# Patient Record
Sex: FEMALE | Race: WHITE | NOT HISPANIC OR LATINO | Employment: OTHER | URBAN - METROPOLITAN AREA
[De-identification: names, ages, dates, MRNs, and addresses within clinical notes are randomized per-mention and may not be internally consistent; named-entity substitution may affect disease eponyms.]

---

## 2019-07-08 ENCOUNTER — TELEPHONE (OUTPATIENT)
Dept: FAMILY MEDICINE CLINIC | Facility: CLINIC | Age: 39
End: 2019-07-08

## 2019-07-08 ENCOUNTER — OFFICE VISIT (OUTPATIENT)
Dept: RETAIL CLINIC | Facility: CLINIC | Age: 39
End: 2019-07-08

## 2019-07-08 VITALS
RESPIRATION RATE: 16 BRPM | OXYGEN SATURATION: 98 % | DIASTOLIC BLOOD PRESSURE: 68 MMHG | SYSTOLIC BLOOD PRESSURE: 102 MMHG | HEART RATE: 81 BPM | TEMPERATURE: 97.8 F

## 2019-07-08 DIAGNOSIS — A08.4 VIRAL GASTROENTERITIS: Primary | ICD-10-CM

## 2019-07-08 PROCEDURE — 99203 OFFICE O/P NEW LOW 30 MIN: CPT | Performed by: NURSE PRACTITIONER

## 2019-07-08 RX ORDER — ASPIRIN 325 MG
325 TABLET ORAL DAILY
COMMUNITY
End: 2019-08-12

## 2019-07-08 RX ORDER — PROMETHAZINE HYDROCHLORIDE 12.5 MG/1
12.5 TABLET ORAL EVERY 6 HOURS PRN
Qty: 8 TABLET | Refills: 0 | Status: SHIPPED | OUTPATIENT
Start: 2019-07-08 | End: 2019-07-10

## 2019-07-08 RX ORDER — POTASSIUM CHLORIDE 1.5 G/1.77G
20 POWDER, FOR SOLUTION ORAL 2 TIMES DAILY
COMMUNITY
End: 2019-08-12

## 2019-07-08 RX ORDER — AMLODIPINE BESYLATE 2.5 MG/1
2.5 TABLET ORAL DAILY
COMMUNITY
End: 2019-08-12

## 2019-07-08 NOTE — TELEPHONE ENCOUNTER
No open ángel. tmrw morning to go to the [facility] clinic or urgent care to be treated patient will call back if needed

## 2019-07-08 NOTE — PROGRESS NOTES
Subjective   Ania Reyes is a 38 y.o. female.     Vomiting    This is a new problem. The current episode started yesterday. The problem occurs intermittently. The problem has been unchanged. The emesis has an appearance of bile. There has been no fever. Associated symptoms include myalgias. Pertinent negatives include no abdominal pain, chills, diarrhea or fever. She has tried nothing for the symptoms. The treatment provided no relief.        The following portions of the patient's history were reviewed and updated as appropriate: allergies, current medications, past family history, past medical history, past social history, past surgical history and problem list.    Review of Systems   Constitutional: Positive for appetite change and fatigue. Negative for chills and fever.   HENT: Negative.    Respiratory: Negative.    Cardiovascular: Negative.    Gastrointestinal: Positive for nausea and vomiting. Negative for abdominal pain and diarrhea.   Musculoskeletal: Positive for myalgias.       Objective   Physical Exam   Constitutional: She is oriented to person, place, and time. Vital signs are normal. She appears well-developed.   HENT:   Head: Normocephalic.   Cardiovascular: Normal rate and regular rhythm.   Pulmonary/Chest: Effort normal and breath sounds normal.   Abdominal: Soft. Normal appearance. Bowel sounds are decreased. There is no tenderness.   Neurological: She is alert and oriented to person, place, and time.   Skin: Skin is warm and dry.   Psychiatric: She has a normal mood and affect.     Vitals:    07/08/19 1237   BP: 102/68   Pulse: 81   Resp: 16   Temp: 97.8 °F (36.6 °C)   TempSrc: Oral   SpO2: 98%         Assessment/Plan   Ania was seen today for vomiting.    Diagnoses and all orders for this visit:    Viral gastroenteritis  -     promethazine (PHENERGAN) 12.5 MG tablet; Take 1 tablet by mouth Every 6 (Six) Hours As Needed for Nausea or Vomiting for up to 2 days.      Follow up with PCP if  symptoms worsen or fail to improve.  Discussed dehydrations signs and symptoms.  Encouraged fluid intake.    Viral Gastroenteritis, Adult  Viral gastroenteritis is also known as the stomach flu. This condition is caused by certain germs (viruses). These germs can be passed from person to person very easily (are very contagious). This condition can cause sudden watery poop (diarrhea), fever, and throwing up (vomiting).  Having watery poop and throwing up can make you feel weak and cause you to get dehydrated. Dehydration can make you tired and thirsty, make you have a dry mouth, and make it so you pee (urinate) less often. Older adults and people with other diseases or a weak defense system (immune system) are at higher risk for dehydration. It is important to replace the fluids that you lose from having watery poop and throwing up.  Follow these instructions at home:  Follow instructions from your doctor about how to care for yourself at home.  Eating and drinking  Follow these instructions as told by your doctor:  · Take an oral rehydration solution (ORS). This is a drink that is sold at pharmacies and stores.  · Drink clear fluids in small amounts as you are able, such as:  ? Water.  ? Ice chips.  ? Diluted fruit juice.  ? Low-calorie sports drinks.  · Eat bland, easy-to-digest foods in small amounts as you are able, such as:  ? Bananas.  ? Applesauce.  ? Rice.  ? Low-fat (lean) meats.  ? Toast.  ? Crackers.  · Avoid fluids that have a lot of sugar or caffeine in them.  · Avoid alcohol.  · Avoid spicy or fatty foods.    General instructions  · Drink enough fluid to keep your pee (urine) clear or pale yellow.  · Wash your hands often. If you cannot use soap and water, use hand .  · Make sure that all people in your home wash their hands well and often.  · Rest at home while you get better.  · Take over-the-counter and prescription medicines only as told by your doctor.  · Watch your condition for any  changes.  · Take a warm bath to help with any burning or pain from having watery poop.  · Keep all follow-up visits as told by your doctor. This is important.  Contact a doctor if:  · You cannot keep fluids down.  · Your symptoms get worse.  · You have new symptoms.  · You feel light-headed or dizzy.  · You have muscle cramps.  Get help right away if:  · You have chest pain.  · You feel very weak or you pass out (faint).  · You see blood in your throw-up.  · Your throw-up looks like coffee grounds.  · You have bloody or black poop (stools) or poop that look like tar.  · You have a very bad headache, a stiff neck, or both.  · You have a rash.  · You have very bad pain, cramping, or bloating in your belly (abdomen).  · You have trouble breathing.  · You are breathing very quickly.  · Your heart is beating very quickly.  · Your skin feels cold and clammy.  · You feel confused.  · You have pain when you pee.  · You have signs of dehydration, such as:  ? Dark pee, hardly any pee, or no pee.  ? Cracked lips.  ? Dry mouth.  ? Sunken eyes.  ? Sleepiness.  ? Weakness.  This information is not intended to replace advice given to you by your health care provider. Make sure you discuss any questions you have with your health care provider.  Document Released: 06/05/2009 Document Revised: 02/16/2018 Document Reviewed: 08/23/2016  [employer] Interactive Patient Education © 2019 [employer]

## 2019-07-08 NOTE — PATIENT INSTRUCTIONS
Viral Gastroenteritis, Adult  Viral gastroenteritis is also known as the stomach flu. This condition is caused by certain germs (viruses). These germs can be passed from person to person very easily (are very contagious). This condition can cause sudden watery poop (diarrhea), fever, and throwing up (vomiting).  Having watery poop and throwing up can make you feel weak and cause you to get dehydrated. Dehydration can make you tired and thirsty, make you have a dry mouth, and make it so you pee (urinate) less often. Older adults and people with other diseases or a weak defense system (immune system) are at higher risk for dehydration. It is important to replace the fluids that you lose from having watery poop and throwing up.  Follow these instructions at home:  Follow instructions from your doctor about how to care for yourself at home.  Eating and drinking  Follow these instructions as told by your doctor:  · Take an oral rehydration solution (ORS). This is a drink that is sold at pharmacies and stores.  · Drink clear fluids in small amounts as you are able, such as:  ? Water.  ? Ice chips.  ? Diluted fruit juice.  ? Low-calorie sports drinks.  · Eat bland, easy-to-digest foods in small amounts as you are able, such as:  ? Bananas.  ? Applesauce.  ? Rice.  ? Low-fat (lean) meats.  ? Toast.  ? Crackers.  · Avoid fluids that have a lot of sugar or caffeine in them.  · Avoid alcohol.  · Avoid spicy or fatty foods.    General instructions  · Drink enough fluid to keep your pee (urine) clear or pale yellow.  · Wash your hands often. If you cannot use soap and water, use hand .  · Make sure that all people in your home wash their hands well and often.  · Rest at home while you get better.  · Take over-the-counter and prescription medicines only as told by your doctor.  · Watch your condition for any changes.  · Take a warm bath to help with any burning or pain from having watery poop.  · Keep all follow-up  visits as told by your doctor. This is important.  Contact a doctor if:  · You cannot keep fluids down.  · Your symptoms get worse.  · You have new symptoms.  · You feel light-headed or dizzy.  · You have muscle cramps.  Get help right away if:  · You have chest pain.  · You feel very weak or you pass out (faint).  · You see blood in your throw-up.  · Your throw-up looks like coffee grounds.  · You have bloody or black poop (stools) or poop that look like tar.  · You have a very bad headache, a stiff neck, or both.  · You have a rash.  · You have very bad pain, cramping, or bloating in your belly (abdomen).  · You have trouble breathing.  · You are breathing very quickly.  · Your heart is beating very quickly.  · Your skin feels cold and clammy.  · You feel confused.  · You have pain when you pee.  · You have signs of dehydration, such as:  ? Dark pee, hardly any pee, or no pee.  ? Cracked lips.  ? Dry mouth.  ? Sunken eyes.  ? Sleepiness.  ? Weakness.  This information is not intended to replace advice given to you by your health care provider. Make sure you discuss any questions you have with your health care provider.  Document Released: 06/05/2009 Document Revised: 02/16/2018 Document Reviewed: 08/23/2016  [employer] Interactive Patient Education © 2019 [employer]

## 2019-08-12 ENCOUNTER — OFFICE VISIT (OUTPATIENT)
Dept: FAMILY MEDICINE CLINIC | Facility: CLINIC | Age: 39
End: 2019-08-12

## 2019-08-12 VITALS
HEART RATE: 70 BPM | BODY MASS INDEX: 22.07 KG/M2 | WEIGHT: 149 LBS | DIASTOLIC BLOOD PRESSURE: 62 MMHG | OXYGEN SATURATION: 99 % | HEIGHT: 69 IN | TEMPERATURE: 98.3 F | SYSTOLIC BLOOD PRESSURE: 90 MMHG

## 2019-08-12 DIAGNOSIS — M79.641 PAIN IN RIGHT HAND: ICD-10-CM

## 2019-08-12 DIAGNOSIS — E78.5 DYSLIPIDEMIA: Primary | ICD-10-CM

## 2019-08-12 DIAGNOSIS — M62.89 MUSCLE HYPERTONICITY: ICD-10-CM

## 2019-08-12 DIAGNOSIS — G81.10 SPASTIC HEMIPLEGIA AFFECTING DOMINANT SIDE (HCC): ICD-10-CM

## 2019-08-12 DIAGNOSIS — R42 DIZZINESS: ICD-10-CM

## 2019-08-12 PROBLEM — Z29.89 NEED FOR SBE (SUBACUTE BACTERIAL ENDOCARDITIS) PROPHYLAXIS: Status: ACTIVE | Noted: 2019-08-12

## 2019-08-12 PROBLEM — G81.90 HEMIPLEGIA (HCC): Status: ACTIVE | Noted: 2018-09-28

## 2019-08-12 PROBLEM — Z29.8 NEED FOR SBE (SUBACUTE BACTERIAL ENDOCARDITIS) PROPHYLAXIS: Status: ACTIVE | Noted: 2019-08-12

## 2019-08-12 PROBLEM — N20.0 NEPHROLITHIASIS: Status: ACTIVE | Noted: 2019-08-12

## 2019-08-12 PROBLEM — N20.0 NEPHROLITHIASIS: Status: RESOLVED | Noted: 2019-08-12 | Resolved: 2019-08-12

## 2019-08-12 PROBLEM — M85.80 OSTEOPENIA: Status: ACTIVE | Noted: 2019-08-12

## 2019-08-12 PROCEDURE — 99214 OFFICE O/P EST MOD 30 MIN: CPT | Performed by: NURSE PRACTITIONER

## 2019-08-12 RX ORDER — METOPROLOL SUCCINATE 25 MG/1
25 TABLET, EXTENDED RELEASE ORAL DAILY
Refills: 3 | COMMUNITY
Start: 2019-07-01

## 2019-08-12 RX ORDER — POTASSIUM CHLORIDE 750 MG/1
CAPSULE, EXTENDED RELEASE ORAL
COMMUNITY
End: 2019-08-12

## 2019-08-12 RX ORDER — DICLOFENAC SODIUM 75 MG/1
75 TABLET, DELAYED RELEASE ORAL 2 TIMES DAILY
COMMUNITY
End: 2019-08-12

## 2019-08-12 RX ORDER — BACLOFEN 10 MG/1
10 TABLET ORAL DAILY
COMMUNITY
End: 2019-08-12 | Stop reason: SDUPTHER

## 2019-08-12 RX ORDER — GABAPENTIN 600 MG/1
600 TABLET ORAL 4 TIMES DAILY
Refills: 5 | COMMUNITY
Start: 2019-08-02

## 2019-08-12 RX ORDER — ATORVASTATIN CALCIUM 20 MG/1
20 TABLET, FILM COATED ORAL DAILY
COMMUNITY

## 2019-08-12 RX ORDER — BACLOFEN 10 MG/1
10 TABLET ORAL DAILY
Qty: 90 TABLET | Refills: 1 | Status: SHIPPED | OUTPATIENT
Start: 2019-08-12 | End: 2020-02-18 | Stop reason: SDUPTHER

## 2019-08-12 RX ORDER — POTASSIUM CHLORIDE 750 MG/1
10 TABLET, FILM COATED, EXTENDED RELEASE ORAL DAILY
COMMUNITY

## 2019-08-12 NOTE — PROGRESS NOTES
Subjective   Ania Reyes is a 39 y.o. female.     Chief Complaint   Patient presents with   • Dizziness     when walking    • Follow-up     right hand pain   • Hyperlipidemia       History of Present Illness   Patient presents with c/o dizziness when bending over and then raising up; some orthostasis last week when pulling weeds and had been bent over; no longer taking Bumex daily; Dr. Mcucrdy, cardiology decreased dose in March and helped dizziness; stopped Bumex about 3 months ago; drinks plenty of liquids; no runny/stuffy nose; no ear pain; no sore throat; had stomach virus about 10 days ago.    F/U MVR: takes Metoprolol daily; does not monitor BP; no swelling; no headaches.    F/U spastic hemiplegia: takes Diclofenac twice daily and Baclofen daily as needed; sees pain management; also takes Gabapentin TID; has discomfort in right hand near thumb; no current PT; has not been doing home exercises.    F/U Hyperlipidemia: takes Atorvastatin daily; no myalgias; does not really watch diet; not much exercise; not fasting today.    The following portions of the patient's history were reviewed and updated as appropriate: allergies, current medications, past family history, past medical history, past social history, past surgical history and problem list.    Past Medical History:   Diagnosis Date   • Allergic rhinitis    • Aphasia    • BMI 22.0-22.9, adult    • Depression with anxiety    • Dyslipidemia    • Fatigue    • Functional murmur    • Gum hypertrophy    • High risk medication use    • History of acute sinusitis    • History of dizziness    • History of echocardiogram    • History of hyperparathyroidism    • History of kidney stones     Bilateral   • History of low back pain    • History of mitral valve repair    • History of nephrolithiasis    • History of stroke    • History of transesophageal echocardiography (PRASHANT)    • Lactase deficiency    • Mitral regurgitation    • Mitral valve prolapse    • Muscle  hypertonicity    • Osteopenia    • Pain in right hand    • Pain, wrist joint    • Parathyroid adenoma    • Positive depression screening    • Primary hyperparathyroidism (CMS/HCC)    • Right arm pain    • Sinus tachycardia    • Spastic hemiplegia affecting dominant side (CMS/HCC)    • Vitamin D deficiency        Past Surgical History:   Procedure Laterality Date   • KNEE SURGERY Left    • MITRAL VALVE REPAIR/REPLACEMENT     • OTHER SURGICAL HISTORY      Selective Arterial Catheterization    • PARATHYROID GLAND SURGERY  2014    Parathyroid resection       Family History   Problem Relation Age of Onset   • Coronary artery disease Mother    • Glaucoma Mother    • Nephrolithiasis Mother    • No Known Problems Father    • Colon cancer Maternal Grandmother         diagnosed in her 60's       Social History     Socioeconomic History   • Marital status:      Spouse name: Not on file   • Number of children: Not on file   • Years of education: Not on file   • Highest education level: Not on file   Tobacco Use   • Smoking status: Never Smoker   • Smokeless tobacco: Never Used   Substance and Sexual Activity   • Alcohol use: Yes     Comment: social drinker   • Drug use: No   • Sexual activity: Yes     Partners: Male     Birth control/protection: None       Review of Systems   Constitutional: Negative for appetite change, chills, fatigue, fever, unexpected weight gain and unexpected weight loss.   HENT: Negative for ear pain, rhinorrhea, sore throat and trouble swallowing.    Eyes: Negative for blurred vision and pain.   Respiratory: Negative for cough, chest tightness and shortness of breath.    Cardiovascular: Negative for chest pain, palpitations and leg swelling.   Gastrointestinal: Negative for abdominal pain, constipation, diarrhea, GERD and indigestion.   Genitourinary: Negative for dysuria and frequency.   Musculoskeletal: Negative for arthralgias and back pain.   Skin: Negative for rash.   Neurological: Positive  "for dizziness. Negative for syncope and headache.   Psychiatric/Behavioral: Negative for sleep disturbance and depressed mood.       Objective   Vitals:    08/12/19 1314 08/12/19 1405 08/12/19 1406   BP: 99/60 90/70 90/62   BP Location: Right arm Left arm Left arm   Patient Position: Sitting Sitting Standing   Cuff Size: Adult     Pulse: 70     Temp: 98.3 °F (36.8 °C)     SpO2: 99%     Weight: 67.6 kg (149 lb)     Height: 175.3 cm (69\")       Body mass index is 22 kg/m².    Physical Exam   Constitutional: She is oriented to person, place, and time. She appears well-developed and well-nourished. No distress.   HENT:   Head: Normocephalic.   Right Ear: Tympanic membrane is not erythematous. No middle ear effusion.   Left Ear: Tympanic membrane is not erythematous. Left ear middle ear effusion: mild fluid behind left TM.   Nose: Septal deviation (deviated to right) present. Abnormal turbinates: pale. Right sinus exhibits no maxillary sinus tenderness and no frontal sinus tenderness. Left sinus exhibits no maxillary sinus tenderness and no frontal sinus tenderness.   Mouth/Throat: Oropharynx is clear and moist and mucous membranes are normal. No oropharyngeal exudate or posterior oropharyngeal erythema.   Eyes: Conjunctivae are normal.   Neck: Normal range of motion. Neck supple. Carotid bruit is not present.   Cardiovascular: Normal rate, regular rhythm, normal heart sounds and intact distal pulses.   No murmur heard.  Pulmonary/Chest: Effort normal and breath sounds normal. No respiratory distress.   Abdominal: Soft. Bowel sounds are normal. There is no hepatosplenomegaly. There is no tenderness.   Musculoskeletal: She exhibits no edema.        Right wrist: Decreased range of motion: slight decreased ROM of right hand compared to left; strength slightly weaker on right compared to left.   Neurological: She is alert and oriented to person, place, and time.   Skin: Skin is warm and dry.   Psychiatric: She has a normal " mood and affect. Judgment normal.   Vitals reviewed.        Assessment/Plan .  Problem List Items Addressed This Visit     Spastic hemiplegia affecting dominant side (CMS/HCC)    Current Assessment & Plan     Resume home therapy exercises for right hand.         Relevant Medications    baclofen (LIORESAL) 10 MG tablet    diclofenac (VOLTAREN) 50 MG EC tablet    Pain in right hand    Current Assessment & Plan     Will try lower dose of Diclofenac twice daily.         Relevant Medications    baclofen (LIORESAL) 10 MG tablet    diclofenac (VOLTAREN) 50 MG EC tablet    Muscle hypertonicity    Relevant Medications    baclofen (LIORESAL) 10 MG tablet    diclofenac (VOLTAREN) 50 MG EC tablet    Dyslipidemia - Primary    Current Assessment & Plan     Continue to work on healthy diet and exercise.         Relevant Orders    Comprehensive Metabolic Panel    Lipid Panel With LDL / HDL Ratio      Other Visit Diagnoses     Dizziness        Change positions slowly.    Relevant Orders    CBC & Differential          Return in about 6 months (around 2/12/2020), or if symptoms worsen or fail to improve, for Medicare Wellness.

## 2019-08-12 NOTE — PATIENT INSTRUCTIONS
Continue increased intake of clear liquids.  Change positions slowly.  Try over the counter antihistamine, such as Claritin or Allegra.  Monitor your blood pressure periodically and record results.  Follow up pending lab results.  Check with cardiology regarding BP readings.

## 2019-08-13 LAB
ALBUMIN SERPL-MCNC: 4.2 G/DL (ref 3.5–5.5)
ALBUMIN/GLOB SERPL: 1.8 {RATIO} (ref 1.2–2.2)
ALP SERPL-CCNC: 80 IU/L (ref 39–117)
ALT SERPL-CCNC: 14 IU/L (ref 0–32)
AST SERPL-CCNC: 13 IU/L (ref 0–40)
BASOPHILS # BLD AUTO: 0.1 X10E3/UL (ref 0–0.2)
BASOPHILS NFR BLD AUTO: 1 %
BILIRUB SERPL-MCNC: 0.3 MG/DL (ref 0–1.2)
BUN SERPL-MCNC: 16 MG/DL (ref 6–20)
BUN/CREAT SERPL: 25 (ref 9–23)
CALCIUM SERPL-MCNC: 9.4 MG/DL (ref 8.7–10.2)
CHLORIDE SERPL-SCNC: 105 MMOL/L (ref 96–106)
CHOLEST SERPL-MCNC: 140 MG/DL (ref 100–199)
CO2 SERPL-SCNC: 25 MMOL/L (ref 20–29)
CREAT SERPL-MCNC: 0.65 MG/DL (ref 0.57–1)
EOSINOPHIL # BLD AUTO: 0.1 X10E3/UL (ref 0–0.4)
EOSINOPHIL NFR BLD AUTO: 1 %
ERYTHROCYTE [DISTWIDTH] IN BLOOD BY AUTOMATED COUNT: 13.3 % (ref 12.3–15.4)
GLOBULIN SER CALC-MCNC: 2.4 G/DL (ref 1.5–4.5)
GLUCOSE SERPL-MCNC: 77 MG/DL (ref 65–99)
HCT VFR BLD AUTO: 36.1 % (ref 34–46.6)
HDLC SERPL-MCNC: 43 MG/DL
HGB BLD-MCNC: 11.8 G/DL (ref 11.1–15.9)
IMM GRANULOCYTES # BLD AUTO: 0 X10E3/UL (ref 0–0.1)
IMM GRANULOCYTES NFR BLD AUTO: 0 %
LDLC SERPL CALC-MCNC: 64 MG/DL (ref 0–99)
LDLC/HDLC SERPL: 1.5 RATIO (ref 0–3.2)
LYMPHOCYTES # BLD AUTO: 1.9 X10E3/UL (ref 0.7–3.1)
LYMPHOCYTES NFR BLD AUTO: 29 %
MCH RBC QN AUTO: 29.3 PG (ref 26.6–33)
MCHC RBC AUTO-ENTMCNC: 32.7 G/DL (ref 31.5–35.7)
MCV RBC AUTO: 90 FL (ref 79–97)
MONOCYTES # BLD AUTO: 0.4 X10E3/UL (ref 0.1–0.9)
MONOCYTES NFR BLD AUTO: 7 %
NEUTROPHILS # BLD AUTO: 3.9 X10E3/UL (ref 1.4–7)
NEUTROPHILS NFR BLD AUTO: 62 %
PLATELET # BLD AUTO: 244 X10E3/UL (ref 150–450)
POTASSIUM SERPL-SCNC: 4.1 MMOL/L (ref 3.5–5.2)
PROT SERPL-MCNC: 6.6 G/DL (ref 6–8.5)
RBC # BLD AUTO: 4.03 X10E6/UL (ref 3.77–5.28)
SODIUM SERPL-SCNC: 144 MMOL/L (ref 134–144)
TRIGL SERPL-MCNC: 167 MG/DL (ref 0–149)
VLDLC SERPL CALC-MCNC: 33 MG/DL (ref 5–40)
WBC # BLD AUTO: 6.4 X10E3/UL (ref 3.4–10.8)

## 2019-10-07 PROBLEM — IMO0002 LACTASE DEFICIENCY: Status: RESOLVED | Noted: 2019-10-07 | Resolved: 2019-10-07

## 2019-10-07 PROBLEM — R47.01 APHASIA: Status: ACTIVE | Noted: 2019-10-07

## 2019-10-07 PROBLEM — R47.01 APHASIA: Status: RESOLVED | Noted: 2019-10-07 | Resolved: 2019-10-07

## 2019-10-07 PROBLEM — E55.9 VITAMIN D DEFICIENCY: Status: ACTIVE | Noted: 2019-10-07

## 2019-10-07 PROBLEM — IMO0002 LACTASE DEFICIENCY: Status: ACTIVE | Noted: 2019-10-07

## 2020-01-28 ENCOUNTER — OFFICE VISIT (OUTPATIENT)
Dept: FAMILY MEDICINE CLINIC | Facility: CLINIC | Age: 40
End: 2020-01-28

## 2020-01-28 VITALS
TEMPERATURE: 98.2 F | OXYGEN SATURATION: 98 % | HEIGHT: 69 IN | WEIGHT: 148.8 LBS | BODY MASS INDEX: 22.04 KG/M2 | HEART RATE: 66 BPM | SYSTOLIC BLOOD PRESSURE: 110 MMHG | DIASTOLIC BLOOD PRESSURE: 76 MMHG

## 2020-01-28 DIAGNOSIS — M79.641 PAIN IN RIGHT HAND: ICD-10-CM

## 2020-01-28 DIAGNOSIS — G81.10 SPASTIC HEMIPLEGIA AFFECTING DOMINANT SIDE (HCC): ICD-10-CM

## 2020-01-28 DIAGNOSIS — Z00.00 ENCOUNTER FOR MEDICARE ANNUAL WELLNESS EXAM: Primary | ICD-10-CM

## 2020-01-28 DIAGNOSIS — M85.80 OSTEOPENIA, UNSPECIFIED LOCATION: ICD-10-CM

## 2020-01-28 DIAGNOSIS — E78.5 DYSLIPIDEMIA: ICD-10-CM

## 2020-01-28 DIAGNOSIS — I69.351 HEMIPLEGIA OF RIGHT DOMINANT SIDE AS LATE EFFECT OF CEREBRAL INFARCTION, UNSPECIFIED HEMIPLEGIA TYPE (HCC): ICD-10-CM

## 2020-01-28 DIAGNOSIS — M62.89 MUSCLE HYPERTONICITY: ICD-10-CM

## 2020-01-28 DIAGNOSIS — J30.9 ALLERGIC RHINITIS, UNSPECIFIED SEASONALITY, UNSPECIFIED TRIGGER: ICD-10-CM

## 2020-01-28 DIAGNOSIS — E55.9 VITAMIN D DEFICIENCY: ICD-10-CM

## 2020-01-28 PROCEDURE — G0439 PPPS, SUBSEQ VISIT: HCPCS | Performed by: NURSE PRACTITIONER

## 2020-01-28 PROCEDURE — 99213 OFFICE O/P EST LOW 20 MIN: CPT | Performed by: NURSE PRACTITIONER

## 2020-01-28 NOTE — PATIENT INSTRUCTIONS
Monitor your blood pressure periodically and record results.  Continue to work on healthy diet and exercise.  Check with cardiology regarding Metoprolol dose.  Try to decrease Diclofenac to once daily.  Try over the counter antihistamine, such as Claritin or Allegra, daily.  Follow up pending lab results.  Follow up in 6 months, or sooner if symptoms persist or worsen.      Medicare Wellness  Personal Prevention Plan of Service     Date of Office Visit:  2020  Encounter Provider:  YONG Garcia  Place of Service:  [facility]  Patient Name: Ania Reyes  :  1980    As part of the Medicare Wellness portion of your visit today, we are providing you with this personalized preventive plan of services (PPPS). This plan is based upon recommendations of the [place] Preventive Services Task Force (USPSTF) and the Advisory Committee on Immunization Practices (ACIP).    This lists the preventive care services that should be considered, and provides dates of when you are due. Items listed as completed are up-to-date and do not require any further intervention.    Health Maintenance   Topic Date Due   • TDAP/TD VACCINES (1 - Tdap) 1993   • PAP SMEAR  2019   • MEDICARE ANNUAL WELLNESS  2020   • DXA SCAN  2021   • INFLUENZA VACCINE  Completed       Orders Placed This Encounter   Procedures   • Comprehensive Metabolic Panel   • Lipid Panel With LDL / HDL Ratio   • Vitamin D 25 Hydroxy       Return in about 6 months (around 2020) for Recheck.

## 2020-01-28 NOTE — PROGRESS NOTES
The ABCs of the Annual Wellness Visit  Subsequent Medicare Wellness Visit    Chief Complaint   Patient presents with   • Annual Exam     [facility]       Subjective   History of Present Illness:  Ania Reyes is a 39 y.o. female who presents for a Subsequent Medicare Wellness Visit.    HEALTH RISK ASSESSMENT    Recent Hospitalizations:  No hospitalization(s) within the last year.    Current Medical Providers:  Patient Care Team:  Cinthia Mark APRN as PCP - General (Family Medicine)  Betty Mccurdy MD as Consulting Physician (Cardiology)  Amanda Escobar (Nurse Practitioner)    Smoking Status:  Social History     Tobacco Use   Smoking Status Never Smoker   Smokeless Tobacco Never Used       Alcohol Consumption:  Social History     Substance and Sexual Activity   Alcohol Use Yes    Comment: social drinker       Depression Screen:   PHQ-2/PHQ-9 Depression Screening 1/28/2020   Little interest or pleasure in doing things 0   Feeling down, depressed, or hopeless 0   Total Score 0       Fall Risk Screen:  LAURYN Fall Risk Assessment was completed, and patient is at LOW risk for falls.Assessment completed on:1/28/2020    Health Habits and Functional and Cognitive Screening:  Functional & Cognitive Status 1/28/2020   Do you have difficulty preparing food and eating? No   Do you have difficulty bathing yourself, getting dressed or grooming yourself? No   Do you have difficulty using the toilet? No   Do you have difficulty moving around from place to place? No   Do you have trouble with steps or getting out of a bed or a chair? No   Current Diet Well Balanced Diet   Dental Exam Up to date   Eye Exam Up to date   Exercise (times per week) 0 times per week   Current Exercise Activities Include None   Do you need help using the phone?  No   Are you deaf or do you have serious difficulty hearing?  No   Do you need help with transportation? No   Do you need help shopping? No   Do you need help preparing meals?  No   Do you need  help with housework?  No   Do you need help with laundry? No   Do you need help taking your medications? No   Do you need help managing money? No   Do you ever drive or ride in a car without wearing a seat belt? No   Have you felt unusual stress, anger or loneliness in the last month? No   Who do you live with? Spouse   If you need help, do you have trouble finding someone available to you? No   Have you been bothered in the last four weeks by sexual problems? No   Do you have difficulty concentrating, remembering or making decisions? No         Does the patient have evidence of cognitive impairment? No    Asprin use counseling:Taking ASA appropriately as indicated    Age-appropriate Screening Schedule:  Refer to the list below for future screening recommendations based on patient's age, sex and/or medical conditions. Orders for these recommended tests are listed in the plan section. The patient has been provided with a written plan.    Health Maintenance   Topic Date Due   • TDAP/TD VACCINES (1 - Tdap) 08/01/1993   • PAP SMEAR  07/28/2020 (Originally 7/8/2019)   • DXA SCAN  02/21/2021   • INFLUENZA VACCINE  Completed          The following portions of the patient's history were reviewed and updated as appropriate: allergies, current medications, past family history, past medical history, past social history, past surgical history and problem list.    Outpatient Medications Prior to Visit   Medication Sig Dispense Refill   • aspirin 81 MG tablet Take 81 mg by mouth Daily.     • atorvastatin (LIPITOR) 20 MG tablet Take 20 mg by mouth Daily.     • baclofen (LIORESAL) 10 MG tablet Take 1 tablet by mouth Daily. 90 tablet 1   • diclofenac (VOLTAREN) 50 MG EC tablet Take 1 tablet by mouth 2 (Two) Times a Day As Needed (right hand pain). 180 tablet 1   • gabapentin (NEURONTIN) 600 MG tablet Take 600 mg by mouth 4 (Four) Times a Day.  5   • metoprolol succinate XL (TOPROL-XL) 25 MG 24 hr tablet Take 25 mg by mouth 2 (Two)  Times a Day.  3   • potassium chloride (K-DUR) 10 MEQ CR tablet Take 10 mEq by mouth 2 (Two) Times a Day.     • VITAMIN D, CHOLECALCIFEROL, PO Take 1 tablet by mouth Daily.     • Multiple Vitamin (MULTI-DAY PO) One A Day Vitamin     • Multiple Vitamins-Minerals (MULTIVITAMIN ADULTS PO) Take 1 tablet by mouth Daily.       No facility-administered medications prior to visit.        Patient Active Problem List   Diagnosis   • Spastic hemiplegia affecting dominant side (CMS/HCC)   • Pain in right hand   • Muscle hypertonicity   • Mitral regurgitation   • History of stroke   • History of mitral valve repair   • High risk medication use   • Functional murmur   • Dyslipidemia   • Allergic rhinitis   • Hemiplegia (CMS/HCC)   • Need for SBE (subacute bacterial endocarditis) prophylaxis   • Osteopenia   • Vitamin D deficiency       Advanced Care Planning:  Patient does not have an advance directive - information provided to the patient today     Ania Reyes is a 39 y.o. female who presents for an Medicare Wellness Visit. In addition, we addressed the following health issues:    F/U S/P MVR: takes Metoprolol twice daily; no longer taking Bumex daily; has still been taking KCL twice daily; does not monitor BP; has had some mild dizziness at times, not related to position changes; no swelling; no headaches; no ear pain; no runny stuffy nose; has follow up with Dr. Mccurdy cardiology in March 2020.    F/U Hyperlipidemia: takes Atorvastatin daily; no myalgias; does not really watch diet; not much exercise; nonfasting today, had piece of toast.    F/U right hand pain: pain related to hemiplegic stroke in past periop MVR; takes Diclofenac twice daily and Baclofen daily and work well; also takes Gabapentin QID per pain management; no current physical therapy; has not seen neurology for long time.    Review of Systems   Constitutional: Negative for appetite change, chills, fatigue, fever and unexpected weight change.   HENT:  "Positive for rhinorrhea. Negative for ear pain, sinus pressure and sore throat.    Eyes: Negative for pain and visual disturbance.   Respiratory: Negative for cough, chest tightness and shortness of breath.    Cardiovascular: Negative for chest pain and palpitations.   Gastrointestinal: Negative for abdominal pain, blood in stool, constipation and diarrhea.   Endocrine: Negative for cold intolerance, heat intolerance, polydipsia and polyuria.   Genitourinary: Negative for dysuria and frequency.   Musculoskeletal: Negative for back pain.   Skin: Negative for rash.   Neurological: Negative for syncope and weakness.   Hematological: Does not bruise/bleed easily.   Psychiatric/Behavioral: Negative for dysphoric mood and sleep disturbance. The patient is not nervous/anxious.        Compared to one year ago, the patient feels her physical health is the same.  Compared to one year ago, the patient feels her mental health is the same.    Reviewed chart for potential of high risk medication in the elderly: yes  Reviewed chart for potential of harmful drug interactions in the elderly:yes    Objective         Vitals:    01/28/20 0821 01/28/20 0911 01/28/20 0912   BP: 104/70 92/70 110/76   BP Location: Left arm Left arm Left arm   Patient Position: Sitting Sitting Standing   Cuff Size: Adult     Pulse: 66     Temp: 98.2 °F (36.8 °C)     SpO2: 98%     Weight: 67.5 kg (148 lb 12.8 oz)     Height: 175.3 cm (69\")         Body mass index is 21.97 kg/m².  Discussed the patient's BMI with her. The BMI is in the acceptable range.    Physical Exam   Constitutional: She is oriented to person, place, and time. Vital signs are normal. She appears well-developed and well-nourished. No distress.   HENT:   Head: Normocephalic and atraumatic.   Right Ear: External ear normal. Tympanic membrane is not erythematous. No middle ear effusion.   Left Ear: External ear normal. Tympanic membrane is not erythematous. Left ear middle ear effusion: mild " fluid behind TM.   Nose: Nose normal. Right sinus exhibits no maxillary sinus tenderness and no frontal sinus tenderness. Left sinus exhibits no maxillary sinus tenderness and no frontal sinus tenderness.   Mouth/Throat: Oropharynx is clear and moist and mucous membranes are normal. No oropharyngeal exudate or posterior oropharyngeal erythema.   Eyes: Pupils are equal, round, and reactive to light. Conjunctivae and EOM are normal. Right eye exhibits no discharge. Left eye exhibits no discharge. No scleral icterus.   Neck: Normal range of motion. Neck supple. Carotid bruit is not present. No tracheal deviation present. No thyroid mass and no thyromegaly present.   Cardiovascular: Normal rate, regular rhythm, normal heart sounds and intact distal pulses.   No murmur heard.  Pulmonary/Chest: Effort normal and breath sounds normal. No respiratory distress. She has no wheezes. She has no rales. She exhibits no tenderness.   Abdominal: Soft. Bowel sounds are normal. She exhibits no distension and no mass. There is no hepatosplenomegaly. There is no tenderness. There is no rebound and no guarding.   Musculoskeletal: Normal range of motion. She exhibits no edema or tenderness.        Cervical back: She exhibits normal range of motion and no bony tenderness.        Thoracic back: She exhibits normal range of motion and no bony tenderness.        Lumbar back: She exhibits normal range of motion and no bony tenderness.   Lymphadenopathy:     She has no cervical adenopathy.   Neurological: She is alert and oriented to person, place, and time. She has normal reflexes. No sensory deficit. Coordination and gait normal.   Slight weakness on right compared to left; decreased ROM of right hand due to spasticity;    Skin: Skin is warm and dry. No rash noted.   Psychiatric: She has a normal mood and affect. Her behavior is normal. Judgment and thought content normal. Cognition and memory are normal.   Nursing note and vitals  reviewed.    Lab Results   Component Value Date    WBC 6.4 08/12/2019    RBC 4.03 08/12/2019    HGB 11.8 08/12/2019    HCT 36.1 08/12/2019    MCV 90 08/12/2019    MCH 29.3 08/12/2019    MCHC 32.7 08/12/2019    RDW 13.3 08/12/2019     08/12/2019    NEUTRORELPCT 62 08/12/2019    LYMPHORELPCT 29 08/12/2019    MONORELPCT 7 08/12/2019    EOSRELPCT 1 08/12/2019    BASORELPCT 1 08/12/2019    NEUTROABS 3.9 08/12/2019    LYMPHSABS 1.9 08/12/2019    MONOSABS 0.4 08/12/2019    EOSABS 0.1 08/12/2019    BASOSABS 0.1 08/12/2019        Lab Results   Component Value Date    CHLPL 140 08/12/2019    TRIG 167 (H) 08/12/2019    HDL 43 08/12/2019    VLDL 33 08/12/2019    LDL 64 08/12/2019           Assessment/Plan   Medicare Risks and Personalized Health Plan  CMS Preventative Services Quick Reference  Immunizations Discussed/Encouraged (specific immunizations; adacel Tdap )  Polypharmacy     Discussed low risk for falls and recommended avoiding throw rugs, installing grab bars in bathroom, making sure have handrails on steps, etc.    The above risks/problems have been discussed with the patient.  Pertinent information has been shared with the patient in the After Visit Summary.  Follow up plans and orders are seen below in the Assessment/Plan Section.    Diagnoses and all orders for this visit:    1. Encounter for Medicare annual wellness exam (Primary)  -     Comprehensive Metabolic Panel  -     Lipid Panel With LDL / HDL Ratio  -     Vitamin D 25 Hydroxy    2. Spastic hemiplegia affecting dominant side (CMS/[facility])  Assessment & Plan:  Continue exercises per physical therapy.      3. Dyslipidemia  Assessment & Plan:  Continue to work on healthy diet and exercise.    Orders:  -     Comprehensive Metabolic Panel  -     Lipid Panel With LDL / HDL Ratio    4. Muscle hypertonicity  Assessment & Plan:  Continue Baclofen daily.      5. Pain in right hand  Assessment & Plan:  Try to decrease Diclofenac to once daily.      6. Hemiplegia  of right dominant side as late effect of cerebral infarction, unspecified hemiplegia type (CMS/HCC)    7. Vitamin D deficiency  -     Vitamin D 25 Hydroxy    8. Osteopenia, unspecified location  -     Vitamin D 25 Hydroxy    9. Allergic rhinitis, unspecified seasonality, unspecified trigger  Assessment & Plan:  Try over the counter antihistamine, such as Claritin or Allegra, daily.      Follow Up:  Return in about 6 months (around 7/28/2020) for Recheck. or sooner if problems or concerns.  Declines PAP today due to current menses; will consider appt with GYN or schedule PAP.    An After Visit Summary and PPPS were given to the patient.

## 2020-01-29 LAB
25(OH)D3+25(OH)D2 SERPL-MCNC: 47.4 NG/ML (ref 30–100)
ALBUMIN SERPL-MCNC: 4.7 G/DL (ref 3.5–5.2)
ALBUMIN/GLOB SERPL: 2.2 G/DL
ALP SERPL-CCNC: 74 U/L (ref 39–117)
ALT SERPL-CCNC: 18 U/L (ref 1–33)
AST SERPL-CCNC: 19 U/L (ref 1–32)
BILIRUB SERPL-MCNC: 0.3 MG/DL (ref 0.2–1.2)
BUN SERPL-MCNC: 16 MG/DL (ref 6–20)
BUN/CREAT SERPL: 18.8 (ref 7–25)
CALCIUM SERPL-MCNC: 9.5 MG/DL (ref 8.6–10.5)
CHLORIDE SERPL-SCNC: 106 MMOL/L (ref 98–107)
CHOLEST SERPL-MCNC: 156 MG/DL (ref 0–200)
CO2 SERPL-SCNC: 22.3 MMOL/L (ref 22–29)
CREAT SERPL-MCNC: 0.85 MG/DL (ref 0.57–1)
GLOBULIN SER CALC-MCNC: 2.1 GM/DL
GLUCOSE SERPL-MCNC: 81 MG/DL (ref 65–99)
HDLC SERPL-MCNC: 42 MG/DL (ref 40–60)
LDLC SERPL CALC-MCNC: 81 MG/DL (ref 0–100)
LDLC/HDLC SERPL: 1.93 {RATIO}
POTASSIUM SERPL-SCNC: 4.2 MMOL/L (ref 3.5–5.2)
PROT SERPL-MCNC: 6.8 G/DL (ref 6–8.5)
SODIUM SERPL-SCNC: 142 MMOL/L (ref 136–145)
TRIGL SERPL-MCNC: 165 MG/DL (ref 0–150)
VLDLC SERPL CALC-MCNC: 33 MG/DL

## 2020-02-14 ENCOUNTER — TELEPHONE (OUTPATIENT)
Dept: FAMILY MEDICINE CLINIC | Facility: CLINIC | Age: 40
End: 2020-02-14

## 2020-02-14 DIAGNOSIS — G81.10 SPASTIC HEMIPLEGIA AFFECTING DOMINANT SIDE (HCC): ICD-10-CM

## 2020-02-14 DIAGNOSIS — M62.89 MUSCLE HYPERTONICITY: ICD-10-CM

## 2020-02-14 DIAGNOSIS — M79.641 PAIN IN RIGHT HAND: ICD-10-CM

## 2020-02-14 NOTE — TELEPHONE ENCOUNTER
Pt is requesting a refill on the following medication,   baclofen (LIORESAL) 10 MG tablet        Sig: Take 1 tablet by mouth Daily.

## 2020-02-18 DIAGNOSIS — G81.10 SPASTIC HEMIPLEGIA AFFECTING DOMINANT SIDE (HCC): ICD-10-CM

## 2020-02-18 DIAGNOSIS — M79.641 PAIN IN RIGHT HAND: ICD-10-CM

## 2020-02-18 DIAGNOSIS — M62.89 MUSCLE HYPERTONICITY: ICD-10-CM

## 2020-02-18 RX ORDER — BACLOFEN 10 MG/1
10 TABLET ORAL DAILY
Qty: 90 TABLET | Refills: 1 | Status: SHIPPED | OUTPATIENT
Start: 2020-02-18 | End: 2020-08-07 | Stop reason: SDUPTHER

## 2020-02-19 RX ORDER — BACLOFEN 10 MG/1
TABLET ORAL
Qty: 90 TABLET | Refills: 0 | OUTPATIENT
Start: 2020-02-19

## 2020-03-06 ENCOUNTER — TELEPHONE (OUTPATIENT)
Dept: FAMILY MEDICINE CLINIC | Facility: CLINIC | Age: 40
End: 2020-03-06

## 2020-03-06 NOTE — TELEPHONE ENCOUNTER
Patient needs renewal paper work completed for parking permit for disabled persons; pt has history of stroke and weakness of right side; pt has pain in right foot and leg with walking; pt tires easily with prolonged walking due to discomfort; will complete paper work for parking permit.

## 2020-05-27 ENCOUNTER — TELEPHONE (OUTPATIENT)
Dept: FAMILY MEDICINE CLINIC | Facility: CLINIC | Age: 40
End: 2020-05-27

## 2020-05-27 NOTE — TELEPHONE ENCOUNTER
Records reviewed; box marked on previous paper work noting pt able to endorse checks and direct the use of proceeds; patient notified and will contact social security to see what may be the problem.

## 2020-05-27 NOTE — TELEPHONE ENCOUNTER
PT CALLED IN STATING THAT SHE WAS INFORMED  BY KEIRA CORREIA THAT SHE NEEDED A REPRESENTATIVE FOR HER SOCIAL SECURITY DISABILITY PAYMENT.  PT NEEDS A WRITTEN STATEMENT FROM   THAT SHE DOES NOT NEED A REPRESENTATIVE.   PT SAID IT WAS OK TO LIST MUKUND AND GIVE HER PHONE NUMBER TO TALK TO THE STAFF THAT CALLS BACK.  I INFORMED PT TO HAVE MUKUND ADDED TO HER BH VERBAL THE NEXT TIME SHE IS IN OFFICE.    PT CALL BACK  [phone]  MUKUND  [phone]

## 2020-05-27 NOTE — TELEPHONE ENCOUNTER
Patient states she had a statement in the past that did not allow her to manage her social security checks; that a representative was needed; her spouse recently passed; pt has been managing her household and paying all of her bills herself; needs statement from provider who originally documented she needed a representative to manage the social security disability payment stating that this is no longer necessary; please contact [employer] regarding any disability/social security paper work in her chart and have sent; will complete statement pending review.

## 2020-07-28 ENCOUNTER — OFFICE VISIT (OUTPATIENT)
Dept: FAMILY MEDICINE CLINIC | Facility: CLINIC | Age: 40
End: 2020-07-28

## 2020-07-28 VITALS
HEIGHT: 69 IN | TEMPERATURE: 98.2 F | DIASTOLIC BLOOD PRESSURE: 62 MMHG | WEIGHT: 154.2 LBS | SYSTOLIC BLOOD PRESSURE: 120 MMHG | HEART RATE: 86 BPM | OXYGEN SATURATION: 99 % | BODY MASS INDEX: 22.84 KG/M2

## 2020-07-28 DIAGNOSIS — E78.49 OTHER HYPERLIPIDEMIA: ICD-10-CM

## 2020-07-28 DIAGNOSIS — Z01.419 WELL WOMAN EXAM WITH ROUTINE GYNECOLOGICAL EXAM: Primary | ICD-10-CM

## 2020-07-28 DIAGNOSIS — G81.10 SPASTIC HEMIPLEGIA AFFECTING DOMINANT SIDE (HCC): ICD-10-CM

## 2020-07-28 DIAGNOSIS — Z98.890 HISTORY OF PARATHYROID SURGERY: ICD-10-CM

## 2020-07-28 DIAGNOSIS — M62.89 MUSCLE HYPERTONICITY: ICD-10-CM

## 2020-07-28 DIAGNOSIS — M79.641 PAIN IN RIGHT HAND: ICD-10-CM

## 2020-07-28 DIAGNOSIS — E78.5 DYSLIPIDEMIA: ICD-10-CM

## 2020-07-28 PROBLEM — G81.90 HEMIPLEGIA (HCC): Status: RESOLVED | Noted: 2018-09-28 | Resolved: 2020-07-28

## 2020-07-28 PROCEDURE — 99214 OFFICE O/P EST MOD 30 MIN: CPT | Performed by: NURSE PRACTITIONER

## 2020-07-28 RX ORDER — FAMOTIDINE 20 MG/1
20 TABLET, FILM COATED ORAL DAILY
COMMUNITY

## 2020-07-28 RX ORDER — BUMETANIDE 0.5 MG/1
0.5 TABLET ORAL DAILY
COMMUNITY
End: 2020-07-29

## 2020-07-28 RX ORDER — ATORVASTATIN CALCIUM 20 MG/1
20 TABLET, FILM COATED ORAL DAILY
COMMUNITY
End: 2020-07-28

## 2020-07-28 NOTE — PROGRESS NOTES
Subjective   Ania Reyes is a 39 y.o. female.     Chief Complaint   Patient presents with   • Hypertension     follow up    • Hyperlipidemia   • Gynecologic Exam       History of Present Illness   Patient presents for well woman exam and PAP; no concerns today; see well woman exam form; regular menses; some dysmenorrhea, takes Midol and helps.    F/U S/P MVR : takes Metoprolol daily; also takes Bumex and KCL daily; sees Dr. Mccurdy, cardiology; had follow up last month, no changes; does not typically monitor BP; not much exercise; has treadmill at home; no headaches; no orthostasis; no swelling.    F/U Hyperlipidemia: takes Atorvastatin daily; no myalgias; does not really watch diet; not fasting today.    F/U right hand pain/spasticity: takes Baclofen and Diclofenac daily at bedtime and work well; also takes Gabapentin QID per pain management; no home exercises for hand recently; has had some decreased ROM of right thumb; would like to see Beverley OT at [facility] again; has had weakness in right hand and right leg since stroke; no pain with walking.    Sister was present during the history-taking and subsequent discussion with this patient.  Patient agrees to the presence of the individual during this visit.    The following portions of the patient's history were reviewed and updated as appropriate: allergies, current medications, past family history, past medical history, past social history, past surgical history and problem list.    Current Outpatient Medications on File Prior to Visit   Medication Sig   • aspirin 81 MG tablet Take 81 mg by mouth Daily.   • atorvastatin (LIPITOR) 20 MG tablet Take 20 mg by mouth Daily.   • baclofen (LIORESAL) 10 MG tablet Take 1 tablet by mouth Daily.   • bumetanide (BUMEX) 0.5 MG tablet Take 0.5 mg by mouth Daily.   • famotidine (PEPCID) 20 MG tablet Take 20 mg by mouth Daily.   • gabapentin (NEURONTIN) 600 MG tablet Take 600 mg by mouth 4 (Four) Times a Day.   • metoprolol  succinate XL (TOPROL-XL) 25 MG 24 hr tablet Take 25 mg by mouth Daily.   • Multiple Vitamins-Minerals (MULTIVITAMIN ADULTS PO) Take 1 tablet by mouth Daily.   • potassium chloride (K-DUR) 10 MEQ CR tablet Take 10 mEq by mouth 2 (Two) Times a Day.   • VITAMIN D, CHOLECALCIFEROL, PO Take 1 tablet by mouth Daily.     No current facility-administered medications on file prior to visit.        Past Medical History:   Diagnosis Date   • Allergic rhinitis    • Aphasia    • BMI 22.0-22.9, adult    • Depression with anxiety    • Dyslipidemia    • Encounter for immunization    • Fatigue    • Functional murmur    • Gum hypertrophy    • High risk medication use    • History of acute sinusitis    • History of dizziness    • History of echocardiogram    • History of hyperparathyroidism    • History of kidney stones     Bilateral   • History of low back pain    • History of mitral valve repair     [employer] annuloplasty ring, complex MV repair with significant intra-op and post op complications   • History of nephrolithiasis    • History of stroke     LMCA; right hemiplegia   • History of transesophageal echocardiography (PRASHANT)    • Lactase deficiency    • Mitral regurgitation    • Mitral valve prolapse    • Muscle hypertonicity    • Osteopenia    • Pain in right hand    • Pain, wrist joint    • Parathyroid adenoma    • Positive depression screening    • Primary hyperparathyroidism (CMS/HCC)    • Right arm pain    • Sinus tachycardia    • Spastic hemiplegia affecting dominant side (CMS/HCC)    • Vitamin D deficiency        Past Surgical History:   Procedure Laterality Date   • KNEE SURGERY Left    • MITRAL VALVE REPAIR/REPLACEMENT  03/2015    annuloplasty ring, complex MV repair with significant intra-op and post-op complications   • OTHER SURGICAL HISTORY      Selective Arterial Catheterization    • PARATHYROID GLAND SURGERY  2014    Parathyroid resection       Family History   Problem Relation Age of Onset   • Coronary  artery disease Mother    • Glaucoma Mother    • Nephrolithiasis Mother    • No Known Problems Father    • Colon cancer Maternal Grandmother         diagnosed in her 60's   • Breast cancer Maternal Grandmother    • Ovarian cancer Maternal Grandmother    • No Known Problems Other    • Coronary artery disease Maternal Grandfather    • Heart attack Maternal Grandfather          at age 54 years   • Colon cancer Paternal Grandfather    • Breast cancer Maternal Aunt         diagnosed in 40s   • Ovarian cancer Maternal Aunt    • Osteoporosis Maternal Aunt        Social History     Socioeconomic History   • Marital status:      Spouse name: Not on file   • Number of children: Not on file   • Years of education: Not on file   • Highest education level: Not on file   Tobacco Use   • Smoking status: Never Smoker   • Smokeless tobacco: Never Used   Substance and Sexual Activity   • Alcohol use: Yes     Comment: social drinker   • Drug use: No   • Sexual activity: Yes     Partners: Male     Birth control/protection: None       Review of Systems   Constitutional: Negative for appetite change, chills, fatigue, fever, unexpected weight gain and unexpected weight loss.   HENT: Negative for ear pain, sinus pressure, sore throat and trouble swallowing.    Eyes: Negative for blurred vision and pain.   Respiratory: Negative for cough, chest tightness and shortness of breath.    Cardiovascular: Negative for chest pain and palpitations.   Gastrointestinal: Negative for abdominal pain, blood in stool, constipation, diarrhea, GERD and indigestion.   Endocrine: Negative for polydipsia.   Genitourinary: Negative for dysuria and frequency.   Musculoskeletal: Negative for back pain. Arthralgias: only right hand.   Skin: Negative for rash.   Neurological: Negative for syncope and light-headedness.   Hematological: Does not bruise/bleed easily.   Psychiatric/Behavioral: Negative for sleep disturbance and depressed mood. The patient  "is not nervous/anxious.        Objective   Vitals:    07/28/20 0820   BP: 120/62   BP Location: Left arm   Patient Position: Sitting   Cuff Size: Adult   Pulse: 86   Temp: 98.2 °F (36.8 °C)   SpO2: 99%   Weight: 69.9 kg (154 lb 3.2 oz)   Height: 175.3 cm (69\")     Body mass index is 22.77 kg/m².    Physical Exam   Constitutional: She is oriented to person, place, and time. Vital signs are normal. She appears well-developed and well-nourished. No distress.   HENT:   Head: Normocephalic and atraumatic.   Right Ear: Tympanic membrane and external ear normal. No decreased hearing is noted.   Left Ear: Tympanic membrane and external ear normal. No decreased hearing is noted.   Nose: Nose normal. Right sinus exhibits no maxillary sinus tenderness and no frontal sinus tenderness. Left sinus exhibits no maxillary sinus tenderness and no frontal sinus tenderness.   Mouth/Throat: Oropharynx is clear and moist and mucous membranes are normal. No oropharyngeal exudate or posterior oropharyngeal erythema.   Eyes: Pupils are equal, round, and reactive to light. Conjunctivae and EOM are normal.   Neck: Normal range of motion. Neck supple. Carotid bruit is not present. No tracheal deviation present. No thyroid mass and no thyromegaly present.   Cardiovascular: Normal rate, regular rhythm, normal heart sounds and intact distal pulses.   No murmur heard.  Pulmonary/Chest: Effort normal and breath sounds normal. No respiratory distress. Right breast exhibits no mass, no skin change and no tenderness. Left breast exhibits no mass, no skin change and no tenderness. Breasts are symmetrical.   Abdominal: Soft. Bowel sounds are normal. There is no hepatosplenomegaly. There is no tenderness.   Genitourinary: Vagina normal, uterus normal and cervix normal. Pelvic exam was performed with patient supine. There is no rash on the right labia. There is no rash on the left labia. Right adnexum displays no mass and no tenderness. Left adnexum " displays no mass and no tenderness.   Musculoskeletal: Normal range of motion. She exhibits no edema.   Lymphadenopathy:     She has no cervical adenopathy.   Neurological: She is alert and oriented to person, place, and time. She has normal reflexes. No cranial nerve deficit. Coordination normal. Abnormal gait: guarded gait.   Decreased ROM of right thumb and 1st finger; right hand grasp slightly weaker than left; slight weakness in right leg compared to left   Skin: Skin is warm and dry. No rash noted.   Psychiatric: She has a normal mood and affect. Her behavior is normal. Judgment and thought content normal. Cognition and memory are normal.   Nursing note and vitals reviewed.      Lab Results   Component Value Date    WBC 6.4 08/12/2019    RBC 4.03 08/12/2019    HGB 11.8 08/12/2019    HCT 36.1 08/12/2019    MCV 90 08/12/2019    MCH 29.3 08/12/2019    MCHC 32.7 08/12/2019    RDW 13.3 08/12/2019     08/12/2019    NEUTRORELPCT 62 08/12/2019    LYMPHORELPCT 29 08/12/2019    MONORELPCT 7 08/12/2019    EOSRELPCT 1 08/12/2019    BASORELPCT 1 08/12/2019    NEUTROABS 3.9 08/12/2019    LYMPHSABS 1.9 08/12/2019    MONOSABS 0.4 08/12/2019    EOSABS 0.1 08/12/2019    BASOSABS 0.1 08/12/2019     Lab Results   Component Value Date    BUN 12 07/28/2020    CREATININE 0.66 07/28/2020    EGFRIFNONA 101 07/28/2020    EGFRIFAFRI 122 07/28/2020    BCR 18.2 07/28/2020    K 4.0 07/28/2020    CO2 25.1 07/28/2020    CALCIUM 9.0 07/28/2020    PROTENTOTREF 6.5 07/28/2020    ALBUMIN 4.60 07/28/2020    LABIL2 2.4 07/28/2020    AST 16 07/28/2020    ALT 19 07/28/2020      Lab Results   Component Value Date    CHLPL 175 07/28/2020    TRIG 218 (H) 07/28/2020    HDL 44 07/28/2020    VLDL 43.6 07/28/2020    LDL 87 07/28/2020       Assessment/Plan .  Problem List Items Addressed This Visit     Spastic hemiplegia affecting dominant side (CMS/HCC)    Current Assessment & Plan     Continue Baclofen daily at bedtime.         Relevant Medications     diclofenac (VOLTAREN) 50 MG EC tablet    Other Relevant Orders    Ambulatory Referral to Occupational Therapy    Pain in right hand    Current Assessment & Plan     Continue Diclofenac daily as needed.         Relevant Medications    diclofenac (VOLTAREN) 50 MG EC tablet    Muscle hypertonicity    Relevant Medications    diclofenac (VOLTAREN) 50 MG EC tablet    Other Relevant Orders    Ambulatory Referral to Occupational Therapy    Dyslipidemia    Current Assessment & Plan     Continue to work on healthy diet and exercise.         Relevant Orders    Comprehensive Metabolic Panel (Completed)    Lipid Panel With LDL / HDL Ratio (Completed)    History of parathyroid surgery    Relevant Orders    TSH Rfx On Abnormal To Free T4 (Completed)    Other hyperlipidemia     Relevant Orders    TSH Rfx On Abnormal To Free T4 (Completed)      Other Visit Diagnoses     Well woman exam with routine gynecological exam    -  Primary    Relevant Orders    Pap IG (Image Guided)          Return in about 6 months (around 1/28/2021) for Medicare Wellness, Recheck.or sooner if problems or concerns.  Will request last office note from cardiology.  Paper work completed for renewal of parking permit for disabled persons.

## 2020-07-28 NOTE — PATIENT INSTRUCTIONS
Monitor your blood pressure periodically and record results.  Continue to work on healthy diet and exercise.  Follow up pending lab results.  Follow up in 6 months for Medicare Wellness, or sooner if problems or concerns.  Check with pharmacy regarding Tdap vaccine.

## 2020-07-29 LAB
ALBUMIN SERPL-MCNC: 4.6 G/DL (ref 3.5–5.2)
ALBUMIN/GLOB SERPL: 2.4 G/DL
ALP SERPL-CCNC: 81 U/L (ref 39–117)
ALT SERPL-CCNC: 19 U/L (ref 1–33)
AST SERPL-CCNC: 16 U/L (ref 1–32)
BILIRUB SERPL-MCNC: 0.3 MG/DL (ref 0–1.2)
BUN SERPL-MCNC: 12 MG/DL (ref 6–20)
BUN/CREAT SERPL: 18.2 (ref 7–25)
CALCIUM SERPL-MCNC: 9 MG/DL (ref 8.6–10.5)
CHLORIDE SERPL-SCNC: 105 MMOL/L (ref 98–107)
CHOLEST SERPL-MCNC: 175 MG/DL (ref 0–200)
CO2 SERPL-SCNC: 25.1 MMOL/L (ref 22–29)
CREAT SERPL-MCNC: 0.66 MG/DL (ref 0.57–1)
GLOBULIN SER CALC-MCNC: 1.9 GM/DL
GLUCOSE SERPL-MCNC: 75 MG/DL (ref 65–99)
HDLC SERPL-MCNC: 44 MG/DL (ref 40–60)
LDLC SERPL CALC-MCNC: 87 MG/DL (ref 0–100)
LDLC/HDLC SERPL: 1.99 {RATIO}
POTASSIUM SERPL-SCNC: 4 MMOL/L (ref 3.5–5.2)
PROT SERPL-MCNC: 6.5 G/DL (ref 6–8.5)
SODIUM SERPL-SCNC: 139 MMOL/L (ref 136–145)
TRIGL SERPL-MCNC: 218 MG/DL (ref 0–150)
TSH SERPL DL<=0.005 MIU/L-ACNC: 3.22 UIU/ML (ref 0.27–4.2)
VLDLC SERPL CALC-MCNC: 43.6 MG/DL

## 2020-07-30 LAB
CYTOLOGIST CVX/VAG CYTO: NORMAL
CYTOLOGY CVX/VAG DOC CYTO: NORMAL
CYTOLOGY CVX/VAG DOC THIN PREP: NORMAL
DX ICD CODE: NORMAL
HIV 1 & 2 AB SER-IMP: NORMAL
OTHER STN SPEC: NORMAL
STAT OF ADQ CVX/VAG CYTO-IMP: NORMAL

## 2020-08-07 DIAGNOSIS — M62.89 MUSCLE HYPERTONICITY: ICD-10-CM

## 2020-08-07 DIAGNOSIS — M79.641 PAIN IN RIGHT HAND: ICD-10-CM

## 2020-08-07 DIAGNOSIS — G81.10 SPASTIC HEMIPLEGIA AFFECTING DOMINANT SIDE (HCC): ICD-10-CM

## 2020-08-07 RX ORDER — BACLOFEN 10 MG/1
10 TABLET ORAL DAILY
Qty: 90 TABLET | Refills: 0 | Status: SHIPPED | OUTPATIENT
Start: 2020-08-07 | End: 2020-11-17

## 2020-09-25 PROBLEM — I69.398 ABNORMALITY OF GAIT FOLLOWING CEREBROVASCULAR ACCIDENT (CVA): Status: ACTIVE | Noted: 2020-09-25

## 2020-09-25 PROBLEM — R26.9 ABNORMALITY OF GAIT FOLLOWING CEREBROVASCULAR ACCIDENT (CVA): Status: ACTIVE | Noted: 2020-09-25

## 2020-10-30 ENCOUNTER — TELEPHONE (OUTPATIENT)
Dept: FAMILY MEDICINE CLINIC | Facility: CLINIC | Age: 40
End: 2020-10-30

## 2020-11-16 DIAGNOSIS — M79.641 PAIN IN RIGHT HAND: ICD-10-CM

## 2020-11-16 DIAGNOSIS — G81.10 SPASTIC HEMIPLEGIA AFFECTING DOMINANT SIDE (HCC): ICD-10-CM

## 2020-11-16 DIAGNOSIS — M62.89 MUSCLE HYPERTONICITY: ICD-10-CM

## 2020-11-17 RX ORDER — BACLOFEN 10 MG/1
TABLET ORAL
Qty: 90 TABLET | Refills: 0 | Status: SHIPPED | OUTPATIENT
Start: 2020-11-17 | End: 2021-02-17

## 2020-12-07 ENCOUNTER — OFFICE VISIT (OUTPATIENT)
Dept: FAMILY MEDICINE CLINIC | Facility: CLINIC | Age: 40
End: 2020-12-07

## 2020-12-07 VITALS
OXYGEN SATURATION: 99 % | SYSTOLIC BLOOD PRESSURE: 104 MMHG | HEART RATE: 91 BPM | BODY MASS INDEX: 21.88 KG/M2 | DIASTOLIC BLOOD PRESSURE: 80 MMHG | HEIGHT: 69 IN | TEMPERATURE: 98.4 F | WEIGHT: 147.7 LBS

## 2020-12-07 DIAGNOSIS — R10.9 LEFT FLANK PAIN: ICD-10-CM

## 2020-12-07 DIAGNOSIS — R30.0 BURNING WITH URINATION: Primary | ICD-10-CM

## 2020-12-07 LAB
BILIRUB BLD-MCNC: NEGATIVE MG/DL
CLARITY, POC: CLEAR
COLOR UR: NORMAL
GLUCOSE UR STRIP-MCNC: NEGATIVE MG/DL
KETONES UR QL: NEGATIVE
LEUKOCYTE EST, POC: NEGATIVE
NITRITE UR-MCNC: NEGATIVE MG/ML
PH UR: 7 [PH] (ref 5–8)
PROT UR STRIP-MCNC: NEGATIVE MG/DL
RBC # UR STRIP: NEGATIVE /UL
SP GR UR: 1.02 (ref 1–1.03)
UROBILINOGEN UR QL: NORMAL

## 2020-12-07 PROCEDURE — 81003 URINALYSIS AUTO W/O SCOPE: CPT | Performed by: NURSE PRACTITIONER

## 2020-12-07 PROCEDURE — 99213 OFFICE O/P EST LOW 20 MIN: CPT | Performed by: NURSE PRACTITIONER

## 2020-12-07 RX ORDER — SULFAMETHOXAZOLE AND TRIMETHOPRIM 800; 160 MG/1; MG/1
1 TABLET ORAL 2 TIMES DAILY
Qty: 14 TABLET | Refills: 0 | Status: SHIPPED | OUTPATIENT
Start: 2020-12-07 | End: 2020-12-14

## 2020-12-07 RX ORDER — DULOXETIN HYDROCHLORIDE 60 MG/1
60 CAPSULE, DELAYED RELEASE ORAL DAILY
COMMUNITY
End: 2022-03-31 | Stop reason: DRUGHIGH

## 2020-12-07 NOTE — PROGRESS NOTES
Jose Reyes is a 40 y.o. female.     Chief Complaint   Patient presents with   • Urinary Tract Infection     burning with urination   • Back Pain     left side back, 2 weeks        History of Present Illness   Patient presents with c/o burning with urination and left sided back pain x2 weeks; no fever; no nausea or diarrhea; no hematuria; no urinary frequency; no vaginal discharge; no vaginal itching; no lower abdominal pain; symptoms seem to be getting worse; takes Baclofen and Diclofenac for hand pain and has not seemed to help back; no problems urinating; no recent antibiotics; LMP about 2 weeks ago.    The following portions of the patient's history were reviewed and updated as appropriate: allergies, current medications, past family history, past medical history, past social history, past surgical history and problem list.    Current Outpatient Medications on File Prior to Visit   Medication Sig   • aspirin 81 MG tablet Take 81 mg by mouth Daily.   • atorvastatin (LIPITOR) 20 MG tablet Take 20 mg by mouth Daily.   • baclofen (LIORESAL) 10 MG tablet Take 1 tablet by mouth once daily   • diclofenac (VOLTAREN) 50 MG EC tablet TAKE 1 TABLET BY MOUTH TWICE DAILY AS NEEDED (RIGHT  HAND  PAIN)   • DULoxetine (CYMBALTA) 60 MG capsule Take 60 mg by mouth Daily.   • famotidine (PEPCID) 20 MG tablet Take 20 mg by mouth Daily.   • gabapentin (NEURONTIN) 600 MG tablet Take 600 mg by mouth 4 (Four) Times a Day.   • metoprolol succinate XL (TOPROL-XL) 25 MG 24 hr tablet Take 25 mg by mouth Daily.   • Multiple Vitamins-Minerals (MULTIVITAMIN ADULTS PO) Take 1 tablet by mouth Daily.   • potassium chloride (K-DUR) 10 MEQ CR tablet Take 10 mEq by mouth Daily.   • VITAMIN D, CHOLECALCIFEROL, PO Take 1 tablet by mouth Daily.     No current facility-administered medications on file prior to visit.        Past Medical History:   Diagnosis Date   • Allergic rhinitis    • Aphasia    • BMI 22.0-22.9, adult    •  Depression with anxiety    • Dyslipidemia    • Encounter for immunization    • Fatigue    • Functional murmur    • Gum hypertrophy    • High risk medication use    • History of acute sinusitis    • History of dizziness    • History of echocardiogram    • History of hyperparathyroidism    • History of kidney stones     Bilateral   • History of low back pain    • History of mitral valve repair     [employer] annuloplasty ring, complex MV repair with significant intra-op and post op complications   • History of nephrolithiasis    • History of stroke     LMCA; right hemiplegia   • History of transesophageal echocardiography (PRASHANT)    • Lactase deficiency    • Mitral regurgitation    • Mitral valve prolapse    • Muscle hypertonicity    • Osteopenia    • Pain in right hand    • Pain, wrist joint    • Parathyroid adenoma    • Positive depression screening    • Primary hyperparathyroidism (CMS/HCC)    • Right arm pain    • Sinus tachycardia    • Spastic hemiplegia affecting dominant side (CMS/HCC)    • Vitamin D deficiency        Past Surgical History:   Procedure Laterality Date   • KNEE SURGERY Left    • MITRAL VALVE REPAIR/REPLACEMENT  2015    annuloplasty ring, complex MV repair with significant intra-op and post-op complications   • OTHER SURGICAL HISTORY      Selective Arterial Catheterization    • PARATHYROID GLAND SURGERY      Parathyroid resection       Family History   Problem Relation Age of Onset   • Coronary artery disease Mother    • Glaucoma Mother    • Nephrolithiasis Mother    • No Known Problems Father    • Colon cancer Maternal Grandmother         diagnosed in her 60's   • Breast cancer Maternal Grandmother    • Ovarian cancer Maternal Grandmother    • No Known Problems Other    • Coronary artery disease Maternal Grandfather    • Heart attack Maternal Grandfather          at age 54 years   • Colon cancer Paternal Grandfather    • Breast cancer Maternal Aunt         diagnosed in 40s  "  • Ovarian cancer Maternal Aunt    • Osteoporosis Maternal Aunt        Social History     Socioeconomic History   • Marital status:      Spouse name: Not on file   • Number of children: Not on file   • Years of education: Not on file   • Highest education level: Not on file   Tobacco Use   • Smoking status: Never Smoker   • Smokeless tobacco: Never Used   Substance and Sexual Activity   • Alcohol use: Yes     Comment: social drinker   • Drug use: No   • Sexual activity: Yes     Partners: Male     Birth control/protection: None       Review of Systems   Constitutional: Negative for appetite change, fatigue, fever, unexpected weight gain and unexpected weight loss (has been trying to lose weight for heart health).   HENT: Negative for ear pain, sore throat and trouble swallowing.    Respiratory: Negative for cough, chest tightness and shortness of breath.    Cardiovascular: Negative for chest pain, palpitations and leg swelling.   Gastrointestinal: Negative for constipation, diarrhea, GERD and indigestion.   Genitourinary: Positive for dysuria. Negative for urgency.   Musculoskeletal: Positive for back pain (no radiation of pain down leg).   Skin: Negative for rash.   Neurological: Negative for dizziness and headache.       Objective   Vitals:    12/07/20 1410   BP: 104/80   BP Location: Left arm   Patient Position: Sitting   Cuff Size: Adult   Pulse: 91   Temp: 98.4 °F (36.9 °C)   SpO2: 99%   Weight: 67 kg (147 lb 11.2 oz)   Height: 175.3 cm (69\")     Body mass index is 21.81 kg/m².    Physical Exam  Vitals signs and nursing note reviewed.   Constitutional:       General: She is not in acute distress.     Appearance: She is well-developed and well-groomed. She is not diaphoretic.   HENT:      Head: Normocephalic.      Right Ear: External ear normal.      Left Ear: External ear normal.   Eyes:      Conjunctiva/sclera: Conjunctivae normal.   Neck:      Musculoskeletal: Normal range of motion and neck supple.    "   Vascular: No carotid bruit.   Cardiovascular:      Rate and Rhythm: Normal rate and regular rhythm.      Pulses: Normal pulses.      Heart sounds: Normal heart sounds. No murmur.   Pulmonary:      Effort: Pulmonary effort is normal. No respiratory distress.      Breath sounds: Normal breath sounds.   Abdominal:      General: Bowel sounds are normal.      Palpations: Abdomen is soft. There is no hepatomegaly or splenomegaly.      Tenderness: There is abdominal tenderness in the left upper quadrant and left lower quadrant. There is left CVA tenderness. There is no guarding or rebound.   Musculoskeletal:      Cervical back: She exhibits no bony tenderness.      Thoracic back: She exhibits no bony tenderness.      Lumbar back: She exhibits no bony tenderness.      Right lower leg: No edema.      Left lower leg: No edema.      Comments: Negative SLR bilaterally; no pain with internal or external rotation of bilateral hips   Skin:     General: Skin is warm and dry.      Findings: No rash.   Neurological:      Mental Status: She is alert and oriented to person, place, and time.      Motor: Weakness: mild weakness of right upper and right lower extremity.      Gait: Abnormal gait: mild limping on right.      Deep Tendon Reflexes:      Reflex Scores:       Patellar reflexes are 2+ on the right side and 2+ on the left side.  Psychiatric:         Mood and Affect: Mood normal.         Behavior: Behavior normal.         Thought Content: Thought content normal.         Judgment: Judgment normal.         Lab Results   Component Value Date    WBC 6.4 08/12/2019    RBC 4.03 08/12/2019    HGB 11.8 08/12/2019    HCT 36.1 08/12/2019    MCV 90 08/12/2019    MCH 29.3 08/12/2019    MCHC 32.7 08/12/2019    RDW 13.3 08/12/2019     08/12/2019    NEUTRORELPCT 62 08/12/2019    LYMPHORELPCT 29 08/12/2019    MONORELPCT 7 08/12/2019    EOSRELPCT 1 08/12/2019    BASORELPCT 1 08/12/2019    NEUTROABS 3.9 08/12/2019    LYMPHSABS 1.9  08/12/2019    MONOSABS 0.4 08/12/2019    EOSABS 0.1 08/12/2019    BASOSABS 0.1 08/12/2019     Lab Results   Component Value Date    BUN 12 07/28/2020    CREATININE 0.66 07/28/2020    EGFRIFNONA 101 07/28/2020    EGFRIFAFRI 122 07/28/2020    BCR 18.2 07/28/2020    K 4.0 07/28/2020    CO2 25.1 07/28/2020    CALCIUM 9.0 07/28/2020    PROTENTOTREF 6.5 07/28/2020    ALBUMIN 4.60 07/28/2020    LABIL2 2.4 07/28/2020    AST 16 07/28/2020    ALT 19 07/28/2020      Lab Results   Component Value Date    CHLPL 175 07/28/2020    TRIG 218 (H) 07/28/2020    HDL 44 07/28/2020    VLDL 43.6 07/28/2020    LDL 87 07/28/2020     Lab Results   Component Value Date    TSH 3.220 07/28/2020       Assessment/Plan .  Problem List Items Addressed This Visit     Burning with urination - Primary    Relevant Medications    sulfamethoxazole-trimethoprim (Bactrim DS) 800-160 MG per tablet    Other Relevant Orders    POCT urinalysis dipstick, automated (Completed)    Urine Culture - Urine, Urine, Clean Catch    Left flank pain    Current Assessment & Plan     Increase intake of clear liquids and rest.  Try ice/heat to left lower back, whichever feels better.         Relevant Medications    sulfamethoxazole-trimethoprim (Bactrim DS) 800-160 MG per tablet          Return if symptoms worsen or fail to improve.  Will get CT abdomen/pelvis if symptoms persist or worsen.  ER warnings given.

## 2020-12-07 NOTE — PATIENT INSTRUCTIONS
Increase intake of clear liquids and rest.  Try ice/heat to left lower back, whichever feels better.  Follow up pending lab results.  Follow up if symptoms persist or worsen.

## 2020-12-08 NOTE — ASSESSMENT & PLAN NOTE
Increase intake of clear liquids and rest.  Try ice/heat to left lower back, whichever feels better.

## 2020-12-09 DIAGNOSIS — Z87.442 HISTORY OF KIDNEY STONES: ICD-10-CM

## 2020-12-09 DIAGNOSIS — R10.9 LEFT FLANK PAIN: Primary | ICD-10-CM

## 2020-12-09 LAB
BACTERIA UR CULT: NO GROWTH
BACTERIA UR CULT: NORMAL

## 2020-12-12 LAB
ALBUMIN SERPL-MCNC: 4.2 G/DL (ref 3.5–5.2)
ALBUMIN/GLOB SERPL: 1.9 G/DL
ALP SERPL-CCNC: 83 U/L (ref 39–117)
ALT SERPL-CCNC: 33 U/L (ref 1–33)
AST SERPL-CCNC: 39 U/L (ref 1–32)
B-HCG SERPL QL: NEGATIVE
BASOPHILS # BLD AUTO: 0.06 10*3/MM3 (ref 0–0.2)
BASOPHILS NFR BLD AUTO: 1.1 % (ref 0–1.5)
BILIRUB SERPL-MCNC: 0.4 MG/DL (ref 0–1.2)
BUN SERPL-MCNC: 15 MG/DL (ref 6–20)
BUN/CREAT SERPL: 17.4 (ref 7–25)
CALCIUM SERPL-MCNC: 9.2 MG/DL (ref 8.6–10.5)
CHLORIDE SERPL-SCNC: 100 MMOL/L (ref 98–107)
CO2 SERPL-SCNC: 27.6 MMOL/L (ref 22–29)
CREAT SERPL-MCNC: 0.86 MG/DL (ref 0.57–1)
EOSINOPHIL # BLD AUTO: 0.09 10*3/MM3 (ref 0–0.4)
EOSINOPHIL NFR BLD AUTO: 1.7 % (ref 0.3–6.2)
ERYTHROCYTE [DISTWIDTH] IN BLOOD BY AUTOMATED COUNT: 12.3 % (ref 12.3–15.4)
GLOBULIN SER CALC-MCNC: 2.2 GM/DL
GLUCOSE SERPL-MCNC: 75 MG/DL (ref 65–99)
HCT VFR BLD AUTO: 40.2 % (ref 34–46.6)
HGB BLD-MCNC: 12.9 G/DL (ref 12–15.9)
IMM GRANULOCYTES # BLD AUTO: 0.01 10*3/MM3 (ref 0–0.05)
IMM GRANULOCYTES NFR BLD AUTO: 0.2 % (ref 0–0.5)
LYMPHOCYTES # BLD AUTO: 1.64 10*3/MM3 (ref 0.7–3.1)
LYMPHOCYTES NFR BLD AUTO: 31.2 % (ref 19.6–45.3)
MCH RBC QN AUTO: 29.2 PG (ref 26.6–33)
MCHC RBC AUTO-ENTMCNC: 32.1 G/DL (ref 31.5–35.7)
MCV RBC AUTO: 91 FL (ref 79–97)
MONOCYTES # BLD AUTO: 0.5 10*3/MM3 (ref 0.1–0.9)
MONOCYTES NFR BLD AUTO: 9.5 % (ref 5–12)
NEUTROPHILS # BLD AUTO: 2.96 10*3/MM3 (ref 1.7–7)
NEUTROPHILS NFR BLD AUTO: 56.3 % (ref 42.7–76)
NRBC BLD AUTO-RTO: 0 /100 WBC (ref 0–0.2)
PLATELET # BLD AUTO: 207 10*3/MM3 (ref 140–450)
POTASSIUM SERPL-SCNC: 3.9 MMOL/L (ref 3.5–5.2)
PROT SERPL-MCNC: 6.4 G/DL (ref 6–8.5)
RBC # BLD AUTO: 4.42 10*6/MM3 (ref 3.77–5.28)
SODIUM SERPL-SCNC: 135 MMOL/L (ref 136–145)
WBC # BLD AUTO: 5.26 10*3/MM3 (ref 3.4–10.8)

## 2020-12-14 ENCOUNTER — RESULTS ENCOUNTER (OUTPATIENT)
Dept: FAMILY MEDICINE CLINIC | Facility: CLINIC | Age: 40
End: 2020-12-14

## 2020-12-14 DIAGNOSIS — R10.9 LEFT FLANK PAIN: ICD-10-CM

## 2020-12-21 ENCOUNTER — HOSPITAL ENCOUNTER (OUTPATIENT)
Dept: CT IMAGING | Facility: HOSPITAL | Age: 40
Discharge: HOME | End: 2020-12-21
Admitting: NURSE PRACTITIONER

## 2020-12-21 DIAGNOSIS — N13.30 HYDROURETERONEPHROSIS: ICD-10-CM

## 2020-12-21 DIAGNOSIS — R10.9 LEFT FLANK PAIN: ICD-10-CM

## 2020-12-21 DIAGNOSIS — N20.0 LEFT NEPHROLITHIASIS: Primary | ICD-10-CM

## 2020-12-21 DIAGNOSIS — Z87.442 HISTORY OF KIDNEY STONES: ICD-10-CM

## 2020-12-21 PROCEDURE — 74178 CT ABD&PLV WO CNTR FLWD CNTR: CPT

## 2020-12-21 PROCEDURE — 25010000002 IOPAMIDOL 61 % SOLUTION: Performed by: NURSE PRACTITIONER

## 2020-12-21 PROCEDURE — 0 DIATRIZOATE MEGLUMINE & SODIUM PER 1 ML: Performed by: NURSE PRACTITIONER

## 2020-12-21 RX ADMIN — IOPAMIDOL 85 ML: 612 INJECTION, SOLUTION INTRAVENOUS at 09:03

## 2020-12-21 RX ADMIN — DIATRIZOATE MEGLUMINE AND DIATRIZOATE SODIUM 30 ML: 660; 100 LIQUID ORAL; RECTAL at 07:46

## 2021-02-02 DIAGNOSIS — M62.89 MUSCLE HYPERTONICITY: ICD-10-CM

## 2021-02-02 DIAGNOSIS — G81.10 SPASTIC HEMIPLEGIA AFFECTING DOMINANT SIDE (HCC): ICD-10-CM

## 2021-02-02 DIAGNOSIS — M79.641 PAIN IN RIGHT HAND: ICD-10-CM

## 2021-02-16 DIAGNOSIS — G81.10 SPASTIC HEMIPLEGIA AFFECTING DOMINANT SIDE (HCC): ICD-10-CM

## 2021-02-16 DIAGNOSIS — M79.641 PAIN IN RIGHT HAND: ICD-10-CM

## 2021-02-16 DIAGNOSIS — M62.89 MUSCLE HYPERTONICITY: ICD-10-CM

## 2021-02-17 RX ORDER — BACLOFEN 10 MG/1
TABLET ORAL
Qty: 90 TABLET | Refills: 0 | Status: SHIPPED | OUTPATIENT
Start: 2021-02-17 | End: 2021-05-24

## 2021-03-02 ENCOUNTER — OFFICE VISIT (OUTPATIENT)
Dept: FAMILY MEDICINE CLINIC | Facility: CLINIC | Age: 41
End: 2021-03-02

## 2021-03-02 VITALS
HEART RATE: 84 BPM | DIASTOLIC BLOOD PRESSURE: 74 MMHG | TEMPERATURE: 97.7 F | BODY MASS INDEX: 20.86 KG/M2 | WEIGHT: 140.8 LBS | HEIGHT: 69 IN | SYSTOLIC BLOOD PRESSURE: 90 MMHG | OXYGEN SATURATION: 99 %

## 2021-03-02 DIAGNOSIS — Z98.890 HISTORY OF MITRAL VALVE REPAIR: ICD-10-CM

## 2021-03-02 DIAGNOSIS — Z11.59 NEED FOR HEPATITIS C SCREENING TEST: ICD-10-CM

## 2021-03-02 DIAGNOSIS — R63.4 UNINTENTIONAL WEIGHT LOSS: ICD-10-CM

## 2021-03-02 DIAGNOSIS — M79.641 PAIN IN RIGHT HAND: ICD-10-CM

## 2021-03-02 DIAGNOSIS — E78.5 DYSLIPIDEMIA: ICD-10-CM

## 2021-03-02 DIAGNOSIS — G81.10 SPASTIC HEMIPLEGIA AFFECTING DOMINANT SIDE (HCC): ICD-10-CM

## 2021-03-02 DIAGNOSIS — N20.0 LEFT NEPHROLITHIASIS: ICD-10-CM

## 2021-03-02 DIAGNOSIS — Z00.00 ENCOUNTER FOR MEDICARE ANNUAL WELLNESS EXAM: Primary | ICD-10-CM

## 2021-03-02 DIAGNOSIS — M62.89 MUSCLE HYPERTONICITY: ICD-10-CM

## 2021-03-02 DIAGNOSIS — R10.9 LEFT FLANK PAIN: ICD-10-CM

## 2021-03-02 PROBLEM — R30.0 BURNING WITH URINATION: Status: RESOLVED | Noted: 2020-12-07 | Resolved: 2021-03-02

## 2021-03-02 PROCEDURE — 99213 OFFICE O/P EST LOW 20 MIN: CPT | Performed by: NURSE PRACTITIONER

## 2021-03-02 PROCEDURE — 1159F MED LIST DOCD IN RCRD: CPT | Performed by: NURSE PRACTITIONER

## 2021-03-02 PROCEDURE — G0439 PPPS, SUBSEQ VISIT: HCPCS | Performed by: NURSE PRACTITIONER

## 2021-03-02 PROCEDURE — 1170F FXNL STATUS ASSESSED: CPT | Performed by: NURSE PRACTITIONER

## 2021-03-02 RX ORDER — DICLOFENAC POTASSIUM 50 MG/1
50 TABLET, FILM COATED ORAL DAILY
COMMUNITY
End: 2021-03-02

## 2021-03-02 RX ORDER — SULFAMETHOXAZOLE AND TRIMETHOPRIM 800; 160 MG/1; MG/1
800 TABLET ORAL DAILY
COMMUNITY
End: 2021-03-02

## 2021-03-02 NOTE — ASSESSMENT & PLAN NOTE
Continue Metoprolol daily.  Monitor your blood pressure periodically and record results.  Follow up as scheduled with Dr. Mccurdy, cardiology.

## 2021-03-02 NOTE — PROGRESS NOTES
The ABCs of the Annual Wellness Visit  Subsequent Medicare Wellness Visit    Chief Complaint   Patient presents with   • Annual Exam     mcwe, blood work    • Hypertension     recheck        Subjective   History of Present Illness:  Ania Reyes is a 40 y.o. female who presents for a Subsequent Medicare Wellness Visit.  In addition, we addressed the following health issues:    F/U S/P MVR: takes Metoprolol daily; does not typically monitor BP; no headaches; no orthostasis; no swelling; sees Dr. Mccurdy, cardiology; has follow up on 6/15/21.    F/U Hyperlipidemia: takes Atorvastatin daily; no myalgias; pretty healthy diet; not much exercise; fasting today.    F/U right hand pain/spasticity: takes Baclofen daily and Diclofenac twice daily and helps; did occupational therapy and helped; does exercises on occasion; takes Gabapentin QID and Cymbalta daily and helps; sees pain management.    F/U left flank pain: had lithotripsy and symptoms improved; did not pass any remnants; had follow up US and x-ray and found stone in right kidney that is 3 mm; still has discomfort in left flank area, but has improved; saw Dr. Davis [facility].      HEALTH RISK ASSESSMENT    Recent Hospitalizations:  No hospitalization(s) within the last year.    Current Medical Providers:  Patient Care Team:  Cinthia Mark APRN as PCP - General (Family Medicine)  Betty Mccurdy MD as Consulting Physician (Cardiology)  Amanda Escobar (Nurse Practitioner)  Todd Davis MD as Consulting Physician (Urology)  Kellie Peterson APRN as Nurse Practitioner (Nurse Practitioner)    Smoking Status:  Social History     Tobacco Use   Smoking Status Never Smoker   Smokeless Tobacco Never Used       Alcohol Consumption:  Social History     Substance and Sexual Activity   Alcohol Use Yes    Comment: social drinker       Depression Screen:   PHQ-2/PHQ-9 Depression Screening 3/2/2021   Little interest or pleasure in doing things 0   Feeling down,  depressed, or hopeless 0   Trouble falling or staying asleep, or sleeping too much 0   Feeling tired or having little energy 0   Poor appetite or overeating 1   Feeling bad about yourself - or that you are a failure or have let yourself or your family down 0   Trouble concentrating on things, such as reading the newspaper or watching television 0   Moving or speaking so slowly that other people could have noticed. Or the opposite - being so fidgety or restless that you have been moving around a lot more than usual 0   Thoughts that you would be better off dead, or of hurting yourself in some way 0   Total Score 1       Fall Risk Screen:  LAURYN Fall Risk Assessment was completed, and patient is at LOW risk for falls.Assessment completed on:3/2/2021    Health Habits and Functional and Cognitive Screening:  Functional & Cognitive Status 3/2/2021   Do you have difficulty preparing food and eating? No   Do you have difficulty bathing yourself, getting dressed or grooming yourself? No   Do you have difficulty using the toilet? No   Do you have difficulty moving around from place to place? No   Do you have trouble with steps or getting out of a bed or a chair? No   Current Diet Well Balanced Diet   Dental Exam Up to date   Eye Exam Up to date   Exercise (times per week) 0 times per week   Current Exercises Include No Regular Exercise   Current Exercise Activities Include -   Do you need help using the phone?  No   Are you deaf or do you have serious difficulty hearing?  No   Do you need help with transportation? No   Do you need help shopping? No   Do you need help preparing meals?  No   Do you need help with housework?  No   Do you need help with laundry? No   Do you need help taking your medications? No   Do you need help managing money? No   Do you ever drive or ride in a car without wearing a seat belt? No   Have you felt unusual stress, anger or loneliness in the last month? No   Who do you live with? Alone   If you  need help, do you have trouble finding someone available to you? No   Have you been bothered in the last four weeks by sexual problems? No   Do you have difficulty concentrating, remembering or making decisions? No         Does the patient have evidence of cognitive impairment? No    Asprin use counseling:Taking ASA appropriately as indicated    Age-appropriate Screening Schedule:  Refer to the list below for future screening recommendations based on patient's age, sex and/or medical conditions. Orders for these recommended tests are listed in the plan section. The patient has been provided with a written plan.    Health Maintenance   Topic Date Due   • LIPID PANEL  07/28/2021   • PAP SMEAR  07/28/2023   • TDAP/TD VACCINES (3 - Td) 12/07/2030   • INFLUENZA VACCINE  Completed          The following portions of the patient's history were reviewed and updated as appropriate: allergies, past family history, past medical history, past social history, past surgical history and problem list.    Outpatient Medications Prior to Visit   Medication Sig Dispense Refill   • aspirin 81 MG tablet Take 81 mg by mouth Daily.     • atorvastatin (LIPITOR) 20 MG tablet Take 20 mg by mouth Daily.     • baclofen (LIORESAL) 10 MG tablet Take 1 tablet by mouth once daily 90 tablet 0   • diclofenac (VOLTAREN) 50 MG EC tablet TAKE 1 TABLET BY MOUTH TWICE DAILY AS NEEDED (RIGHT  HAND  PAIN) 180 tablet 0   • DULoxetine (CYMBALTA) 60 MG capsule Take 60 mg by mouth Daily.     • famotidine (PEPCID) 20 MG tablet Take 20 mg by mouth Daily.     • gabapentin (NEURONTIN) 600 MG tablet Take 600 mg by mouth 4 (Four) Times a Day.  5   • metoprolol succinate XL (TOPROL-XL) 25 MG 24 hr tablet Take 25 mg by mouth Daily.  3   • Multiple Vitamins-Minerals (MULTIVITAMIN ADULTS PO) Take 1 tablet by mouth Daily.     • potassium chloride (K-DUR) 10 MEQ CR tablet Take 10 mEq by mouth Daily.     • VITAMIN D, CHOLECALCIFEROL, PO Take 1 tablet by mouth Daily.     •  ASPIRIN 81 PO Take 81 mg by mouth Daily.     • diclofenac (CATAFLAM) 50 MG tablet Take 50 mg by mouth Daily.     • sulfamethoxazole-trimethoprim (BACTRIM DS,SEPTRA DS) 800-160 MG per tablet Take 800 mg by mouth Daily.       No facility-administered medications prior to visit.        Patient Active Problem List   Diagnosis   • Spastic hemiplegia affecting dominant side (CMS/HCC)   • Pain in right hand   • Muscle hypertonicity   • Mitral regurgitation   • History of stroke   • History of mitral valve repair   • High risk medication use   • Functional murmur   • Dyslipidemia   • Allergic rhinitis   • Need for SBE (subacute bacterial endocarditis) prophylaxis   • Osteopenia   • Vitamin D deficiency   • History of parathyroid surgery   • Abnormality of gait following cerebrovascular accident (CVA)   • Left flank pain   • Hydroureteronephrosis   • Unintentional weight loss       Advanced Care Planning:  ACP discussion was held with the patient during this visit. Patient does not have an advance directive, information provided.    Review of Systems   Constitutional: Positive for unexpected weight change. Negative for appetite change (eats twice daily; some decrease in appetite) and fatigue.   HENT: Negative for ear pain, sinus pressure, sore throat and trouble swallowing.    Eyes: Negative for pain and discharge.   Respiratory: Negative for cough, chest tightness and shortness of breath.    Cardiovascular: Negative for chest pain and palpitations.   Gastrointestinal: Negative for abdominal pain, blood in stool, constipation and diarrhea.   Endocrine: Negative for cold intolerance, heat intolerance and polydipsia.   Genitourinary: Negative for dysuria and frequency.   Musculoskeletal: Arthralgias: right hand. Back pain: see HPI.   Skin: Negative for rash.   Neurological: Negative for syncope and light-headedness.   Hematological: Does not bruise/bleed easily.   Psychiatric/Behavioral: Negative for sleep disturbance and  "suicidal ideas.       Compared to one year ago, the patient feels her physical health is the same.  Compared to one year ago, the patient feels her mental health is the same.    Reviewed chart for potential of high risk medication in the elderly: yes  Reviewed chart for potential of harmful drug interactions in the elderly:yes    Objective         Vitals:    03/02/21 1038 03/02/21 1151 03/02/21 1152   BP: 90/60 100/80 90/74   BP Location: Left arm Left arm Left arm   Patient Position: Sitting Sitting Standing   Cuff Size: Adult     Pulse: 84     Temp: 97.7 °F (36.5 °C)     SpO2: 99%     Weight: 63.9 kg (140 lb 12.8 oz)     Height: 175.3 cm (69\")     PainSc:   0-No pain       Body mass index is 20.79 kg/m².  Discussed the patient's BMI with her. The BMI is in the acceptable range.    Physical Exam  Vitals signs and nursing note reviewed.   Constitutional:       General: She is not in acute distress.     Appearance: She is well-developed and well-groomed. She is not diaphoretic.   HENT:      Head: Normocephalic and atraumatic.      Jaw: No tenderness or pain on movement.      Right Ear: Tympanic membrane and external ear normal. No decreased hearing noted.      Left Ear: Tympanic membrane and external ear normal. No decreased hearing noted.      Nose: Nose normal.      Right Sinus: No maxillary sinus tenderness or frontal sinus tenderness.      Left Sinus: No maxillary sinus tenderness or frontal sinus tenderness.      Mouth/Throat:      Mouth: Mucous membranes are moist.      Pharynx: No oropharyngeal exudate or posterior oropharyngeal erythema.   Eyes:      Extraocular Movements: Extraocular movements intact.      Conjunctiva/sclera: Conjunctivae normal.      Pupils: Pupils are equal, round, and reactive to light.   Neck:      Musculoskeletal: Normal range of motion and neck supple.      Thyroid: No thyroid mass or thyromegaly.      Vascular: No carotid bruit.      Trachea: No tracheal deviation.   Cardiovascular: "      Rate and Rhythm: Normal rate and regular rhythm.      Pulses: Normal pulses.      Heart sounds: Normal heart sounds. No murmur.   Pulmonary:      Effort: Pulmonary effort is normal. No respiratory distress.      Breath sounds: Normal breath sounds.   Abdominal:      General: Bowel sounds are normal.      Palpations: Abdomen is soft. There is no hepatomegaly or splenomegaly.      Tenderness: There is no abdominal tenderness. There is no right CVA tenderness or left CVA tenderness.   Musculoskeletal: Normal range of motion.      Cervical back: She exhibits no bony tenderness.      Thoracic back: She exhibits no bony tenderness.      Lumbar back: She exhibits no bony tenderness.      Right hand: Decreased range of motion: some decreased ROM of thumb and 1st finger, minmal weakness of hand grasp compared to left       Right lower leg: No edema.      Left lower leg: No edema.   Lymphadenopathy:      Cervical: No cervical adenopathy.   Skin:     General: Skin is warm and dry.      Findings: No rash.   Neurological:      Mental Status: She is alert and oriented to person, place, and time.      Cranial Nerves: No cranial nerve deficit.      Coordination: Coordination normal.      Gait: Gait normal.      Deep Tendon Reflexes: Reflexes are normal and symmetric.      Comments: Slight weakness of right leg compared to left; wearing brace on right ankle.   Psychiatric:         Mood and Affect: Mood normal.         Behavior: Behavior normal.         Thought Content: Thought content normal.         Cognition and Memory: Cognition normal.         Judgment: Judgment normal.         Lab Results   Component Value Date    GLU 75 12/11/2020      Lab Results   Component Value Date    WBC 5.26 12/11/2020    RBC 4.42 12/11/2020    HGB 12.9 12/11/2020    HCT 40.2 12/11/2020    MCV 91.0 12/11/2020    MCH 29.2 12/11/2020    MCHC 32.1 12/11/2020    RDW 12.3 12/11/2020     12/11/2020    NEUTRORELPCT 56.3 12/11/2020    LYMPHORELPCT  31.2 12/11/2020    MONORELPCT 9.5 12/11/2020    EOSRELPCT 1.7 12/11/2020    BASORELPCT 1.1 12/11/2020    NEUTROABS 2.96 12/11/2020    LYMPHSABS 1.64 12/11/2020    MONOSABS 0.50 12/11/2020    EOSABS 0.09 12/11/2020    BASOSABS 0.06 12/11/2020    NRBC 0.0 12/11/2020     Lab Results   Component Value Date    BUN 15 12/11/2020    CREATININE 0.86 12/11/2020    EGFRIFNONA 74 12/11/2020    EGFRIFAFRI 90 12/11/2020    BCR 17.4 12/11/2020    K 3.9 12/11/2020    CO2 27.6 12/11/2020    CALCIUM 9.2 12/11/2020    PROTENTOTREF 6.4 12/11/2020    ALBUMIN 4.20 12/11/2020    LABIL2 1.9 12/11/2020    AST 39 (H) 12/11/2020    ALT 33 12/11/2020      Lab Results   Component Value Date    CHLPL 175 07/28/2020    TRIG 218 (H) 07/28/2020    HDL 44 07/28/2020    VLDL 43.6 07/28/2020    LDL 87 07/28/2020     Lab Results   Component Value Date    TSH 3.220 07/28/2020         Assessment/Plan   Medicare Risks and Personalized Health Plan  CMS Preventative Services Quick Reference  Advance Directive Discussion  Inactivity/Sedentary       Discussed low risk for falls and recommended avoiding throw rugs, installing grab bars in bathroom, making sure have handrails on steps, etc.    The above risks/problems have been discussed with the patient.  Pertinent information has been shared with the patient in the After Visit Summary.  Follow up plans and orders are seen below in the Assessment/Plan Section.    Diagnoses and all orders for this visit:    1. Encounter for Medicare annual wellness exam (Primary)  -     Lipid Panel With LDL / HDL Ratio  -     Comprehensive Metabolic Panel  -     Hepatitis C Antibody    2. Dyslipidemia  Assessment & Plan:  Continue Atorvastatin daily.  Continue to work on healthy diet and exercise.    Orders:  -     Lipid Panel With LDL / HDL Ratio  -     Comprehensive Metabolic Panel    3. Spastic hemiplegia affecting dominant side (CMS/[facility])  Assessment & Plan:  Stable.  Continue home exercises.  Continue brace on right  leg/ankle.    Orders:  -     Comprehensive Metabolic Panel    4. Need for hepatitis C screening test  -     Hepatitis C Antibody    5. Left flank pain  Assessment & Plan:  Follow up as scheduled with urology.      6. Left nephrolithiasis    7. Muscle hypertonicity  Assessment & Plan:  Stable with Baclofen and Diclofenac.      8. Pain in right hand  Assessment & Plan:  Stable with Baclofen and Diclofenac.  Follow up as scheduled with pain management.      9. History of mitral valve repair  Assessment & Plan:  Continue Metoprolol daily.  Monitor your blood pressure periodically and record results.  Follow up as scheduled with Dr. Mccurdy, cardiology.      10. Unintentional weight loss  Assessment & Plan:  Make sure eating consistent meals.      Follow Up:  Return in about 6 months (around 9/2/2021) for Recheck.or sooner if symptoms persist or worsen.  Discussed weight has decreased over the last year; pt has not noticed; pt typically only eats 2 meals daily, just because not hungry; instructed to monitor weight and follow up if weight loss continues.    Will request records from [facility].    An After Visit Summary and PPPS were given to the patient.

## 2021-03-03 LAB
ALBUMIN SERPL-MCNC: 4.5 G/DL (ref 3.8–4.8)
ALBUMIN/GLOB SERPL: 1.9 {RATIO} (ref 1.2–2.2)
ALP SERPL-CCNC: 103 IU/L (ref 39–117)
ALT SERPL-CCNC: 16 IU/L (ref 0–32)
AST SERPL-CCNC: 15 IU/L (ref 0–40)
BILIRUB SERPL-MCNC: 0.5 MG/DL (ref 0–1.2)
BUN SERPL-MCNC: 12 MG/DL (ref 6–20)
BUN/CREAT SERPL: 14 (ref 9–23)
CALCIUM SERPL-MCNC: 9.8 MG/DL (ref 8.7–10.2)
CHLORIDE SERPL-SCNC: 102 MMOL/L (ref 96–106)
CHOLEST SERPL-MCNC: 147 MG/DL (ref 100–199)
CO2 SERPL-SCNC: 25 MMOL/L (ref 20–29)
CREAT SERPL-MCNC: 0.85 MG/DL (ref 0.57–1)
GLOBULIN SER CALC-MCNC: 2.4 G/DL (ref 1.5–4.5)
GLUCOSE SERPL-MCNC: 81 MG/DL (ref 65–99)
HCV AB S/CO SERPL IA: <0.1 S/CO RATIO (ref 0–0.9)
HDLC SERPL-MCNC: 47 MG/DL
LDLC SERPL CALC-MCNC: 71 MG/DL (ref 0–99)
LDLC/HDLC SERPL: 1.5 RATIO (ref 0–3.2)
POTASSIUM SERPL-SCNC: 4 MMOL/L (ref 3.5–5.2)
PROT SERPL-MCNC: 6.9 G/DL (ref 6–8.5)
SODIUM SERPL-SCNC: 142 MMOL/L (ref 134–144)
TRIGL SERPL-MCNC: 170 MG/DL (ref 0–149)
VLDLC SERPL CALC-MCNC: 29 MG/DL (ref 5–40)

## 2021-03-22 ENCOUNTER — OFFICE VISIT (OUTPATIENT)
Dept: FAMILY MEDICINE CLINIC | Facility: CLINIC | Age: 41
End: 2021-03-22

## 2021-03-22 VITALS
SYSTOLIC BLOOD PRESSURE: 108 MMHG | DIASTOLIC BLOOD PRESSURE: 74 MMHG | OXYGEN SATURATION: 99 % | WEIGHT: 140 LBS | HEART RATE: 78 BPM | HEIGHT: 69 IN | TEMPERATURE: 97.7 F | BODY MASS INDEX: 20.73 KG/M2

## 2021-03-22 DIAGNOSIS — Z86.73 HISTORY OF STROKE: ICD-10-CM

## 2021-03-22 DIAGNOSIS — M62.89 MUSCLE HYPERTONICITY: ICD-10-CM

## 2021-03-22 DIAGNOSIS — R63.4 UNINTENTIONAL WEIGHT LOSS: ICD-10-CM

## 2021-03-22 DIAGNOSIS — G81.10 SPASTIC HEMIPLEGIA AFFECTING DOMINANT SIDE (HCC): Primary | ICD-10-CM

## 2021-03-22 PROCEDURE — 99213 OFFICE O/P EST LOW 20 MIN: CPT | Performed by: NURSE PRACTITIONER

## 2021-03-22 NOTE — PATIENT INSTRUCTIONS
Continue Diclofenac twice daily.  Continue Baclofen at bedtime.  Continue to monitor your weight.  Follow up if symptoms persist or worsen.

## 2021-03-22 NOTE — PROGRESS NOTES
Subjective   Ania Reyes is a 40 y.o. female.     Chief Complaint   Patient presents with   • toes curling     right foot-   would like referral to Stroke specialist       History of Present Illness   Patient presents with c/o toes curling up on right foot at times for last couple of weeks; will get tightening of muscles in right foot; no pain, hard to walk at times; no particular activity or movement triggers; has brace that wears and helps; feels like has not been getting the stability that she was getting when first started wearing brace; feels unstable when walking, even with brace; occasional weakness; does not feel like patti horse; no change in chronic symptoms of right hand; feels like foot is starting to do what happens in hand; takes Gabapentin QID, Duloxetine and Baclofen daily, and Diclofenac twice daily; history of stroke; no recent injury or fall; had recent occupational and physical therapy; has been wearing brace on right ankle and has helped; told not to wear regularly; has not seen neurology for about 4 years; has upcoming appointment with podiatry, but would like referral to neurology.    Sister was present during the history-taking and subsequent discussion with this patient.  Patient agrees to the presence of the individual during this visit.    The following portions of the patient's history were reviewed and updated as appropriate: allergies, current medications, past family history, past medical history, past social history, past surgical history and problem list.    Current Outpatient Medications on File Prior to Visit   Medication Sig   • aspirin 81 MG tablet Take 81 mg by mouth Daily.   • atorvastatin (LIPITOR) 20 MG tablet Take 20 mg by mouth Daily.   • baclofen (LIORESAL) 10 MG tablet Take 1 tablet by mouth once daily   • diclofenac (VOLTAREN) 50 MG EC tablet TAKE 1 TABLET BY MOUTH TWICE DAILY AS NEEDED (RIGHT  HAND  PAIN)   • DULoxetine (CYMBALTA) 60 MG capsule Take 60 mg by mouth  Daily.   • famotidine (PEPCID) 20 MG tablet Take 20 mg by mouth Daily.   • gabapentin (NEURONTIN) 600 MG tablet Take 600 mg by mouth 4 (Four) Times a Day.   • metoprolol succinate XL (TOPROL-XL) 25 MG 24 hr tablet Take 25 mg by mouth Daily.   • Multiple Vitamins-Minerals (MULTIVITAMIN ADULTS PO) Take 1 tablet by mouth Daily.   • potassium chloride (K-DUR) 10 MEQ CR tablet Take 10 mEq by mouth Daily.   • VITAMIN D, CHOLECALCIFEROL, PO Take 1 tablet by mouth Daily.     No current facility-administered medications on file prior to visit.          Past Medical History:   Diagnosis Date   • Allergic rhinitis    • Aphasia    • BMI 22.0-22.9, adult    • Burning with urination 12/7/2020   • Depression with anxiety    • Dyslipidemia    • Encounter for immunization    • Fatigue    • Functional murmur    • Gum hypertrophy    • High risk medication use    • History of acute sinusitis    • History of dizziness    • History of echocardiogram    • History of hyperparathyroidism    • History of kidney stones     Bilateral   • History of low back pain    • History of mitral valve repair     [employer] annuloplasty ring, complex MV repair with significant intra-op and post op complications   • History of nephrolithiasis    • History of stroke     LMCA; right hemiplegia   • History of transesophageal echocardiography (PRASHANT)    • Kidney stone     3mm    • Lactase deficiency    • Left nephrolithiasis 8/12/2019   • Mitral regurgitation    • Mitral valve prolapse    • Muscle hypertonicity    • Osteopenia    • Pain in right hand    • Pain, wrist joint    • Parathyroid adenoma    • Positive depression screening    • Primary hyperparathyroidism (CMS/HCC)    • Right arm pain    • Sinus tachycardia    • Spastic hemiplegia affecting dominant side (CMS/HCC)    • Vitamin D deficiency        Past Surgical History:   Procedure Laterality Date   • CYSTOSCOPY URETEROSCOPY LASER LITHOTRIPSY     • KNEE SURGERY Left    • MITRAL VALVE  REPAIR/REPLACEMENT  2015    annuloplasty ring, complex MV repair with significant intra-op and post-op complications   • OTHER SURGICAL HISTORY      Selective Arterial Catheterization    • PARATHYROID GLAND SURGERY  2014    Parathyroid resection       Family History   Problem Relation Age of Onset   • Coronary artery disease Mother    • Glaucoma Mother    • Nephrolithiasis Mother    • No Known Problems Father    • Colon cancer Maternal Grandmother         diagnosed in her 60's   • Breast cancer Maternal Grandmother    • Ovarian cancer Maternal Grandmother    • No Known Problems Other    • Coronary artery disease Maternal Grandfather    • Heart attack Maternal Grandfather          at age 54 years   • Colon cancer Paternal Grandfather    • Breast cancer Maternal Aunt         diagnosed in 40s   • Ovarian cancer Maternal Aunt    • Osteoporosis Maternal Aunt        Social History     Socioeconomic History   • Marital status:      Spouse name: Not on file   • Number of children: Not on file   • Years of education: Not on file   • Highest education level: Not on file   Tobacco Use   • Smoking status: Never Smoker   • Smokeless tobacco: Never Used   Substance and Sexual Activity   • Alcohol use: Yes     Comment: social drinker   • Drug use: No   • Sexual activity: Yes     Partners: Male     Birth control/protection: None       Review of Systems   Constitutional: Negative for appetite change, chills, fatigue, fever and unexpected weight gain. Weight loss: eats consistent meals, has been drinking more water.   HENT: Negative for ear pain and sore throat.    Eyes: Negative for blurred vision and pain.   Respiratory: Negative for cough, chest tightness and shortness of breath.    Cardiovascular: Negative for chest pain, palpitations and leg swelling.   Gastrointestinal: Negative for abdominal pain, blood in stool, constipation, diarrhea, GERD and indigestion.   Endocrine: Negative for cold intolerance, heat  "intolerance and polydipsia.   Genitourinary: Negative for dysuria and frequency.   Musculoskeletal: Negative for arthralgias and back pain.   Skin: Negative for rash.   Neurological: Negative for dizziness, syncope and headache.   Hematological: Does not bruise/bleed easily.   Psychiatric/Behavioral: Negative for depressed mood. The patient is not nervous/anxious.        Objective   Vitals:    03/22/21 0927   BP: 108/74   BP Location: Left arm   Patient Position: Sitting   Cuff Size: Adult   Pulse: 78   Temp: 97.7 °F (36.5 °C)   TempSrc: Infrared   SpO2: 99%   Weight: 63.5 kg (140 lb)   Height: 175.3 cm (69\")   PainSc: 0-No pain     Body mass index is 20.67 kg/m².    Physical Exam  Vitals and nursing note reviewed.   Constitutional:       General: She is not in acute distress.     Appearance: She is well-developed and well-groomed. She is not diaphoretic.   HENT:      Head: Normocephalic.      Right Ear: External ear normal.      Left Ear: External ear normal.   Eyes:      Extraocular Movements: Extraocular movements intact.      Conjunctiva/sclera: Conjunctivae normal.      Pupils: Pupils are equal, round, and reactive to light.   Neck:      Vascular: No carotid bruit.   Cardiovascular:      Rate and Rhythm: Normal rate and regular rhythm.      Pulses: Normal pulses.      Heart sounds: Normal heart sounds. No murmur heard.     Pulmonary:      Effort: Pulmonary effort is normal. No respiratory distress.      Breath sounds: Normal breath sounds.   Abdominal:      General: Bowel sounds are normal.      Palpations: Abdomen is soft. There is no hepatomegaly or splenomegaly.      Tenderness: There is no abdominal tenderness.   Musculoskeletal:      Right hand: Normal pulse.      Left hand: Bony tenderness: mild over right 1st finger palmar surface. Decreased range of motion: some decreased ROM of thumb and 1st finger. Decreased strength: minimal weakness of right hand grasp compared to left. Normal pulse.      Cervical " back: Normal range of motion and neck supple.      Right lower leg: No edema.      Left lower leg: No edema.      Right foot: Normal pulse.      Left foot: Normal pulse.   Skin:     General: Skin is warm and dry.      Findings: No rash.   Neurological:      Mental Status: She is alert and oriented to person, place, and time.      Motor: Weakness: mild weakness of RUE and RLE compared to left.      Gait: Abnormal gait: mild intoeing on right.      Comments: Right toes noted to curl down randomly every couple of minutes during exam   Psychiatric:         Mood and Affect: Mood normal.         Behavior: Behavior normal.         Thought Content: Thought content normal.         Cognition and Memory: Cognition normal.         Judgment: Judgment normal.         Lab Results   Component Value Date    WBC 5.26 12/11/2020    RBC 4.42 12/11/2020    HGB 12.9 12/11/2020    HCT 40.2 12/11/2020    MCV 91.0 12/11/2020    MCH 29.2 12/11/2020    MCHC 32.1 12/11/2020    RDW 12.3 12/11/2020     12/11/2020    NEUTRORELPCT 56.3 12/11/2020    LYMPHORELPCT 31.2 12/11/2020    MONORELPCT 9.5 12/11/2020    EOSRELPCT 1.7 12/11/2020    BASORELPCT 1.1 12/11/2020    NEUTROABS 2.96 12/11/2020    LYMPHSABS 1.64 12/11/2020    MONOSABS 0.50 12/11/2020    EOSABS 0.09 12/11/2020    BASOSABS 0.06 12/11/2020    NRBC 0.0 12/11/2020     Lab Results   Component Value Date    BUN 12 03/02/2021    CREATININE 0.85 03/02/2021    EGFRIFNONA 87 03/02/2021    EGFRIFAFRI 101 03/02/2021    BCR 14 03/02/2021    K 4.0 03/02/2021    CO2 25 03/02/2021    CALCIUM 9.8 03/02/2021    PROTENTOTREF 6.9 03/02/2021    ALBUMIN 4.5 03/02/2021    LABIL2 1.9 03/02/2021    AST 15 03/02/2021    ALT 16 03/02/2021      Lab Results   Component Value Date    CHLPL 147 03/02/2021    TRIG 170 (H) 03/02/2021    HDL 47 03/02/2021    VLDL 29 03/02/2021    LDL 71 03/02/2021     Lab Results   Component Value Date    TSH 3.220 07/28/2020         Assessment/Plan .  Problem List Items  Addressed This Visit     Spastic hemiplegia affecting dominant side (CMS/HCC) - Primary    Relevant Orders    Ambulatory Referral to Neurology    Muscle hypertonicity    Current Assessment & Plan     Continue Diclofenac twice daily.  Continue Baclofen at bedtime.         Relevant Orders    Ambulatory Referral to Neurology    History of stroke    Relevant Orders    Ambulatory Referral to Neurology    Unintentional weight loss    Current Assessment & Plan     Continue to monitor your weight.               Return if symptoms worsen or fail to improve.  Weight has been fairly stable; pt states she eats consistent meals; will continue to monitor; will recheck thyroid with next labs.     COVID-19 Precautions - Patient was compliant in wearing a mask. When I saw the patient, I used appropriate personal protective equipment (PPE) including mask, gloves, and eye shield (standard procedure).  Hand hygiene was completed before and after seeing the patient.

## 2021-04-16 ENCOUNTER — BULK ORDERING (OUTPATIENT)
Dept: CASE MANAGEMENT | Facility: OTHER | Age: 41
End: 2021-04-16

## 2021-04-16 DIAGNOSIS — Z23 IMMUNIZATION DUE: ICD-10-CM

## 2021-05-24 DIAGNOSIS — G81.10 SPASTIC HEMIPLEGIA AFFECTING DOMINANT SIDE (HCC): ICD-10-CM

## 2021-05-24 DIAGNOSIS — M79.641 PAIN IN RIGHT HAND: ICD-10-CM

## 2021-05-24 DIAGNOSIS — M62.89 MUSCLE HYPERTONICITY: ICD-10-CM

## 2021-05-24 RX ORDER — BACLOFEN 10 MG/1
TABLET ORAL
Qty: 90 TABLET | Refills: 1 | Status: SHIPPED | OUTPATIENT
Start: 2021-05-24 | End: 2021-06-10 | Stop reason: SDUPTHER

## 2021-06-10 ENCOUNTER — TELEPHONE (OUTPATIENT)
Dept: FAMILY MEDICINE CLINIC | Facility: CLINIC | Age: 41
End: 2021-06-10

## 2021-06-10 DIAGNOSIS — G81.10 SPASTIC HEMIPLEGIA AFFECTING DOMINANT SIDE (HCC): ICD-10-CM

## 2021-06-10 DIAGNOSIS — M79.641 PAIN IN RIGHT HAND: ICD-10-CM

## 2021-06-10 DIAGNOSIS — M62.89 MUSCLE HYPERTONICITY: ICD-10-CM

## 2021-06-10 RX ORDER — BACLOFEN 10 MG/1
TABLET ORAL
Qty: 180 TABLET | Refills: 0 | Status: SHIPPED | OUTPATIENT
Start: 2021-06-10 | End: 2021-11-30

## 2021-06-10 NOTE — TELEPHONE ENCOUNTER
Caller: PERCY SABILLON    Relationship: Emergency Contact    Best call back number: [phone]     Medication needed:   Requested Prescriptions     Pending Prescriptions Disp Refills   • baclofen (LIORESAL) 10 MG tablet 90 tablet 1     Sig: Take 1 tablet by mouth Daily.       When do you need the refill by: ASAP    What additional details did the patient provide when requesting the medication: SISTER SAYS THIS MEDICATION NEEDS TO BE TWICE  A DAY    Does the patient have less than a 3 day supply:  [x] Yes  [] No    What is the patient's preferred pharmacy: [facility] [address] [place] [id number] [facility] [phone] [facility] [phone]

## 2021-06-10 NOTE — TELEPHONE ENCOUNTER
Please call patient and let her know I sent the prescription to allow 1-2 tablets daily as needed.  Make sure patient is not getting too sleepy with taking 2 tablets of Baclofen.

## 2021-09-06 NOTE — PROGRESS NOTES
Answers for HPI/ROS submitted by the patient on 9/5/2021  What is the primary reason for your visit?: Physical    Too soon for physical today    Subjective   Ania Reyes is a 41 y.o. female.     Chief Complaint   Patient presents with   • Seizures     follow up       History of Present Illness   Patient presents for follow up S/P MVR: takes Metoprolol daily; has to change positions slowly to prevent orthostasis; does not typically monitor BP; no headaches; no swelling.    F/U dyslipidemia: takes Atorvastatin daily; no myalgias; pretty healthy diet; not fasting today.    F/U right hand pain/muscle hypertonicity: takes Baclofen daily and Diclofenac twice daily; has been taking Baclofen in early morning and again around 0900 to prevent hand drawing up around 0930 and has helped; also takes Gabapentin QID; sees pain management.    F/U seizures/spastic hemiplegia: had seisure on 4/1/21 at restaurant and went to [facility] ER; saw neurology Dr. Ortega on 5/5/21; had another seizure at home after OV; told juanire in location of previous stroke; called Dr. Ortega and was referred to epilepsy clinic; started on Keppra twice daily; had EEG last week; will be having another test on 9/10/21.    F/U weight loss: weight has improved; has been eating good; has been drinking more water; mother stays with her 3 days per week, father comes 1 day per week and sister takes care of her other days.     [facility]    Sister was present during the history-taking and subsequent discussion with this patient.  Patient agrees to the presence of the individual during this visit.      The following portions of the patient's history were reviewed and updated as appropriate: allergies, current medications, past family history, past medical history, past social history, past surgical history and problem list.    Current Outpatient Medications on File Prior to Visit   Medication Sig   • aspirin 81  MG tablet Take 81 mg by mouth Daily.   • atorvastatin (LIPITOR) 20 MG tablet Take 20 mg by mouth Daily.   • baclofen (LIORESAL) 10 MG tablet Take 1-2 tablets daily as needed for muscle spasms.   • diazePAM (DIASTAT ACUDIAL) 20 MG rectal kit INSERT 12.5MG INTO THE RECTUM ONCE FOR 1 DOSE FOR GENERALIZED SEIZURE LASTING OVER 3 MINUTES.   • diclofenac (VOLTAREN) 50 MG EC tablet TAKE 1 TABLET BY MOUTH TWICE DAILY AS NEEDED FOR  RIGHT  HAND  PAIN   • DULoxetine (CYMBALTA) 60 MG capsule Take 60 mg by mouth Daily.   • famotidine (PEPCID) 20 MG tablet Take 20 mg by mouth Daily.   • gabapentin (NEURONTIN) 600 MG tablet Take 600 mg by mouth 4 (Four) Times a Day.   • levETIRAcetam (KEPPRA) 500 MG tablet Take 500 mg by mouth 2 (Two) Times a Day.   • metoprolol succinate XL (TOPROL-XL) 25 MG 24 hr tablet Take 25 mg by mouth Daily.   • Multiple Vitamins-Minerals (MULTIVITAMIN ADULTS PO) Take 1 tablet by mouth Daily.   • potassium chloride (K-DUR) 10 MEQ CR tablet Take 10 mEq by mouth Daily.   • VITAMIN D, CHOLECALCIFEROL, PO Take 1 tablet by mouth Daily.     No current facility-administered medications on file prior to visit.        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) 5/11/2015    Description: 02- - (N) Coronaries - LVEF 55% - Severe prolapse of the anterior & posterior mitral leaflet with severe MR,   • Functional cardiac murmur 4/14/2014   • Gingival enlargement 11/17/2016   • Gum hypertrophy    • Heart failure (CMS/[facility], located [facility]) 8/13/2021    NYHA class 3 NYHA class 3 NYHA class 3 NYHA class 3   • High risk medication use    • History of acute sinusitis    • History of dizziness    • History of  echocardiogram    • History of hyperparathyroidism    • History of kidney stones     Bilateral   • History of low back pain    • History of mitral valve repair     [employer] annuloplasty ring, complex MV repair with significant intra-op and post op complications   • History of nephrolithiasis    • History of stroke     LMCA; right hemiplegia   • History of transesophageal echocardiography (PRASHANT)    • Kidney stone     3mm    • Lactase deficiency    • Left nephrolithiasis 8/12/2019   • Methicillin resistant Staphylococcus aureus infection 3/13/2015    No A2K system hx - now +MRSA sputum on 3/13/2015 No A2K system hx - now +MRSA sputum on 3/13/2015 No A2K system hx - now +MRSA sputum on 3/13/2015 No A2K system hx - now +MRSA sputum on 3/13/2015   • Mitral regurgitation    • Mitral valve prolapse    • Muscle hypertonicity    • Need for SBE (subacute bacterial endocarditis) prophylaxis 8/12/2019   • Osteopenia    • Pain in right hand    • Pain, wrist joint    • Parathyroid adenoma    • Positive depression screening    • Primary hyperparathyroidism (CMS/HCC)    • Right arm pain    • Right hemiplegia (CMS/HCC) 5/28/2015   • Seizures (CMS/HCC)    • Sinus tachycardia    • Spastic hemiplegia affecting dominant side (CMS/HCC)    • Vitamin D deficiency        Past Surgical History:   Procedure Laterality Date   • CARDIAC SURGERY  2015   • CYSTOSCOPY URETEROSCOPY LASER LITHOTRIPSY     • KNEE SURGERY Left    • MITRAL VALVE REPAIR/REPLACEMENT  03/2015    annuloplasty ring, complex MV repair with significant intra-op and post-op complications   • OTHER SURGICAL HISTORY      Selective Arterial Catheterization    • PARATHYROID GLAND SURGERY  2014    Parathyroid resection       Family History   Problem Relation Age of Onset   • Coronary artery disease Mother    • Glaucoma Mother    • Nephrolithiasis Mother    • No Known Problems Father    • Colon cancer Maternal Grandmother         diagnosed in her 60's   • Breast cancer  Maternal Grandmother    • Ovarian cancer Maternal Grandmother    • No Known Problems Other    • Coronary artery disease Maternal Grandfather    • Heart attack Maternal Grandfather          at age 54 years   • Colon cancer Paternal Grandfather    • Breast cancer Maternal Aunt         diagnosed in 40s   • Ovarian cancer Maternal Aunt    • Osteoporosis Maternal Aunt        Social History     Socioeconomic History   • Marital status:      Spouse name: Not on file   • Number of children: Not on file   • Years of education: Not on file   • Highest education level: Not on file   Tobacco Use   • Smoking status: Never Smoker   • Smokeless tobacco: Never Used   Substance and Sexual Activity   • Alcohol use: Yes     Alcohol/week: 1.0 standard drinks     Types: 1 Standard drinks or equivalent per week     Comment: social drinker   • Drug use: No   • Sexual activity: Yes     Partners: Male     Birth control/protection: None       Review of Systems   Constitutional: Negative for appetite change, chills, fatigue, fever, unexpected weight gain and unexpected weight loss.   HENT: Negative for ear pain, sinus pressure, sore throat and trouble swallowing.    Eyes: Negative for blurred vision and pain.   Respiratory: Negative for cough, chest tightness and shortness of breath.    Cardiovascular: Negative for chest pain and palpitations.   Gastrointestinal: Negative for abdominal pain, blood in stool, constipation, diarrhea, GERD and indigestion (takes Pepcid daily and works well).   Endocrine: Negative for polydipsia.   Genitourinary: Negative for dysuria and frequency.   Musculoskeletal: Arthralgias: see HPI. Back pain: none other than mild with small kidney stones; sees urology.   Skin: Negative for rash.   Neurological: Negative for syncope. Dizziness: some at times; see HPI.   Hematological: Does not bruise/bleed easily.   Psychiatric/Behavioral: Negative for sleep disturbance and depressed mood. The patient is not  "nervous/anxious.        Objective   Vitals:    09/07/21 1307 09/07/21 1354 09/07/21 1355   BP: 105/76 118/82 110/80   BP Location: Left arm Left arm Left arm   Patient Position: Sitting Sitting Standing   Cuff Size: Adult     Pulse: 76     Temp: 98.4 °F (36.9 °C)     TempSrc: Infrared     SpO2: 98%     Weight: 64.5 kg (142 lb 3.2 oz)     Height: 175.3 cm (69\")     PainSc: 0-No pain       Body mass index is 21 kg/m².    Physical Exam  Vitals and nursing note reviewed.   Constitutional:       General: She is not in acute distress.     Appearance: She is well-developed and well-groomed. She is not diaphoretic.   HENT:      Head: Normocephalic.      Right Ear: External ear normal.      Left Ear: External ear normal.   Eyes:      Conjunctiva/sclera: Conjunctivae normal.   Neck:      Vascular: No carotid bruit.   Cardiovascular:      Rate and Rhythm: Normal rate and regular rhythm.      Pulses: Normal pulses.      Heart sounds: Normal heart sounds.   Pulmonary:      Effort: Pulmonary effort is normal. No respiratory distress.      Breath sounds: Normal breath sounds.   Abdominal:      General: Bowel sounds are normal.      Palpations: Abdomen is soft. There is no hepatomegaly or splenomegaly.      Tenderness: There is no abdominal tenderness. There is no guarding.   Musculoskeletal:      Cervical back: Normal range of motion and neck supple.      Right lower leg: No edema.      Left lower leg: No edema.      Comments: Some decreased ROM of 1st and 2nd fingers   Skin:     General: Skin is warm and dry.      Findings: No rash.   Neurological:      Mental Status: She is alert and oriented to person, place, and time.      Motor: Weakness: right hand grasp slightly weaker than left.      Gait: Gait abnormal (hemiparetic gait with high steps on right).   Psychiatric:         Mood and Affect: Mood normal.         Behavior: Behavior normal.         Thought Content: Thought content normal.         Judgment: Judgment normal. "         Lab Results   Component Value Date    WBC 5.26 12/11/2020    RBC 4.42 12/11/2020    HGB 12.9 12/11/2020    HCT 40.2 12/11/2020    MCV 91.0 12/11/2020    MCH 29.2 12/11/2020    MCHC 32.1 12/11/2020    RDW 12.3 12/11/2020     12/11/2020    NEUTRORELPCT 56.3 12/11/2020    LYMPHORELPCT 31.2 12/11/2020    MONORELPCT 9.5 12/11/2020    EOSRELPCT 1.7 12/11/2020    BASORELPCT 1.1 12/11/2020    NEUTROABS 2.96 12/11/2020    LYMPHSABS 1.64 12/11/2020    MONOSABS 0.50 12/11/2020    EOSABS 0.09 12/11/2020    BASOSABS 0.06 12/11/2020    NRBC 0.0 12/11/2020     Lab Results   Component Value Date    BUN 12 03/02/2021    CREATININE 0.85 03/02/2021    EGFRIFNONA 87 03/02/2021    EGFRIFAFRI 101 03/02/2021    BCR 14 03/02/2021    K 4.0 03/02/2021    CO2 25 03/02/2021    CALCIUM 9.8 03/02/2021    PROTENTOTREF 6.9 03/02/2021    ALBUMIN 4.5 03/02/2021    LABIL2 1.9 03/02/2021    AST 15 03/02/2021    ALT 16 03/02/2021      Lab Results   Component Value Date    CHLPL 147 03/02/2021    TRIG 170 (H) 03/02/2021    HDL 47 03/02/2021    VLDL 29 03/02/2021    LDL 71 03/02/2021     Lab Results   Component Value Date    TSH 3.220 07/28/2020     Lab Results   Component Value Date    COLORU Ania 12/07/2020    CLARITYU Clear 12/07/2020    LEUKOCYTESUR Negative 12/07/2020    BILIRUBINUR Negative 12/07/2020        Assessment/Plan .  Problem List Items Addressed This Visit     Spastic hemiplegia affecting right dominant side (CMS/HCC)    Current Assessment & Plan     Continue Baclofen twice daily.  Follow up as scheduled with neurology.         Pain in right hand    Current Assessment & Plan     Continue Diclofenac twice daily.  Continue Gabapentin as per pain management.         History of stroke    Current Assessment & Plan     Follow up as scheduled with neurology.         History of repair of mitral valve    Overview     Description: [employer] annuloplasty ring, complex MV repair with significant intra-op and post-op  complications         Current Assessment & Plan     Continue Metoprolol daily.         Dyslipidemia - Primary    Current Assessment & Plan     Continue Atorvastatin daily.         Relevant Orders    Comprehensive Metabolic Panel    Lipid Panel With LDL / HDL Ratio    Vitamin D deficiency    Relevant Orders    Vitamin D 25 Hydroxy    History of parathyroid surgery    Relevant Orders    TSH Rfx On Abnormal To Free T4    Unintentional weight loss    Current Assessment & Plan     Improving.         Dizziness    Current Assessment & Plan     Make sure drinking plenty of liquids.  Monitor your blood pressure periodically and record results.  Check with cardiology if dizziness persists despite drinking plenty of liquids.         Seizure (CMS/[facility])    Overview     First seizure 4/1/21 and second June 2021         Current Assessment & Plan     Continue Keppra twice daily.  Follow up as scheduled with epileptic clinic.           Other Visit Diagnoses     Weakness         Relevant Orders    TSH Rfx On Abnormal To Free T4          Return in about 6 months (around 3/7/2022) for Medicare Wellness, Recheck.or sooner if symptoms persist or worsen.  Instructed to start monitoring BP at home; also suggested noting if has not had much to drink during times having dizziness.     COVID-19 Precautions - Patient was compliant in wearing a mask. When I saw the patient, I used appropriate personal protective equipment (PPE) including mask, gloves, and eye shield (standard procedure).  Hand hygiene was completed before and after seeing the patient.

## 2021-09-07 ENCOUNTER — OFFICE VISIT (OUTPATIENT)
Dept: FAMILY MEDICINE CLINIC | Facility: CLINIC | Age: 41
End: 2021-09-07

## 2021-09-07 VITALS
SYSTOLIC BLOOD PRESSURE: 110 MMHG | TEMPERATURE: 98.4 F | OXYGEN SATURATION: 98 % | HEIGHT: 69 IN | HEART RATE: 76 BPM | WEIGHT: 142.2 LBS | BODY MASS INDEX: 21.06 KG/M2 | DIASTOLIC BLOOD PRESSURE: 80 MMHG

## 2021-09-07 DIAGNOSIS — E78.5 DYSLIPIDEMIA: Primary | ICD-10-CM

## 2021-09-07 DIAGNOSIS — Z86.73 HISTORY OF STROKE: ICD-10-CM

## 2021-09-07 DIAGNOSIS — R63.4 UNINTENTIONAL WEIGHT LOSS: ICD-10-CM

## 2021-09-07 DIAGNOSIS — R42 DIZZINESS: ICD-10-CM

## 2021-09-07 DIAGNOSIS — Z98.890 HISTORY OF PARATHYROID SURGERY: ICD-10-CM

## 2021-09-07 DIAGNOSIS — Z98.890 HISTORY OF REPAIR OF MITRAL VALVE: ICD-10-CM

## 2021-09-07 DIAGNOSIS — M79.641 PAIN IN RIGHT HAND: ICD-10-CM

## 2021-09-07 DIAGNOSIS — E55.9 VITAMIN D DEFICIENCY: ICD-10-CM

## 2021-09-07 DIAGNOSIS — G81.10 SPASTIC HEMIPLEGIA AFFECTING DOMINANT SIDE (HCC): ICD-10-CM

## 2021-09-07 DIAGNOSIS — R56.9 SEIZURE (HCC): ICD-10-CM

## 2021-09-07 DIAGNOSIS — R53.1 WEAKNESS: ICD-10-CM

## 2021-09-07 PROBLEM — I50.9 HEART FAILURE (HCC): Status: ACTIVE | Noted: 2021-08-13

## 2021-09-07 PROBLEM — Z29.89 NEED FOR SBE (SUBACUTE BACTERIAL ENDOCARDITIS) PROPHYLAXIS: Status: RESOLVED | Noted: 2019-08-12 | Resolved: 2021-09-07

## 2021-09-07 PROBLEM — I50.9 HEART FAILURE: Status: RESOLVED | Noted: 2021-08-13 | Resolved: 2021-09-07

## 2021-09-07 PROBLEM — Z29.8 NEED FOR SBE (SUBACUTE BACTERIAL ENDOCARDITIS) PROPHYLAXIS: Status: RESOLVED | Noted: 2019-08-12 | Resolved: 2021-09-07

## 2021-09-07 PROCEDURE — 99214 OFFICE O/P EST MOD 30 MIN: CPT | Performed by: NURSE PRACTITIONER

## 2021-09-07 RX ORDER — LEVETIRACETAM 500 MG/1
500 TABLET ORAL 2 TIMES DAILY
COMMUNITY
Start: 2021-08-13 | End: 2022-12-02

## 2021-09-07 RX ORDER — DIAZEPAM 20 MG/4ML
GEL RECTAL
COMMUNITY
Start: 2021-08-13

## 2021-09-07 NOTE — PATIENT INSTRUCTIONS
Make sure drinking plenty of liquids.  Monitor your blood pressure periodically and record results.  Check with cardiology if dizziness persists despite drinking plenty of liquids.  Continue to work on healthy diet and exercise as tolerated.  Follow up pending lab results.  Follow up in 6 months for Medicare Wellness, or sooner if problems or concerns.  Follow up as scheduled with neurology/epilespy clinic.

## 2021-09-07 NOTE — ASSESSMENT & PLAN NOTE
Make sure drinking plenty of liquids.  Monitor your blood pressure periodically and record results.  Check with cardiology if dizziness persists despite drinking plenty of liquids.

## 2021-09-08 LAB
25(OH)D3+25(OH)D2 SERPL-MCNC: 52.2 NG/ML (ref 30–100)
ALBUMIN SERPL-MCNC: 4.1 G/DL (ref 3.8–4.8)
ALBUMIN/GLOB SERPL: 1.6 {RATIO} (ref 1.2–2.2)
ALP SERPL-CCNC: 96 IU/L (ref 48–121)
ALT SERPL-CCNC: 18 IU/L (ref 0–32)
AST SERPL-CCNC: 16 IU/L (ref 0–40)
BILIRUB SERPL-MCNC: 0.3 MG/DL (ref 0–1.2)
BUN SERPL-MCNC: 9 MG/DL (ref 6–20)
BUN/CREAT SERPL: 12 (ref 9–23)
CALCIUM SERPL-MCNC: 9.4 MG/DL (ref 8.7–10.2)
CHLORIDE SERPL-SCNC: 106 MMOL/L (ref 96–106)
CHOLEST SERPL-MCNC: 169 MG/DL (ref 100–199)
CO2 SERPL-SCNC: 27 MMOL/L (ref 20–29)
CREAT SERPL-MCNC: 0.73 MG/DL (ref 0.57–1)
GLOBULIN SER CALC-MCNC: 2.5 G/DL (ref 1.5–4.5)
GLUCOSE SERPL-MCNC: 87 MG/DL (ref 65–99)
HDLC SERPL-MCNC: 50 MG/DL
LDLC SERPL CALC-MCNC: 90 MG/DL (ref 0–99)
LDLC/HDLC SERPL: 1.8 RATIO (ref 0–3.2)
POTASSIUM SERPL-SCNC: 4.1 MMOL/L (ref 3.5–5.2)
PROT SERPL-MCNC: 6.6 G/DL (ref 6–8.5)
SODIUM SERPL-SCNC: 145 MMOL/L (ref 134–144)
TRIGL SERPL-MCNC: 170 MG/DL (ref 0–149)
TSH SERPL DL<=0.005 MIU/L-ACNC: 2.37 UIU/ML (ref 0.45–4.5)
VLDLC SERPL CALC-MCNC: 29 MG/DL (ref 5–40)

## 2021-11-05 ENCOUNTER — OFFICE VISIT (OUTPATIENT)
Dept: FAMILY MEDICINE CLINIC | Facility: CLINIC | Age: 41
End: 2021-11-05

## 2021-11-05 VITALS
HEIGHT: 69 IN | BODY MASS INDEX: 20.91 KG/M2 | WEIGHT: 141.2 LBS | DIASTOLIC BLOOD PRESSURE: 68 MMHG | OXYGEN SATURATION: 100 % | SYSTOLIC BLOOD PRESSURE: 102 MMHG | HEART RATE: 97 BPM | TEMPERATURE: 98.2 F

## 2021-11-05 DIAGNOSIS — R42 DIZZINESS: ICD-10-CM

## 2021-11-05 DIAGNOSIS — M79.641 PAIN IN RIGHT HAND: ICD-10-CM

## 2021-11-05 DIAGNOSIS — R10.9 LEFT FLANK PAIN: Primary | ICD-10-CM

## 2021-11-05 DIAGNOSIS — Z23 NEED FOR IMMUNIZATION AGAINST INFLUENZA: ICD-10-CM

## 2021-11-05 PROBLEM — I63.412 CEREBRAL INFARCTION DUE TO EMBOLISM OF LEFT MIDDLE CEREBRAL ARTERY: Status: ACTIVE | Noted: 2021-09-16

## 2021-11-05 PROBLEM — I63.412 CEREBRAL INFARCTION DUE TO EMBOLISM OF LEFT MIDDLE CEREBRAL ARTERY: Status: RESOLVED | Noted: 2021-09-16 | Resolved: 2021-11-05

## 2021-11-05 PROCEDURE — G0008 ADMIN INFLUENZA VIRUS VAC: HCPCS | Performed by: NURSE PRACTITIONER

## 2021-11-05 PROCEDURE — 99214 OFFICE O/P EST MOD 30 MIN: CPT | Performed by: NURSE PRACTITIONER

## 2021-11-05 PROCEDURE — 90686 IIV4 VACC NO PRSV 0.5 ML IM: CPT | Performed by: NURSE PRACTITIONER

## 2021-11-05 RX ORDER — OXYCODONE AND ACETAMINOPHEN 10; 325 MG/1; MG/1
TABLET ORAL
COMMUNITY
Start: 2021-10-21 | End: 2021-11-09

## 2021-11-05 RX ORDER — HYDROCODONE BITARTRATE AND ACETAMINOPHEN 5; 325 MG/1; MG/1
TABLET ORAL
COMMUNITY
End: 2021-11-09

## 2021-11-05 RX ORDER — ONDANSETRON 4 MG/1
TABLET, ORALLY DISINTEGRATING ORAL
COMMUNITY
End: 2022-01-27

## 2021-11-05 NOTE — PATIENT INSTRUCTIONS
Increase intake of clear liquids and rest.  Change positions slowly.  Try ice/heat to lower back, whichever feels better.  Follow up pending lab/test results.  Follow up if symptoms persist or worsen.  Schedule a follow up appointment with cardiology.

## 2021-11-05 NOTE — PROGRESS NOTES
Subjective   Ania Reyes is a 41 y.o. female.     Chief Complaint   Patient presents with   • Back Pain     lower- 2wks-   had kidney stones broke up but still having pain       History of Present Illness   Patient presents with c/o lower back pain; has had kidney stones several times in last year; had lithotripsy again about 1 month ago, but still having back pain; pt had started with back pain again and saw Dr. Wilcox; noted more kidney stones; had lithotripsy last month in [place]; had follow up with urology on 11/1/21 and told no kidney stones per x-ray; has pain across bilateral lower back; has not felt good for about 2 weeks; has had some diarrhea a few times in last couple of weeks; no fever, has had some chills; no radiation of pain down legs; no numbness or tingling; no bladder or bowel dysfunction; no dysuria or urinary frequency; had neg UA at urology; has not tried ice/heat; takes Diclofenac twice daily and Baclofen twice daily for hand pain and spasticity since stroke; takes Baclofen at 0500 and 1000 and helps hand; back pain is constant, nothing seems to make better or worse; no known injury.    Had another saw Dr. Wilcox and noted more kidney stones; had lithotripsy again about 1 month ago at  in [place]      F/U spastic hemiplegia affecting right dominant side:will be starting Botox injections in hand and foot on 11/10/21.    Mother was present during the history-taking and subsequent discussion with this patient.  Patient agrees to the presence of the individual during this visit.      The following portions of the patient's history were reviewed and updated as appropriate: allergies, current medications, past family history, past medical history, past social history, past surgical history and problem list.    Current Outpatient Medications on File Prior to Visit   Medication Sig   • aspirin 81 MG tablet Take 81 mg by mouth Daily.   • atorvastatin (LIPITOR) 20 MG tablet Take 20 mg by mouth Daily.   •  baclofen (LIORESAL) 10 MG tablet Take 1-2 tablets daily as needed for muscle spasms.   • diazePAM (DIASTAT ACUDIAL) 20 MG rectal kit INSERT 12.5MG INTO THE RECTUM ONCE FOR 1 DOSE FOR GENERALIZED SEIZURE LASTING OVER 3 MINUTES.   • diclofenac (VOLTAREN) 50 MG EC tablet TAKE 1 TABLET BY MOUTH TWICE DAILY AS NEEDED FOR  RIGHT  HAND  PAIN   • DULoxetine (CYMBALTA) 60 MG capsule Take 60 mg by mouth Daily.   • famotidine (PEPCID) 20 MG tablet Take 20 mg by mouth Daily.   • gabapentin (NEURONTIN) 600 MG tablet Take 600 mg by mouth 4 (Four) Times a Day.   • HYDROcodone-acetaminophen (NORCO) 5-325 MG per tablet hydrocodone 5 mg-acetaminophen 325 mg tablet   • levETIRAcetam (KEPPRA) 500 MG tablet Take 500 mg by mouth 2 (Two) Times a Day.   • metoprolol succinate XL (TOPROL-XL) 25 MG 24 hr tablet Take 25 mg by mouth Daily.   • Multiple Vitamins-Minerals (MULTIVITAMIN ADULTS PO) Take 1 tablet by mouth Daily.   • potassium chloride (K-DUR) 10 MEQ CR tablet Take 10 mEq by mouth Daily.   • VITAMIN D, CHOLECALCIFEROL, PO Take 1 tablet by mouth Daily.   • ondansetron ODT (ZOFRAN-ODT) 4 MG disintegrating tablet ondansetron 4 mg disintegrating tablet   • oxyCODONE-acetaminophen (PERCOCET)  MG per tablet      No current facility-administered medications on file prior to visit.        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    Description: 02- - (N) Coronaries - LVEF 55% - Severe prolapse of the anterior & posterior mitral leaflet with severe MR,   • Functional cardiac murmur 4/14/2014   • Gingival enlargement 11/17/2016    • Gum hypertrophy    • Heart failure (HCC) 8/13/2021    NYHA class 3 NYHA class 3 NYHA class 3 NYHA class 3   • High risk medication use    • History of acute sinusitis    • History of dizziness    • History of echocardiogram    • History of hyperparathyroidism    • History of kidney stones     Bilateral   • History of low back pain    • History of mitral valve repair     [employer] annuloplasty ring, complex MV repair with significant intra-op and post op complications   • History of nephrolithiasis    • History of stroke     LMCA; right hemiplegia   • History of transesophageal echocardiography ([facility])    • Kidney stone     3mm    • Lactase deficiency    • Left nephrolithiasis 8/12/2019   • Methicillin resistant Staphylococcus aureus infection 3/13/2015    No A2K system hx - now +MRSA sputum on 3/13/2015 No A2K system hx - now +MRSA sputum on 3/13/2015 No A2K system hx - now +MRSA sputum on 3/13/2015 No A2K system hx - now +MRSA sputum on 3/13/2015   • Mitral regurgitation    • Mitral valve prolapse    • Muscle hypertonicity    • Need for SBE (subacute bacterial endocarditis) prophylaxis 8/12/2019   • Osteopenia    • Pain in right hand    • Pain, wrist joint    • Parathyroid adenoma    • Positive depression screening    • Primary hyperparathyroidism ([facility])    • Right arm pain    • Right hemiplegia ([facility]) 5/28/2015   • Seizures ([facility])    • Sinus tachycardia    • Spastic hemiplegia affecting dominant side ([facility])    • Vitamin D deficiency        Past Surgical History:   Procedure Laterality Date   • CARDIAC SURGERY  2015   • CYSTOSCOPY URETEROSCOPY LASER LITHOTRIPSY     • KNEE SURGERY Left    • MITRAL VALVE REPAIR/REPLACEMENT  03/2015    annuloplasty ring, complex MV repair with significant intra-op and post-op complications   • OTHER SURGICAL HISTORY      Selective Arterial Catheterization    • PARATHYROID GLAND SURGERY  2014    Parathyroid resection       Family History   Problem Relation Age of Onset   •  Coronary artery disease Mother    • Glaucoma Mother    • Nephrolithiasis Mother    • No Known Problems Father    • Colon cancer Maternal Grandmother         diagnosed in her 60's   • Breast cancer Maternal Grandmother    • Ovarian cancer Maternal Grandmother    • No Known Problems Other    • Coronary artery disease Maternal Grandfather    • Heart attack Maternal Grandfather          at age 54 years   • Colon cancer Paternal Grandfather    • Breast cancer Maternal Aunt         diagnosed in 40s   • Ovarian cancer Maternal Aunt    • Osteoporosis Maternal Aunt        Social History     Socioeconomic History   • Marital status:    Tobacco Use   • Smoking status: Never Smoker   • Smokeless tobacco: Never Used   Substance and Sexual Activity   • Alcohol use: Yes     Alcohol/week: 1.0 standard drink     Types: 1 Standard drinks or equivalent per week     Comment: social drinker   • Drug use: No   • Sexual activity: Yes     Partners: Male     Birth control/protection: None       Review of Systems   Constitutional: Positive for fatigue (see HPI). Negative for appetite change, fever, unexpected weight gain and unexpected weight loss. Chills: see HPI.   HENT: Negative for ear pain, postnasal drip, sore throat and trouble swallowing.    Respiratory: Negative for cough, chest tightness and shortness of breath.    Cardiovascular: Negative for chest pain, palpitations and leg swelling.   Gastrointestinal: Negative for abdominal pain, blood in stool, constipation, GERD and indigestion. Diarrhea: see HPI.   Endocrine: Negative for polydipsia.   Genitourinary: Negative for difficulty urinating.   Musculoskeletal: Arthralgias: right hand, no change.   Skin: Negative for rash.   Neurological: Negative for dizziness, syncope and headache. Light-headedness: some with position changes, sometimes when just walking; has not been drinking much water/liquids.   Hematological: Does not bruise/bleed easily.       Objective  "  Vitals:    11/05/21 1028   BP: 102/68   Pulse: 97   Temp: 98.2 °F (36.8 °C)   TempSrc: Infrared   SpO2: 100%   Weight: 64 kg (141 lb 3.2 oz)   Height: 175.3 cm (69\")   PainSc:   6   PainLoc: Back     Body mass index is 20.85 kg/m².    Physical Exam  Vitals and nursing note reviewed.   Constitutional:       General: She is not in acute distress.     Appearance: She is well-developed and well-groomed. She is not ill-appearing or diaphoretic.   HENT:      Head: Normocephalic.      Right Ear: External ear normal.      Left Ear: External ear normal.   Eyes:      Conjunctiva/sclera: Conjunctivae normal.   Neck:      Vascular: No carotid bruit.   Cardiovascular:      Rate and Rhythm: Normal rate and regular rhythm.      Pulses: Normal pulses.      Heart sounds: Normal heart sounds. No murmur heard.      Pulmonary:      Effort: Pulmonary effort is normal. No respiratory distress.      Breath sounds: Normal breath sounds.   Abdominal:      General: Bowel sounds are normal.      Palpations: Abdomen is soft. There is no hepatomegaly or splenomegaly.      Tenderness: There is no abdominal tenderness. There is no guarding.   Musculoskeletal:      Cervical back: Normal range of motion and neck supple. No bony tenderness.      Thoracic back: No bony tenderness.      Lumbar back: Tenderness (to left and right of lumbar spine, left greater than right) present. Negative right straight leg raise test and negative left straight leg raise test.        Back:       Right hip: Normal range of motion.      Left hip: Normal range of motion.      Right lower leg: No edema.      Left lower leg: No edema.   Skin:     General: Skin is warm and dry.      Findings: No rash.   Neurological:      Mental Status: She is alert and oriented to person, place, and time.      Gait: Abnormal gait: hemiparetic gait with high steps on right.      Deep Tendon Reflexes:      Reflex Scores:       Patellar reflexes are 2+ on the right side and 2+ on the left " side.     Comments: Right hand grasp slightly weaker than left     Psychiatric:         Mood and Affect: Mood normal.         Behavior: Behavior normal.         Thought Content: Thought content normal.         Judgment: Judgment normal.         Lab Results   Component Value Date    WBC 5.26 12/11/2020    RBC 4.42 12/11/2020    HGB 12.9 12/11/2020    HCT 40.2 12/11/2020    MCV 91.0 12/11/2020    MCH 29.2 12/11/2020    MCHC 32.1 12/11/2020    RDW 12.3 12/11/2020     12/11/2020    NEUTRORELPCT 56.3 12/11/2020    LYMPHORELPCT 31.2 12/11/2020    MONORELPCT 9.5 12/11/2020    EOSRELPCT 1.7 12/11/2020    BASORELPCT 1.1 12/11/2020    NEUTROABS 2.96 12/11/2020    LYMPHSABS 1.64 12/11/2020    MONOSABS 0.50 12/11/2020    EOSABS 0.09 12/11/2020    BASOSABS 0.06 12/11/2020    NRBC 0.0 12/11/2020     Lab Results   Component Value Date    GLUCOSE 87 09/07/2021    BUN 9 09/07/2021    CREATININE 0.73 09/07/2021    EGFRIFNONA 104 09/07/2021    EGFRIFAFRI 120 09/07/2021    BCR 12 09/07/2021    K 4.1 09/07/2021    CO2 27 09/07/2021    CALCIUM 9.4 09/07/2021    PROTENTOTREF 6.6 09/07/2021    ALBUMIN 4.1 09/07/2021    LABIL2 1.6 09/07/2021    AST 16 09/07/2021    ALT 18 09/07/2021      Lab Results   Component Value Date    CHLPL 169 09/07/2021    TRIG 170 (H) 09/07/2021    HDL 50 09/07/2021    VLDL 29 09/07/2021    LDL 90 09/07/2021     Lab Results   Component Value Date    TSH 2.370 09/07/2021       Lab Results   Component Value Date    COLORU Ania 12/07/2020    CLARITYU Clear 12/07/2020    LEUKOCYTESUR Negative 12/07/2020    BILIRUBINUR Negative 12/07/2020 11/1/21 renal shadows are partially obscured by overlying bowel gas; questionable punctate calcifications project at the lower pole kidneys; scattered gas and stool at the large bowel with mild to moderate stool burden; no acute bony abnormality; no mass effect.    Assessment/Plan .  Problem List Items Addressed This Visit     Pain in right hand    Current Assessment &  Plan     Continue Baclofen and Diclofenac.         Left flank pain - Primary    Current Assessment & Plan     Try ice/heat to lower back, whichever feels better.         Relevant Orders    CBC & Differential    Comprehensive Metabolic Panel    CT Abdomen Pelvis Without Contrast    Dizziness    Current Assessment & Plan     Increase intake of clear liquids and rest.  Change positions slowly.           Other Visit Diagnoses     Need for immunization against influenza        Relevant Orders    FluLaval/Fluarix/Fluzone >6 Months (2163-3128) (Completed)          Return if symptoms worsen or fail to improve.  Patient had recent lithotripsy and back pain persists; will get CT for further evaluation; will request previous records from Dr. Wilcox, urology.     COVID-19 Precautions - Patient was compliant in wearing a mask. When I saw the patient, I used appropriate personal protective equipment (PPE) including mask, gloves, and eye shield (standard procedure).  Hand hygiene was completed before and after seeing the patient.

## 2021-11-06 LAB
ALBUMIN SERPL-MCNC: 4.1 G/DL (ref 3.8–4.8)
ALBUMIN/GLOB SERPL: 2.1 {RATIO} (ref 1.2–2.2)
ALP SERPL-CCNC: 96 IU/L (ref 44–121)
ALT SERPL-CCNC: 20 IU/L (ref 0–32)
AST SERPL-CCNC: 17 IU/L (ref 0–40)
BASOPHILS # BLD AUTO: 0.1 X10E3/UL (ref 0–0.2)
BASOPHILS NFR BLD AUTO: 1 %
BILIRUB SERPL-MCNC: 0.3 MG/DL (ref 0–1.2)
BUN SERPL-MCNC: 13 MG/DL (ref 6–20)
BUN/CREAT SERPL: 18 (ref 9–23)
CALCIUM SERPL-MCNC: 9.4 MG/DL (ref 8.7–10.2)
CHLORIDE SERPL-SCNC: 105 MMOL/L (ref 96–106)
CO2 SERPL-SCNC: 26 MMOL/L (ref 20–29)
CREAT SERPL-MCNC: 0.72 MG/DL (ref 0.57–1)
EOSINOPHIL # BLD AUTO: 0.1 X10E3/UL (ref 0–0.4)
EOSINOPHIL NFR BLD AUTO: 2 %
ERYTHROCYTE [DISTWIDTH] IN BLOOD BY AUTOMATED COUNT: 12.1 % (ref 11.7–15.4)
GLOBULIN SER CALC-MCNC: 2 G/DL (ref 1.5–4.5)
GLUCOSE SERPL-MCNC: 64 MG/DL (ref 65–99)
HCT VFR BLD AUTO: 40.1 % (ref 34–46.6)
HGB BLD-MCNC: 13.1 G/DL (ref 11.1–15.9)
IMM GRANULOCYTES # BLD AUTO: 0 X10E3/UL (ref 0–0.1)
IMM GRANULOCYTES NFR BLD AUTO: 0 %
LYMPHOCYTES # BLD AUTO: 1.4 X10E3/UL (ref 0.7–3.1)
LYMPHOCYTES NFR BLD AUTO: 29 %
MCH RBC QN AUTO: 28.9 PG (ref 26.6–33)
MCHC RBC AUTO-ENTMCNC: 32.7 G/DL (ref 31.5–35.7)
MCV RBC AUTO: 88 FL (ref 79–97)
MONOCYTES # BLD AUTO: 0.4 X10E3/UL (ref 0.1–0.9)
MONOCYTES NFR BLD AUTO: 8 %
NEUTROPHILS # BLD AUTO: 2.9 X10E3/UL (ref 1.4–7)
NEUTROPHILS NFR BLD AUTO: 60 %
PLATELET # BLD AUTO: 209 X10E3/UL (ref 150–450)
POTASSIUM SERPL-SCNC: 3.6 MMOL/L (ref 3.5–5.2)
PROT SERPL-MCNC: 6.1 G/DL (ref 6–8.5)
RBC # BLD AUTO: 4.54 X10E6/UL (ref 3.77–5.28)
SODIUM SERPL-SCNC: 144 MMOL/L (ref 134–144)
WBC # BLD AUTO: 4.8 X10E3/UL (ref 3.4–10.8)

## 2021-11-16 ENCOUNTER — HOSPITAL ENCOUNTER (OUTPATIENT)
Dept: CT IMAGING | Facility: HOSPITAL | Age: 41
Discharge: HOME | End: 2021-11-16
Admitting: NURSE PRACTITIONER

## 2021-11-16 DIAGNOSIS — R10.9 LEFT FLANK PAIN: ICD-10-CM

## 2021-11-16 PROCEDURE — 74176 CT ABD & PELVIS W/O CONTRAST: CPT

## 2021-11-28 DIAGNOSIS — G81.10 SPASTIC HEMIPLEGIA AFFECTING DOMINANT SIDE (HCC): ICD-10-CM

## 2021-11-28 DIAGNOSIS — M62.89 MUSCLE HYPERTONICITY: ICD-10-CM

## 2021-11-28 DIAGNOSIS — M79.641 PAIN IN RIGHT HAND: ICD-10-CM

## 2021-11-30 DIAGNOSIS — M79.641 PAIN IN RIGHT HAND: ICD-10-CM

## 2021-11-30 DIAGNOSIS — G81.10 SPASTIC HEMIPLEGIA AFFECTING DOMINANT SIDE (HCC): ICD-10-CM

## 2021-11-30 DIAGNOSIS — M62.89 MUSCLE HYPERTONICITY: ICD-10-CM

## 2021-11-30 RX ORDER — BACLOFEN 10 MG/1
TABLET ORAL
Qty: 90 TABLET | Refills: 1 | Status: SHIPPED | OUTPATIENT
Start: 2021-11-30 | End: 2021-11-30 | Stop reason: SDUPTHER

## 2021-11-30 RX ORDER — BACLOFEN 10 MG/1
10 TABLET ORAL 2 TIMES DAILY PRN
Qty: 180 TABLET | Refills: 1 | Status: SHIPPED | OUTPATIENT
Start: 2021-11-30 | End: 2022-05-31 | Stop reason: SDUPTHER

## 2022-01-27 DIAGNOSIS — G81.10 SPASTIC HEMIPLEGIA AFFECTING DOMINANT SIDE: ICD-10-CM

## 2022-01-27 DIAGNOSIS — M79.641 PAIN IN RIGHT HAND: ICD-10-CM

## 2022-01-27 DIAGNOSIS — M62.89 MUSCLE HYPERTONICITY: ICD-10-CM

## 2022-01-27 NOTE — TELEPHONE ENCOUNTER
Caller: PERCY SABILLON    Relationship: Emergency Contact    Best call back number: [phone]    Requested Prescriptions:   Requested Prescriptions     Pending Prescriptions Disp Refills   • diclofenac (VOLTAREN) 50 MG EC tablet 180 tablet 1        Pharmacy where request should be sent:  [facility] [address] [place] [id number] [facility] [phone] FX        Additional details provided by patient: PATIENT IS OUT OF THIS MEDICATION. PLEASE ADJUST THE DOSAGE TO REFLECT THE ACCURATE INSTRUCTIONS. PATIENT IS SUPPOSED TO BE TAKING 3 TABLETS DAILY.     Does the patient have less than a 3 day supply:  [x] Yes  [] No    Ray Lyon Rep   01/27/22 15:17 EST

## 2022-01-27 NOTE — TELEPHONE ENCOUNTER
Spoke with patient's sister (on [employer]) over the phone; pt has still been having discomfort in left lower back; taking Diclofenac TID helps, but does not resolve; will send refill of Diclofenac; instructed to schedule a follow up appointment for further evaluation; pt had normal CT scan; will consider physical therapy.

## 2022-03-08 ENCOUNTER — OFFICE VISIT (OUTPATIENT)
Dept: FAMILY MEDICINE CLINIC | Facility: CLINIC | Age: 42
End: 2022-03-08

## 2022-03-08 VITALS
HEIGHT: 69 IN | HEART RATE: 78 BPM | SYSTOLIC BLOOD PRESSURE: 96 MMHG | TEMPERATURE: 97.8 F | DIASTOLIC BLOOD PRESSURE: 70 MMHG | BODY MASS INDEX: 19.91 KG/M2 | OXYGEN SATURATION: 100 % | WEIGHT: 134.4 LBS

## 2022-03-08 DIAGNOSIS — J30.9 ALLERGIC RHINITIS, UNSPECIFIED SEASONALITY, UNSPECIFIED TRIGGER: ICD-10-CM

## 2022-03-08 DIAGNOSIS — F33.1 MODERATE EPISODE OF RECURRENT MAJOR DEPRESSIVE DISORDER: ICD-10-CM

## 2022-03-08 DIAGNOSIS — R63.4 UNINTENTIONAL WEIGHT LOSS: ICD-10-CM

## 2022-03-08 DIAGNOSIS — M79.641 PAIN IN RIGHT HAND: ICD-10-CM

## 2022-03-08 DIAGNOSIS — Z98.890 HISTORY OF REPAIR OF MITRAL VALVE: ICD-10-CM

## 2022-03-08 DIAGNOSIS — F41.9 ANXIETY: ICD-10-CM

## 2022-03-08 DIAGNOSIS — Z00.00 ENCOUNTER FOR MEDICARE ANNUAL WELLNESS EXAM: Primary | ICD-10-CM

## 2022-03-08 DIAGNOSIS — G81.11 SPASTIC HEMIPLEGIA AFFECTING RIGHT DOMINANT SIDE, UNSPECIFIED ETIOLOGY: ICD-10-CM

## 2022-03-08 DIAGNOSIS — R56.9 SEIZURE: ICD-10-CM

## 2022-03-08 DIAGNOSIS — R42 DIZZINESS: ICD-10-CM

## 2022-03-08 DIAGNOSIS — E78.5 DYSLIPIDEMIA: ICD-10-CM

## 2022-03-08 DIAGNOSIS — R06.89 DECREASED BREATH SOUNDS AT RIGHT LUNG BASE: ICD-10-CM

## 2022-03-08 PROCEDURE — 1170F FXNL STATUS ASSESSED: CPT | Performed by: NURSE PRACTITIONER

## 2022-03-08 PROCEDURE — 1159F MED LIST DOCD IN RCRD: CPT | Performed by: NURSE PRACTITIONER

## 2022-03-08 PROCEDURE — G0439 PPPS, SUBSEQ VISIT: HCPCS | Performed by: NURSE PRACTITIONER

## 2022-03-08 PROCEDURE — 99213 OFFICE O/P EST LOW 20 MIN: CPT | Performed by: NURSE PRACTITIONER

## 2022-03-08 NOTE — PATIENT INSTRUCTIONS
Increase intake of clear liquids.  Try over the counter antihistamine, such as Claritin or Allegra daily.  Continue to work on healthy diet and exercise as tolerated.  Follow up pending lab results.  Follow up in 1 month, or sooner if problems or concerns.       Medicare Wellness  Personal Prevention Plan of Service     Date of Office Visit:    Encounter Provider:  YONG Garcia  Place of Service:  [facility]  Patient Name: Ania Reyes  :  1980    As part of the Medicare Wellness portion of your visit today, we are providing you with this personalized preventive plan of services (PPPS). This plan is based upon recommendations of the [place] Preventive Services Task Force (USPSTF) and the Advisory Committee on Immunization Practices (ACIP).    This lists the preventive care services that should be considered, and provides dates of when you are due. Items listed as completed are up-to-date and do not require any further intervention.    Health Maintenance   Topic Date Due    DXA SCAN  2022    ANNUAL WELLNESS VISIT  2022    LIPID PANEL  2022    PAP SMEAR  2023    TDAP/TD VACCINES (3 - Td or Tdap) 2030    HEPATITIS C SCREENING  Completed    COVID-19 Vaccine  Completed    INFLUENZA VACCINE  Completed    Pneumococcal Vaccine 0-64  Aged Out       Orders Placed This Encounter   Procedures    XR Chest PA & Lateral     Standing Status:   Future     Standing Expiration Date:   3/8/2023     Order Specific Question:   Reason for Exam:     Answer:   decreased breath sounds right lower lobe     Order Specific Question:   Patient Pregnant     Answer:   No     Order Specific Question:   Does this patient have a diabetic monitoring/medication delivering device on?     Answer:   No     Order Specific Question:   Release to patient     Answer:   Immediate    Comprehensive Metabolic Panel     Order Specific Question:   Release to patient     Answer:    Immediate    Lipid Panel With LDL / HDL Ratio     Order Specific Question:   Release to patient     Answer:   Immediate    Ambulatory Referral to Psychiatry     Referral Priority:   Routine     Referral Type:   Behavorial Health/Psych     Referral Reason:   Specialty Services Required     Requested Specialty:   Psychiatry     Number of Visits Requested:   1    CBC & Differential     Order Specific Question:   Manual Differential     Answer:   No       Return in about 1 month (around 4/8/2022) for Recheck.

## 2022-03-08 NOTE — PROGRESS NOTES
Cathy Preston of the Annual Wellness Visit  Subsequent Medicare Wellness Visit    Chief Complaint   Patient presents with   • Medicare Wellness-subsequent      Subjective    History of Present Illness:  Ania Reyes is a 41 y.o. female who presents for a Subsequent Medicare Wellness Visit.  In addition, we addressed the following health issues:    FU S/P MVR: takes Metoprolol daily; does not typically monitor BP; has orthostasis, seems to be more frequent; has to change positions slowly to prevent orthostasis; does not drink much liquids; no headaches; no swelling; no exercise; sees Dr. Mccurdy cardiology, last follow up 6/2021, has follow up in December.     F/U dyslipidemia: takes Atorvastatin daily; no myalgias; does not really watch diet; fasting today.     F/U right hand pain/muscle hypertonicity: takes Baclofen twice daily and Diclofenac twice daily; has been taking Baclofen in early morning and again around 1000 to prevent hand drawing up around 0930 and has helped; also takes Gabapentin QID; sees pain management; gets Botox injections in right hand and foot per Dr. Trammell at [facility] and has helped.    F/U lower back pain: has improved.     F/U seizures/spastic hemiplegia/history of stroke: takes Keppra twice daily; sees neurology Dr. Ortega and goes to Epilepsy clinic; no recent seizure; no seizure since has been on medication.     F/U weight loss: weight has decreased again; dog recently passed last week due to cancer and went 3-4 days without eating; eats better when mother or sister come stay with her; see PHQ-9 from today; takes Duloxetine daily for nerve pain.       Mother was present during the history-taking and subsequent discussion with this patient.  Patient agrees to the presence of the individual during this visit.      The following portions of the patient's history were reviewed and   updated as appropriate: allergies, current medications, past family history, past medical history, past  social history, past surgical history and problem list.    Compared to one year ago, the patient feels her physical   health is better.    Compared to one year ago, the patient feels her mental   health is worse.see HPI    Recent Hospitalizations:  She was not admitted to the hospital during the last year.       Current Medical Providers:  Patient Care Team:  Cinthia Mark APRN as PCP - General (Family Medicine)  Betty Mccurdy MD as Consulting Physician (Cardiology)  Amanda Escobar (Nurse Practitioner)  Todd Davis MD as Consulting Physician (Urology)  Kellie Peterson APRN as Nurse Practitioner (Nurse Practitioner)  Renetta Ortega MD as Consulting Physician (Neurology)  Markie Trammell MD as Consulting Physician (Physical Medicine and Rehabilitation)    Outpatient Medications Prior to Visit   Medication Sig Dispense Refill   • aspirin 81 MG tablet Take 81 mg by mouth Daily.     • atorvastatin (LIPITOR) 20 MG tablet Take 20 mg by mouth Daily.     • baclofen (LIORESAL) 10 MG tablet Take 1 tablet by mouth 2 (Two) Times a Day As Needed for Muscle Spasms. 180 tablet 1   • diazePAM (DIASTAT ACUDIAL) 20 MG rectal kit INSERT 12.5MG INTO THE RECTUM ONCE FOR 1 DOSE FOR GENERALIZED SEIZURE LASTING OVER 3 MINUTES.     • diclofenac (VOLTAREN) 50 MG EC tablet Take 1 tablet by mouth 3 (Three) Times a Day As Needed (pain). 270 tablet 0   • DULoxetine (CYMBALTA) 60 MG capsule Take 60 mg by mouth Daily.     • famotidine (PEPCID) 20 MG tablet Take 20 mg by mouth Daily.     • gabapentin (NEURONTIN) 600 MG tablet Take 600 mg by mouth 4 (Four) Times a Day.  5   • levETIRAcetam (KEPPRA) 500 MG tablet Take 500 mg by mouth 2 (Two) Times a Day.     • metoprolol succinate XL (TOPROL-XL) 25 MG 24 hr tablet Take 25 mg by mouth Daily.  3   • Multiple Vitamins-Minerals (MULTIVITAMIN ADULTS PO) Take 1 tablet by mouth Daily.     • potassium chloride (K-DUR) 10 MEQ CR tablet Take 10 mEq by mouth Daily.     • VITAMIN D,  CHOLECALCIFEROL, PO Take 1 tablet by mouth Daily.       No facility-administered medications prior to visit.       No opioid medication identified on active medication list. I have reviewed chart for other potential  high risk medication/s and harmful drug interactions in the elderly.          Aspirin is on active medication list. Aspirin use is indicated based on review of current medical condition/s. Pros and cons of this therapy have been discussed today. Benefits of this medication outweigh potential harm.  Patient has been encouraged to continue taking this medication.  .      Patient Active Problem List   Diagnosis   • Spastic hemiplegia affecting right dominant side (HCC)   • Pain in right hand   • Muscle hypertonicity   • History of stroke   • History of repair of mitral valve   • High risk medication use   • Dyslipidemia   • Allergic rhinitis   • Osteopenia   • Vitamin D deficiency   • History of parathyroid surgery   • Abnormality of gait following cerebrovascular accident (CVA)   • Left flank pain   • Hydroureteronephrosis   • Unintentional weight loss   • Dizziness   • Hypercalcemia   • Seizure (HCC)   • Anxiety     Advance Care Planning  Advance Directive is not on file.  ACP discussion was held with the patient during this visit. Patient does not have an advance directive, information provided.    Review of Systems   Constitutional: Positive for appetite change. Negative for chills, fatigue and fever.   HENT: Negative for ear pain, sinus pressure, sore throat and trouble swallowing.    Eyes: Negative for discharge and visual disturbance.   Respiratory: Negative for cough, chest tightness and shortness of breath.    Cardiovascular: Negative for chest pain and palpitations.   Gastrointestinal: Negative for abdominal pain, blood in stool, constipation and diarrhea.   Endocrine: Negative for cold intolerance, heat intolerance and polydipsia.   Genitourinary: Negative for dysuria and frequency.  "  Musculoskeletal: Arthralgias: see HPI.   Skin: Negative for rash.   Neurological: Negative for syncope. Weakness: see HPI.   Hematological: Does not bruise/bleed easily.   Psychiatric/Behavioral: Negative for suicidal ideas.        Objective    Vitals:    03/08/22 1042 03/08/22 1143 03/08/22 1144   BP: 96/62 102/62 96/70   BP Location:  Left arm Left arm   Patient Position:  Sitting Standing   Pulse: 78     Temp: 97.8 °F (36.6 °C)     TempSrc: Infrared     SpO2: 100%     Weight: 61 kg (134 lb 6.4 oz)     Height: 175.3 cm (69\")     PainSc:   5     PainLoc: Hand       BMI Readings from Last 1 Encounters:   03/08/22 19.85 kg/m²   BMI is within normal parameters. No follow-up required.    Does the patient have evidence of cognitive impairment? No    Physical Exam  Vitals and nursing note reviewed.   Constitutional:       General: She is not in acute distress.     Appearance: She is well-developed. She is not diaphoretic.   HENT:      Head: Normocephalic and atraumatic.      Right Ear: External ear normal. No decreased hearing noted. Right ear middle ear effusion: TM dull. Tympanic membrane is not erythematous.      Left Ear: External ear normal. No decreased hearing noted. Left ear middle ear effusion: TM dull. Tympanic membrane is not erythematous.      Nose: Septal deviation (to left) present.      Right Sinus: No maxillary sinus tenderness or frontal sinus tenderness.      Left Sinus: No maxillary sinus tenderness or frontal sinus tenderness.      Mouth/Throat:      Mouth: Mucous membranes are moist.      Pharynx: No oropharyngeal exudate or posterior oropharyngeal erythema.   Eyes:      Extraocular Movements: Extraocular movements intact.      Conjunctiva/sclera: Conjunctivae normal.      Pupils: Pupils are equal, round, and reactive to light.   Neck:      Thyroid: No thyromegaly.      Vascular: No carotid bruit.      Trachea: No tracheal deviation.   Cardiovascular:      Rate and Rhythm: Normal rate and regular " rhythm.      Pulses: Normal pulses.      Heart sounds: Normal heart sounds. No murmur heard.  Pulmonary:      Effort: Pulmonary effort is normal. No respiratory distress.      Breath sounds: Examination of the right-lower field reveals decreased breath sounds. Decreased breath sounds present.   Abdominal:      General: Bowel sounds are normal.      Palpations: Abdomen is soft. There is no hepatomegaly or splenomegaly.      Tenderness: There is no abdominal tenderness. There is no guarding.   Musculoskeletal:         General: Normal range of motion.      Cervical back: Normal range of motion and neck supple. No bony tenderness.      Thoracic back: No bony tenderness.      Lumbar back: No bony tenderness.      Right lower leg: No edema.      Left lower leg: No edema.   Lymphadenopathy:      Cervical: No cervical adenopathy.   Skin:     General: Skin is warm and dry.      Findings: No rash.   Neurological:      Mental Status: She is alert and oriented to person, place, and time.      Cranial Nerves: No cranial nerve deficit.      Motor: Weakness:  Right hand grasp slightly weaker than left.      Coordination: Coordination normal.      Gait: Gait (hemiparetic gait with high steps on right.) normal.      Deep Tendon Reflexes: Reflexes are normal and symmetric.   Psychiatric:         Mood and Affect: Mood normal.         Behavior: Behavior normal.         Thought Content: Thought content normal.         Cognition and Memory: Cognition normal.         Judgment: Judgment normal.       Lab Results   Component Value Date    CHLPL 158 03/08/2022    TRIG 147 03/08/2022    HDL 43 03/08/2022    LDL 89 03/08/2022    VLDL 26 03/08/2022            HEALTH RISK ASSESSMENT    Smoking Status:  Social History     Tobacco Use   Smoking Status Never Smoker   Smokeless Tobacco Never Used     Alcohol Consumption:  Social History     Substance and Sexual Activity   Alcohol Use Yes   • Alcohol/week: 1.0 standard drink   • Types: 1 Drinks  containing 0.5 oz of alcohol per week    Comment: social drinker     Fall Risk Screen:    LAURYN Fall Risk Assessment was completed, and patient is at LOW risk for falls.Assessment completed on:3/8/2022    Depression Screening:  PHQ-2/PHQ-9 Depression Screening 3/8/2022   Little Interest or Pleasure in Doing Things 3-->nearly every day   Feeling Down, Depressed or Hopeless 3-->nearly every day   Trouble Falling or Staying Asleep, or Sleeping Too Much 3-->nearly every day   Feeling Tired or Having Little Energy 3-->nearly every day   Poor Appetite or Overeating 3-->nearly every day   Feeling Bad about Yourself - or that You are a Failure or Have Let Yourself or Your Family Down 2-->more than half the days   Trouble Concentrating on Things, Such as Reading the Newspaper or Watching Television 0-->not at all   Moving or Speaking So Slowly that Other People Could Have Noticed? Or the Opposite - Being So Fidgety 0-->not at all   Thoughts that You Would be Better Off Dead or of Hurting Yourself in Some Way 0-->not at all   PHQ-9: Brief Depression Severity Measure Score 17   If You Checked Off Any Problems, How Difficult Have These Problems Made It For You to Do Your Work, Take Care of Things at Home, or Get Along with Other People? not difficult at all       Health Habits and Functional and Cognitive Screening:  Functional & Cognitive Status 3/8/2022   Do you have difficulty preparing food and eating? No   Do you have difficulty bathing yourself, getting dressed or grooming yourself? No   Do you have difficulty using the toilet? No   Do you have difficulty moving around from place to place? No   Do you have trouble with steps or getting out of a bed or a chair? No   Current Diet Frequent Junk Food   Dental Exam Up to date   Eye Exam Up to date   Exercise (times per week) 0 times per week   Current Exercises Include No Regular Exercise   Current Exercise Activities Include -   Do you need help using the phone?  No   Are  you deaf or do you have serious difficulty hearing?  No   Do you need help with transportation? No   Do you need help shopping? No   Do you need help preparing meals?  No   Do you need help with housework?  No   Do you need help with laundry? No   Do you need help taking your medications? No   Do you need help managing money? No   Do you ever drive or ride in a car without wearing a seat belt? No   Have you felt unusual stress, anger or loneliness in the last month? Yes   Who do you live with? Alone   If you need help, do you have trouble finding someone available to you? No   Have you been bothered in the last four weeks by sexual problems? No   Do you have difficulty concentrating, remembering or making decisions? No       Age-appropriate Screening Schedule:  Refer to the list below for future screening recommendations based on patient's age, sex and/or medical conditions. Orders for these recommended tests are listed in the plan section. The patient has been provided with a written plan.    Health Maintenance   Topic Date Due   • DXA SCAN  02/21/2022   • LIPID PANEL  03/08/2023   • PAP SMEAR  07/28/2023   • TDAP/TD VACCINES (3 - Td or Tdap) 12/07/2030   • INFLUENZA VACCINE  Completed     Will consider bone density         Assessment/Plan   CMS Preventative Services Quick Reference  Risk Factors Identified During Encounter  Depression/Dysphoria  Decreased appetite  The above risks/problems have been discussed with the patient.    Discussed low risk for falls and recommended avoiding throw rugs, installing grab bars in bathroom, making sure have handrails on steps, etc.    Follow up actions/plans if indicated are seen below in the Assessment/Plan Section.  Pertinent information has been shared with the patient in the After Visit Summary.    Diagnoses and all orders for this visit:    1. Encounter for Medicare annual wellness exam (Primary)  -     Comprehensive Metabolic Panel  -     CBC & Differential  -     Lipid  Panel With LDL / HDL Ratio  -     XR Chest PA & Lateral; Future    2. Dyslipidemia  Assessment & Plan:  Continue Atorvastatin daily.  Continue to work on healthy diet and exercise.    Orders:  -     Comprehensive Metabolic Panel  -     Lipid Panel With LDL / HDL Ratio    3. Dizziness  Assessment & Plan:  Increase intake of clear liquids.  Try over the counter antihistamine, such as Claritin or Allegra daily.    Orders:  -     CBC & Differential    4. Allergic rhinitis, unspecified seasonality, unspecified trigger  Assessment & Plan:  Try over the counter antihistamine, such as Claritin or Allegra daily.      5. Spastic hemiplegia affecting right dominant side, unspecified etiology (HCC)  Assessment & Plan:  Continue Baclofen twice daily and Diclofenac twice daily.  Continue Gabapentin QID.    Orders:  -     Comprehensive Metabolic Panel    6. Decreased breath sounds at right lung base  -     XR Chest PA & Lateral; Future    7. Moderate episode of recurrent major depressive disorder (HCC)  -     Ambulatory Referral to Psychiatry    8. Anxiety  Assessment & Plan:  Continue Duloxetine daily.    Orders:  -     Ambulatory Referral to Psychiatry    9. Unintentional weight loss  Assessment & Plan:  Make sure eating consistent meals.      10. Seizure (HCC)  Assessment & Plan:  Follow up as scheduled with neurology and epilepsy clinic.      11. Pain in right hand  Assessment & Plan:  Continue Baclofen twice daily and Diclofenac twice daily.      12. History of repair of mitral valve  Assessment & Plan:  Continue Metoprolol daily.  Follow up as scheduled with cardiology.        Follow Up:   Return in about 1 month (around 4/8/2022) for Recheck.or sooner if symptoms persist or worsen.    Will refer to psychiatry for further evaluation of depression/anxiety due to potential for alternative medications to interact with current medications.    An After Visit Summary and PPPS were made available to the patient.

## 2022-03-09 LAB
ALBUMIN SERPL-MCNC: 4.4 G/DL (ref 3.8–4.8)
ALBUMIN/GLOB SERPL: 2.2 {RATIO} (ref 1.2–2.2)
ALP SERPL-CCNC: 103 IU/L (ref 44–121)
ALT SERPL-CCNC: 21 IU/L (ref 0–32)
AST SERPL-CCNC: 16 IU/L (ref 0–40)
BASOPHILS # BLD AUTO: 0 X10E3/UL (ref 0–0.2)
BASOPHILS NFR BLD AUTO: 1 %
BILIRUB SERPL-MCNC: 0.4 MG/DL (ref 0–1.2)
BUN SERPL-MCNC: 17 MG/DL (ref 6–20)
BUN/CREAT SERPL: 22 (ref 9–23)
CALCIUM SERPL-MCNC: 9.5 MG/DL (ref 8.7–10.2)
CHLORIDE SERPL-SCNC: 103 MMOL/L (ref 96–106)
CHOLEST SERPL-MCNC: 158 MG/DL (ref 100–199)
CO2 SERPL-SCNC: 22 MMOL/L (ref 20–29)
CREAT SERPL-MCNC: 0.78 MG/DL (ref 0.57–1)
EGFR GENE MUT ANL BLD/T: 99 ML/MIN/1.73
EOSINOPHIL # BLD AUTO: 0.1 X10E3/UL (ref 0–0.4)
EOSINOPHIL NFR BLD AUTO: 1 %
ERYTHROCYTE [DISTWIDTH] IN BLOOD BY AUTOMATED COUNT: 11.6 % (ref 11.7–15.4)
GLOBULIN SER CALC-MCNC: 2 G/DL (ref 1.5–4.5)
GLUCOSE SERPL-MCNC: 71 MG/DL (ref 65–99)
HCT VFR BLD AUTO: 40.5 % (ref 34–46.6)
HDLC SERPL-MCNC: 43 MG/DL
HGB BLD-MCNC: 13 G/DL (ref 11.1–15.9)
IMM GRANULOCYTES # BLD AUTO: 0 X10E3/UL (ref 0–0.1)
IMM GRANULOCYTES NFR BLD AUTO: 0 %
LDLC SERPL CALC-MCNC: 89 MG/DL (ref 0–99)
LDLC/HDLC SERPL: 2.1 RATIO (ref 0–3.2)
LYMPHOCYTES # BLD AUTO: 1.3 X10E3/UL (ref 0.7–3.1)
LYMPHOCYTES NFR BLD AUTO: 24 %
MCH RBC QN AUTO: 29.4 PG (ref 26.6–33)
MCHC RBC AUTO-ENTMCNC: 32.1 G/DL (ref 31.5–35.7)
MCV RBC AUTO: 92 FL (ref 79–97)
MONOCYTES # BLD AUTO: 0.3 X10E3/UL (ref 0.1–0.9)
MONOCYTES NFR BLD AUTO: 6 %
NEUTROPHILS # BLD AUTO: 3.8 X10E3/UL (ref 1.4–7)
NEUTROPHILS NFR BLD AUTO: 68 %
PLATELET # BLD AUTO: 248 X10E3/UL (ref 150–450)
POTASSIUM SERPL-SCNC: 4.1 MMOL/L (ref 3.5–5.2)
PROT SERPL-MCNC: 6.4 G/DL (ref 6–8.5)
RBC # BLD AUTO: 4.42 X10E6/UL (ref 3.77–5.28)
SODIUM SERPL-SCNC: 142 MMOL/L (ref 134–144)
TRIGL SERPL-MCNC: 147 MG/DL (ref 0–149)
VLDLC SERPL CALC-MCNC: 26 MG/DL (ref 5–40)
WBC # BLD AUTO: 5.6 X10E3/UL (ref 3.4–10.8)

## 2022-03-10 NOTE — ASSESSMENT & PLAN NOTE
Increase intake of clear liquids.  Try over the counter antihistamine, such as Claritin or Allegra daily.

## 2022-03-11 ENCOUNTER — TELEPHONE (OUTPATIENT)
Dept: FAMILY MEDICINE CLINIC | Facility: CLINIC | Age: 42
End: 2022-03-11

## 2022-03-11 ENCOUNTER — HOSPITAL ENCOUNTER (OUTPATIENT)
Dept: GENERAL RADIOLOGY | Facility: HOSPITAL | Age: 42
Discharge: HOME | End: 2022-03-11
Admitting: NURSE PRACTITIONER

## 2022-03-11 DIAGNOSIS — R06.89 DECREASED BREATH SOUNDS AT RIGHT LUNG BASE: ICD-10-CM

## 2022-03-11 PROCEDURE — 71046 X-RAY EXAM CHEST 2 VIEWS: CPT

## 2022-03-11 NOTE — TELEPHONE ENCOUNTER
Spoke with patient over the phone; discussed concerns with medication for mood interacting with other medications or increasing risk for seizure; instructed to check with neurology regarding medication; pending referral to psychiatry; instructed to try to increase exercise to help mood; patient is currently selling her house and will be moving in with her mother or sister.

## 2022-03-11 NOTE — TELEPHONE ENCOUNTER
PT DAUGHTER IS REQUESTING A CALL BACK TO SEE WHEN THE NEW MEDICATION WILL BE CALLED IN THAT WAS DISCUSSED AT HER RECENT APPT.

## 2022-03-31 ENCOUNTER — OFFICE VISIT (OUTPATIENT)
Dept: BEHAVIORAL HEALTH | Facility: CLINIC | Age: 42
End: 2022-03-31

## 2022-03-31 VITALS
HEIGHT: 69 IN | OXYGEN SATURATION: 97 % | BODY MASS INDEX: 19.61 KG/M2 | DIASTOLIC BLOOD PRESSURE: 72 MMHG | WEIGHT: 132.4 LBS | HEART RATE: 86 BPM | SYSTOLIC BLOOD PRESSURE: 108 MMHG

## 2022-03-31 DIAGNOSIS — G89.29 CHRONIC PAIN OF RIGHT HAND: ICD-10-CM

## 2022-03-31 DIAGNOSIS — M79.641 CHRONIC PAIN OF RIGHT HAND: ICD-10-CM

## 2022-03-31 DIAGNOSIS — F43.23 ADJUSTMENT DISORDER WITH MIXED ANXIETY AND DEPRESSED MOOD: ICD-10-CM

## 2022-03-31 PROCEDURE — 90792 PSYCH DIAG EVAL W/MED SRVCS: CPT | Performed by: NURSE PRACTITIONER

## 2022-03-31 RX ORDER — DULOXETIN HYDROCHLORIDE 30 MG/1
30 CAPSULE, DELAYED RELEASE ORAL DAILY
Qty: 30 CAPSULE | Refills: 2 | Status: SHIPPED | OUTPATIENT
Start: 2022-03-31 | End: 2022-05-05 | Stop reason: SDUPTHER

## 2022-03-31 RX ORDER — DULOXETIN HYDROCHLORIDE 60 MG/1
60 CAPSULE, DELAYED RELEASE ORAL DAILY
Qty: 30 CAPSULE | Refills: 2 | Status: SHIPPED | OUTPATIENT
Start: 2022-03-31 | End: 2022-05-05 | Stop reason: SDUPTHER

## 2022-03-31 NOTE — PROGRESS NOTES
Subjective   Ania Reyes is a 41 y.o. female who presents today for initial evaluation     Referring Provider:  Cinthia Mark, APRN  [address] [place] [address]    Chief Complaint:  Depression and anxiety    History of Present Illness: Patient presents today in office with sister, Nicolette Johnson, with a history of depression and anxiety.  Patient suffered a stroke 2015, first seizure occured 6 yrs after stroke in July or August of 2021, and 4 weeks later had another seizure.  Started on Keppra 9/2021. Currently is followed at [facility] and by neurologist.  Patient with right hemiplegia, rle weakness, stability problems occasionally,  aphasia, and limited fine motor to right hand.     Patient dog, Rama, passed away 1 month ago, 2/27/22, from cancer at 5 yrs old.  Patient has been having difficulty coping, as patient did not eat for the first 4 days after, and this past Monday and Tues patient went without eating. Minimal oral intake, drinking big red more than water.  Patient admits to clothes not fitting as well since food deprivation.   Patient admits to anhedonia, crying often, having difficulty getting out of bed, increased desire to sleep, and lacking motivation.     Stressors: [place], spouse who passed away 2 yrs ago, dog passing away, and losing home. Patient sister reports patient was a victim of cat fishing and now house has to be sold as patient sent out [phone] to a boyfriend online.  Patient reports being with boyfriend for 1 yr and 6 months, he was in the , all communication was through online social media, no telephone or video communication.     Patient appeared to do well with loss of , cat fishing episode, but when Rama was put down patient shut down.      Patient will be moving to [place] with mother in approximately 1 month, house has been sold, and all belongings must be out by April 27.  Sister lives in [place], [place]. And assists with medical care  and communication.      Before stroke patient only took vitamin d, has been on Cymbalta since after stroke for pain with nerves to right hand/arm prescribed by neurologist takes in the morning currently. Uncertain of start date, though, sister confirms for several years.           PHQ-9 Depression Screening  PHQ-9 Total Score: 16    Little interest or pleasure in doing things? 2-->more than half the days   Feeling down, depressed, or hopeless? 3-->nearly every day   Trouble falling or staying asleep, or sleeping too much? 3-->nearly every day   Feeling tired or having little energy? 3-->nearly every day   Poor appetite or overeating? 3-->nearly every day   Feeling bad about yourself - or that you are a failure or have let yourself or your family down? 2-->more than half the days   Trouble concentrating on things, such as reading the newspaper or watching television? 0-->not at all   Moving or speaking so slowly that other people could have noticed? Or the opposite - being so fidgety or restless that you have been moving around a lot more than usual? 0-->not at all   Thoughts that you would be better off dead, or of hurting yourself in some way? 0-->not at all   PHQ-9 Total Score 16     CALIN-7  Feeling nervous, anxious or on edge: Not at all  Not being able to stop or control worrying: More than half the days  Worrying too much about different things: Not at all  Trouble Relaxing: Not at all  Being so restless that it is hard to sit still: Not at all  Feeling afraid as if something awful might happen: Not at all  Becoming easily annoyed or irritable: Not at all  CALIN 7 Total Score: 2  If you checked any problems, how difficult have these problems made it for you to do your work, take care of things at home, or get along with other people: Not difficult at all    Past Surgical History:  Past Surgical History:   Procedure Laterality Date   • CARDIAC SURGERY  2015   • CYSTOSCOPY URETEROSCOPY LASER LITHOTRIPSY     • KNEE  SURGERY Left    • MITRAL VALVE REPAIR/REPLACEMENT  03/2015    annuloplasty ring, complex MV repair with significant intra-op and post-op complications   • OTHER SURGICAL HISTORY      Selective Arterial Catheterization    • PARATHYROID GLAND SURGERY  2014    Parathyroid resection       Problem List:  Patient Active Problem List   Diagnosis   • Spastic hemiplegia affecting right dominant side (HCC)   • Pain in right hand   • Muscle hypertonicity   • History of stroke   • History of repair of mitral valve   • High risk medication use   • Dyslipidemia   • Allergic rhinitis   • Osteopenia   • Vitamin D deficiency   • History of parathyroid surgery   • Abnormality of gait following cerebrovascular accident (CVA)   • Left flank pain   • Hydroureteronephrosis   • Unintentional weight loss   • Dizziness   • Hypercalcemia   • Seizure (HCC)   • Anxiety       Allergy:   No Known Allergies     Discontinued Medications:  Medications Discontinued During This Encounter   Medication Reason   • DULoxetine (CYMBALTA) 60 MG capsule Dose adjustment       Current Medications:   Current Outpatient Medications   Medication Sig Dispense Refill   • aspirin 81 MG tablet Take 81 mg by mouth Daily.     • atorvastatin (LIPITOR) 20 MG tablet Take 20 mg by mouth Daily.     • baclofen (LIORESAL) 10 MG tablet Take 1 tablet by mouth 2 (Two) Times a Day As Needed for Muscle Spasms. 180 tablet 1   • diazePAM (DIASTAT ACUDIAL) 20 MG rectal kit INSERT 12.5MG INTO THE RECTUM ONCE FOR 1 DOSE FOR GENERALIZED SEIZURE LASTING OVER 3 MINUTES.     • diclofenac (VOLTAREN) 50 MG EC tablet Take 1 tablet by mouth 3 (Three) Times a Day As Needed (pain). 270 tablet 0   • DULoxetine (CYMBALTA) 60 MG capsule Take 1 capsule by mouth Daily for 90 days. Take with 30 mg capsule  Indications: Major Depressive Disorder, Disease of the Peripheral Nerves 30 capsule 2   • famotidine (PEPCID) 20 MG tablet Take 20 mg by mouth Daily.     • gabapentin (NEURONTIN) 600 MG tablet Take  600 mg by mouth 4 (Four) Times a Day.  5   • levETIRAcetam (KEPPRA) 500 MG tablet Take 500 mg by mouth 2 (Two) Times a Day.     • metoprolol succinate XL (TOPROL-XL) 25 MG 24 hr tablet Take 25 mg by mouth Daily.  3   • Multiple Vitamins-Minerals (MULTIVITAMIN ADULTS PO) Take 1 tablet by mouth Daily.     • potassium chloride (K-DUR) 10 MEQ CR tablet Take 10 mEq by mouth Daily.     • VITAMIN D, CHOLECALCIFEROL, PO Take 1 tablet by mouth Daily.     • DULoxetine (Cymbalta) 30 MG capsule Take 1 capsule by mouth Daily for 90 days. Take with 60 mg capsule  Indications: Major Depressive Disorder, Disease of the Peripheral Nerves 30 capsule 2     No current facility-administered medications for this visit.       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 ([facility]) 5/11/2015    Description: 02- - (N) Coronaries - LVEF 55% - Severe prolapse of the anterior & posterior mitral leaflet with severe MR,   • Functional cardiac murmur 4/14/2014   • Gingival enlargement 11/17/2016   • Gum hypertrophy    • Heart failure ([facility]) 8/13/2021    NYHA class 3 NYHA class 3 NYHA class 3 NYHA class 3   • High risk medication use    • History of acute sinusitis    • History of dizziness    • History of echocardiogram    • History of hyperparathyroidism    • History of kidney stones     Bilateral   • History of low back pain    • History of mitral valve repair     [employer] annuloplasty ring, complex MV repair with significant intra-op and post op complications   • History of nephrolithiasis    • History of stroke     LMCA; right  hemiplegia   • History of transesophageal echocardiography ([facility])    • Kidney stone     3mm    • Lactase deficiency    • Left nephrolithiasis 2019   • Methicillin resistant Staphylococcus aureus infection 3/13/2015    No A2K system hx - now +MRSA sputum on 3/13/2015 No A2K system hx - now +MRSA sputum on 3/13/2015 No A2K system hx - now +MRSA sputum on 3/13/2015 No A2K system hx - now +MRSA sputum on 3/13/2015   • Mitral regurgitation    • Mitral valve prolapse    • Muscle hypertonicity    • Need for SBE (subacute bacterial endocarditis) prophylaxis 2019   • Osteopenia    • Pain in right hand    • Pain, wrist joint    • Parathyroid adenoma    • Positive depression screening    • Primary hyperparathyroidism (HCC)    • Right arm pain    • Right hemiplegia (HCC) 2015   • Seizures (HCC)    • Sinus tachycardia    • Spastic hemiplegia affecting dominant side (HCC)    • Vitamin D deficiency         Past Psychiatric History:  Began Treatment:3/31/22  Diagnoses:Depression and Anxiety  Psychiatrist:Denies  Therapist:Denies  Admission History:Denies  Medication Trials:2015 after stroke for 6 months, unable to recal name  Self Harm: Denies  Suicide Attempts:Denies   Psychosis, Anxiety, Depression: n/a    Substance Abuse History:   Types:Denies all, including illicit  Withdrawal Symptoms:Denies  Longest Period Sober:Not Applicable   AA: Not applicable     Social History:  Martial Status:Single  for 2 yrs   Employed:No disabled  Kids:No  House:Lives in a house with mother (currently in process of moving)   History: Denies  Access to Guns:  no    Social History     Socioeconomic History   • Marital status:    Tobacco Use   • Smoking status: Never Smoker   • Smokeless tobacco: Never Used   Vaping Use   • Vaping Use: Never used   Substance and Sexual Activity   • Alcohol use: Yes     Alcohol/week: 1.0 standard drink     Types: 1 Drinks containing 0.5 oz of alcohol per week     Comment:  social drinker   • Drug use: Never   • Sexual activity: Yes     Partners: Male     Birth control/protection: None       Family History:   Suicide Attempts: Denies  Suicide Completions:Denies      Family History   Problem Relation Age of Onset   • Depression Mother    • Anxiety disorder Mother    • Coronary artery disease Mother    • Glaucoma Mother    • Nephrolithiasis Mother    • No Known Problems Father    • Anxiety disorder Sister    • Breast cancer Maternal Aunt         diagnosed in 40s   • Ovarian cancer Maternal Aunt    • Osteoporosis Maternal Aunt    • Coronary artery disease Maternal Grandfather    • Heart attack Maternal Grandfather          at age 54 years   • Colon cancer Maternal Grandmother         diagnosed in her 60's   • Breast cancer Maternal Grandmother    • Ovarian cancer Maternal Grandmother    • Colon cancer Paternal Grandfather    • No Known Problems Other        Developmental History:   Born: [place]  Siblings:1 brother, 1 sister  Childhood: Denies Abuse  High School:Completed  College:almost completed nursing, due to stroke (1/2 yr left)    Mental Status Exam:   Hygiene:   good  Cooperation:  Cooperative  Eye Contact:  Good  Psychomotor Behavior:  Appropriate  Affect:  Appropriate  Mood: sad and depressed  Speech:  Aphasic  Thought Process:  Goal directed  Thought Content:  Mood congruent  Suicidal:  None  Homicidal:  None  Hallucinations:  None  Delusion:  None  Memory:  Intact  Orientation:  Person, Place, Time and Situation  Reliability:  fair  Insight:  Good  Judgement:  Good  Impulse Control:  Good  Physical/Medical Issues:  Yes Stroke with rt sided weakness, HLD, Sz 6 yrs post stroke, orthostatic hypotension, s/p MVR, spastic hemiplegia to rt dominant hand     Review of Systems:  Review of Systems   Constitutional: Positive for fatigue. Negative for diaphoresis.   HENT: Negative for drooling.    Eyes: Negative for visual disturbance.   Respiratory: Negative for cough and shortness of  "breath.    Cardiovascular: Negative for chest pain, palpitations and leg swelling.   Gastrointestinal: Negative for nausea and vomiting.   Endocrine: Negative for cold intolerance and heat intolerance.   Genitourinary: Negative for difficulty urinating.   Musculoskeletal: Negative for joint swelling.   Allergic/Immunologic: Negative for immunocompromised state.   Neurological: Positive for dizziness and seizures. Negative for speech difficulty and numbness.         Physical Exam:  Physical Exam    Vital Signs:   /72   Pulse 86   Ht 175.3 cm (69\")   Wt 60.1 kg (132 lb 6.4 oz)   SpO2 97%   BMI 19.55 kg/m²      Lab Results:   Office Visit on 03/08/2022   Component Date Value Ref Range Status   • Glucose 03/08/2022 71  65 - 99 mg/dL Final   • BUN 03/08/2022 17  6 - 20 mg/dL Final   • Creatinine 03/08/2022 0.78  0.57 - 1.00 mg/dL Final   • EGFR Result 03/08/2022 99  >59 mL/min/1.73 Final   • BUN/Creatinine Ratio 03/08/2022 22  9 - 23 Final   • Sodium 03/08/2022 142  134 - 144 mmol/L Final   • Potassium 03/08/2022 4.1  3.5 - 5.2 mmol/L Final   • Chloride 03/08/2022 103  96 - 106 mmol/L Final   • Total CO2 03/08/2022 22  20 - 29 mmol/L Final   • Calcium 03/08/2022 9.5  8.7 - 10.2 mg/dL Final   • Total Protein 03/08/2022 6.4  6.0 - 8.5 g/dL Final   • Albumin 03/08/2022 4.4  3.8 - 4.8 g/dL Final   • Globulin 03/08/2022 2.0  1.5 - 4.5 g/dL Final   • A/G Ratio 03/08/2022 2.2  1.2 - 2.2 Final   • Total Bilirubin 03/08/2022 0.4  0.0 - 1.2 mg/dL Final   • Alkaline Phosphatase 03/08/2022 103  44 - 121 IU/L Final   • AST (SGOT) 03/08/2022 16  0 - 40 IU/L Final   • ALT (SGPT) 03/08/2022 21  0 - 32 IU/L Final   • WBC 03/08/2022 5.6  3.4 - 10.8 x10E3/uL Final   • RBC 03/08/2022 4.42  3.77 - 5.28 x10E6/uL Final   • Hemoglobin 03/08/2022 13.0  11.1 - 15.9 g/dL Final   • Hematocrit 03/08/2022 40.5  34.0 - 46.6 % Final   • MCV 03/08/2022 92  79 - 97 fL Final   • MCH 03/08/2022 29.4  26.6 - 33.0 pg Final   • MCHC 03/08/2022 32.1 "  31.5 - 35.7 g/dL Final   • RDW 03/08/2022 11.6 (A) 11.7 - 15.4 % Final   • Platelets 03/08/2022 248  150 - 450 x10E3/uL Final   • Neutrophil Rel % 03/08/2022 68  Not Estab. % Final   • Lymphocyte Rel % 03/08/2022 24  Not Estab. % Final   • Monocyte Rel % 03/08/2022 6  Not Estab. % Final   • Eosinophil Rel % 03/08/2022 1  Not Estab. % Final   • Basophil Rel % 03/08/2022 1  Not Estab. % Final   • Neutrophils Absolute 03/08/2022 3.8  1.4 - 7.0 x10E3/uL Final   • Lymphocytes Absolute 03/08/2022 1.3  0.7 - 3.1 x10E3/uL Final   • Monocytes Absolute 03/08/2022 0.3  0.1 - 0.9 x10E3/uL Final   • Eosinophils Absolute 03/08/2022 0.1  0.0 - 0.4 x10E3/uL Final   • Basophils Absolute 03/08/2022 0.0  0.0 - 0.2 x10E3/uL Final   • Immature Granulocyte Rel % 03/08/2022 0  Not Estab. % Final   • Immature Grans Absolute 03/08/2022 0.0  0.0 - 0.1 x10E3/uL Final   • Total Cholesterol 03/08/2022 158  100 - 199 mg/dL Final   • Triglycerides 03/08/2022 147  0 - 149 mg/dL Final   • HDL Cholesterol 03/08/2022 43  >39 mg/dL Final   • VLDL Cholesterol Miles 03/08/2022 26  5 - 40 mg/dL Final   • LDL Chol Calc ([facility]) 03/08/2022 89  0 - 99 mg/dL Final   • LDL/HDL RATIO 03/08/2022 2.1  0.0 - 3.2 ratio Final    Comment:                                     LDL/HDL Ratio                                              Men  Women                                1/2 Avg.Risk  1.0    1.5                                    Avg.Risk  3.6    3.2                                 2X Avg.Risk  6.2    5.0                                 3X Avg.Risk  8.0    6.1     Office Visit on 11/05/2021   Component Date Value Ref Range Status   • WBC 11/05/2021 4.8  3.4 - 10.8 x10E3/uL Final   • RBC 11/05/2021 4.54  3.77 - 5.28 x10E6/uL Final   • Hemoglobin 11/05/2021 13.1  11.1 - 15.9 g/dL Final   • Hematocrit 11/05/2021 40.1  34.0 - 46.6 % Final   • MCV 11/05/2021 88  79 - 97 fL Final   • MCH 11/05/2021 28.9  26.6 - 33.0 pg Final   • MCHC 11/05/2021 32.7  31.5 - 35.7 g/dL Final    • RDW 11/05/2021 12.1  11.7 - 15.4 % Final   • Platelets 11/05/2021 209  150 - 450 x10E3/uL Final   • Neutrophil Rel % 11/05/2021 60  Not Estab. % Final   • Lymphocyte Rel % 11/05/2021 29  Not Estab. % Final   • Monocyte Rel % 11/05/2021 8  Not Estab. % Final   • Eosinophil Rel % 11/05/2021 2  Not Estab. % Final   • Basophil Rel % 11/05/2021 1  Not Estab. % Final   • Neutrophils Absolute 11/05/2021 2.9  1.4 - 7.0 x10E3/uL Final   • Lymphocytes Absolute 11/05/2021 1.4  0.7 - 3.1 x10E3/uL Final   • Monocytes Absolute 11/05/2021 0.4  0.1 - 0.9 x10E3/uL Final   • Eosinophils Absolute 11/05/2021 0.1  0.0 - 0.4 x10E3/uL Final   • Basophils Absolute 11/05/2021 0.1  0.0 - 0.2 x10E3/uL Final   • Immature Granulocyte Rel % 11/05/2021 0  Not Estab. % Final   • Immature Grans Absolute 11/05/2021 0.0  0.0 - 0.1 x10E3/uL Final   • Glucose 11/05/2021 64 (A) 65 - 99 mg/dL Final   • BUN 11/05/2021 13  6 - 20 mg/dL Final   • Creatinine 11/05/2021 0.72  0.57 - 1.00 mg/dL Final   • eGFR Non  Am 11/05/2021 106  >59 mL/min/1.73 Final   • eGFR African Am 11/05/2021 122  >59 mL/min/1.73 Final    Comment: **In accordance with recommendations from the NKF-ASN Task force,**    [facility] is in the process of updating its eGFR calculation to the    2021 CKD-EPI creatinine equation that estimates kidney function    without a race variable.     • BUN/Creatinine Ratio 11/05/2021 18  9 - 23 Final   • Sodium 11/05/2021 144  134 - 144 mmol/L Final   • Potassium 11/05/2021 3.6  3.5 - 5.2 mmol/L Final   • Chloride 11/05/2021 105  96 - 106 mmol/L Final   • Total CO2 11/05/2021 26  20 - 29 mmol/L Final   • Calcium 11/05/2021 9.4  8.7 - 10.2 mg/dL Final   • Total Protein 11/05/2021 6.1  6.0 - 8.5 g/dL Final   • Albumin 11/05/2021 4.1  3.8 - 4.8 g/dL Final   • Globulin 11/05/2021 2.0  1.5 - 4.5 g/dL Final   • A/G Ratio 11/05/2021 2.1  1.2 - 2.2 Final   • Total Bilirubin 11/05/2021 0.3  0.0 - 1.2 mg/dL Final   • Alkaline Phosphatase 11/05/2021 96  44  - 121 IU/L Final                  **Please note reference interval change**   • AST (SGOT) 11/05/2021 17  0 - 40 IU/L Final   • ALT (SGPT) 11/05/2021 20  0 - 32 IU/L Final       EKG Results:  No orders to display       Imaging Results:  XR Chest PA & Lateral    Result Date: 3/13/2022  No focal pulmonary consolidation. Follow-up as clinical indications persist.  This report was finalized on 3/13/2022 10:34 AM by Dr. Meliton Simental M.D.          Assessment/Plan   Diagnoses and all orders for this visit:    1. Adjustment disorder with mixed anxiety and depressed mood  -     DULoxetine (CYMBALTA) 60 MG capsule; Take 1 capsule by mouth Daily for 90 days. Take with 30 mg capsule  Indications: Major Depressive Disorder, Disease of the Peripheral Nerves  Dispense: 30 capsule; Refill: 2  -     DULoxetine (Cymbalta) 30 MG capsule; Take 1 capsule by mouth Daily for 90 days. Take with 60 mg capsule  Indications: Major Depressive Disorder, Disease of the Peripheral Nerves  Dispense: 30 capsule; Refill: 2    2. Chronic pain of right hand  -     DULoxetine (CYMBALTA) 60 MG capsule; Take 1 capsule by mouth Daily for 90 days. Take with 30 mg capsule  Indications: Major Depressive Disorder, Disease of the Peripheral Nerves  Dispense: 30 capsule; Refill: 2  -     DULoxetine (Cymbalta) 30 MG capsule; Take 1 capsule by mouth Daily for 90 days. Take with 60 mg capsule  Indications: Major Depressive Disorder, Disease of the Peripheral Nerves  Dispense: 30 capsule; Refill: 2        Visit Diagnoses:    ICD-10-CM ICD-9-CM   1. Adjustment disorder with mixed anxiety and depressed mood  F43.23 309.28   2. Chronic pain of right hand  M79.641 729.5    G89.29 338.29       PLAN:  1. Safety: No acute safety concerns  2. Therapy: None  3. Risk Assessment: Risk of self-harm acutely is low.  Risk factors include anxiety disorder, mood disorder, and recent psychosocial stressors (pandemic). Protective factors include no family history, denies  access to guns/weapons, no present SI, no history of suicide attempts or self-harm in the past, minimal AODA, healthcare seeking, future orientation, willingness to engage in care.  Risk of self-harm chronically is also low, but could be further elevated in the event of treatment noncompliance and/or AODA.  4. Meds: Increase Cymbalta from 60 mg to 90 mg by mouth daily in the morning to target chronic pain as previously ordered per neurologist and depression with anxiety. Discussed all risks, benefits, alternatives, and side effects of Duloxetine including but not limited to GI upset, sexual dysfunction, bleeding risk, seizure risk, weight loss, insomnia, diaphoresis, drowsiness, headache, dizziness, fatigue, activation of radha or hypomania, increased fragility fracture risk, hyponatremia, increased BP, hepatotoxicity, ocular effects, withdrawal syndrome following abrupt discontinuation, serotonin syndrome, and activation of suicidal ideation and behavior. Pt educated on the need to practice safe sex while taking this med. Discussed the need for pt to immediately call the office for any new or worsening symptoms, such as worsening depression; feeling nervous or restless; suicidal thoughts or actions; or other changes changes in mood or behavior, and all other concerns. Pt educated on med compliance and the risks of suddenly stopping this medication or missing doses. Pt verbalized understanding and is agreeable to taking Duloxetine. Addressed all questions and concerns.   5. Labs: n/a    Will see patient back in 5 weeks as patient will be moving out of home into mother's home and must have all belongings out of home by 4/27/22.        Patient screened positive for depression based on a PHQ-9 score of 16 on 3/31/2022. Follow-up recommendations include: Prescribed antidepressant medication treatment and Suicide Risk Assessment performed.           TREATMENT PLAN/GOALS: Continue supportive psychotherapy efforts and  medications as indicated. Treatment and medication options discussed during today's visit. Patient acknowledged and verbally consented to continue with current treatment plan and was educated on the importance of compliance with treatment and follow-up appointments.    MEDICATION ISSUES:  LAMONTE reviewed as expected.  Discussed medication options and treatment plan of prescribed medication as well as the risks, benefits, and side effects including potential falls, possible impaired driving and metabolic adversities among others. Patient is agreeable to call the office with any worsening of symptoms or onset of side effects. Patient is agreeable to call 911 or go to the nearest ER should he/she begin having SI/HI. No medication side effects or related complaints today.     MEDS ORDERED DURING VISIT:  New Medications Ordered This Visit   Medications   • DULoxetine (CYMBALTA) 60 MG capsule     Sig: Take 1 capsule by mouth Daily for 90 days. Take with 30 mg capsule  Indications: Major Depressive Disorder, Disease of the Peripheral Nerves     Dispense:  30 capsule     Refill:  2   • DULoxetine (Cymbalta) 30 MG capsule     Sig: Take 1 capsule by mouth Daily for 90 days. Take with 60 mg capsule  Indications: Major Depressive Disorder, Disease of the Peripheral Nerves     Dispense:  30 capsule     Refill:  2       Return in about 5 weeks (around 5/5/2022) for Next scheduled follow up.         I spent 74 minutes caring for Ania on this date of service. This time includes time spent by me in the following activities: preparing for the visit, reviewing tests, obtaining and/or reviewing a separately obtained history, performing a medically appropriate examination and/or evaluation, counseling and educating the patient/family/caregiver, ordering medications, tests, or procedures, referring and communicating with other health care professionals and documenting information in the medical record.      This document has been  electronically signed by YONG Aranda  March 31, 2022 17:13 EDT      Part of this note may be an electronic transcription/translation of spoken language to printed text using the Dragon Dictation System.

## 2022-03-31 NOTE — PATIENT INSTRUCTIONS
1.  Please return to clinic at your next scheduled visit.  Contact the clinic ([phone]) at least 24 hours prior in the event you need to cancel.  2.  Do no harm to yourself or others.    3.  Avoid alcohol and drugs.    4.  Take all medications as prescribed.  Please contact the clinic with any concerns. If you are in need of medication refills, please call the clinic at [phone].    5. Should you want to get in touch with your provider, YONG Aranda, please utilize [email or web address] or contact the office ([phone]), and staff will be able to page the provider on call directly.  6.  In the event you have personal crisis, contact the following crisis numbers: Suicide Prevention Hotline 1-422.191.7666; [facility] Helpline 4-211-399-XTFB; [facility] Emergency Room [phone]; text HELLO to 789690; or 546.     SPECIFIC RECOMMENDATIONS:     1.      Medications discussed at this encounter:                   - Increase Cymbalta from 60 mg to 90 mg by mouth daily in the morning, you will take 1 of the 60 mg with 1 of the 30 mg to equal 90 mg total     2.      Psychotherapy recommendations: n/a     3.     Return to clinic: 5 weeks after move

## 2022-04-11 ENCOUNTER — OFFICE VISIT (OUTPATIENT)
Dept: FAMILY MEDICINE CLINIC | Facility: CLINIC | Age: 42
End: 2022-04-11

## 2022-04-11 VITALS
OXYGEN SATURATION: 100 % | HEART RATE: 82 BPM | DIASTOLIC BLOOD PRESSURE: 72 MMHG | BODY MASS INDEX: 20.17 KG/M2 | SYSTOLIC BLOOD PRESSURE: 118 MMHG | TEMPERATURE: 97.3 F | WEIGHT: 136.2 LBS | HEIGHT: 69 IN

## 2022-04-11 DIAGNOSIS — F33.1 MODERATE EPISODE OF RECURRENT MAJOR DEPRESSIVE DISORDER: ICD-10-CM

## 2022-04-11 DIAGNOSIS — R10.9 LEFT FLANK PAIN: Primary | ICD-10-CM

## 2022-04-11 DIAGNOSIS — R63.4 UNINTENTIONAL WEIGHT LOSS: ICD-10-CM

## 2022-04-11 DIAGNOSIS — Z98.890 HISTORY OF REPAIR OF MITRAL VALVE: ICD-10-CM

## 2022-04-11 DIAGNOSIS — F41.9 ANXIETY: ICD-10-CM

## 2022-04-11 PROCEDURE — 99214 OFFICE O/P EST MOD 30 MIN: CPT | Performed by: NURSE PRACTITIONER

## 2022-04-11 NOTE — PATIENT INSTRUCTIONS
Try heat/ice to lower back, whichever feels better.  Try gentle stretching before bed.  Follow up in 4 months, or sooner if symptoms persist or worsen.  Follow up as scheduled with psychiatry.

## 2022-04-11 NOTE — PROGRESS NOTES
Answers for HPI/ROS submitted by the patient on 4/4/2022  Please describe your symptoms.: Sleep and crying  Have you had these symptoms before?: Yes  How long have you been having these symptoms?: Greater than 2 weeks  Please list any medications you are currently taking for this condition.: 90 mg  Please describe any probable cause for these symptoms. : I don't know  What is the primary reason for your visit?: Other    Subjective   Ania Reyes is a 41 y.o. female.     Chief Complaint   Patient presents with   • Hypertension     Follow up    • Anxiety   • Depression       History of Present Illness   Patient presents for follow up S/P MVR: takes Metoprolol daily; does not typically monitor BP; dizziness has improved; no swelling; no headaches.    F/U anxiety/depression; saw psychiatry and increased Duloxetine to 90 mg daily; not sure if much improvement in mood; no worsening of mood; has not seen counselor, talks to sister and mom about feelings; worries that seeing counselor would be hard due to aphasia; sold house and will be moving in with mother; dog recently passed away and lost spouse 2 years ago; has been around mother and sister who have dogs and helps; has follow up with psychiatry in 3 weeks; see PHQ-9; no SI/HI; no seizures.    F/U weight loss: has improved; does not get up off couch to fix something to eat; has been eating better when eats with sister or mom; eats very good if goes out to eat; does not eat as much when home by herself.     Sister was present during the history-taking and subsequent discussion with this patient.  Patient agrees to the presence of the individual during this visit.      The following portions of the patient's history were reviewed and updated as appropriate: allergies, current medications, past family history, past medical history, past social history, past surgical history and problem list.    Current Outpatient Medications on File Prior to Visit   Medication Sig   •  aspirin 81 MG tablet Take 81 mg by mouth Daily.   • atorvastatin (LIPITOR) 20 MG tablet Take 20 mg by mouth Daily.   • baclofen (LIORESAL) 10 MG tablet Take 1 tablet by mouth 2 (Two) Times a Day As Needed for Muscle Spasms.   • diazePAM (DIASTAT ACUDIAL) 20 MG rectal kit INSERT 12.5MG INTO THE RECTUM ONCE FOR 1 DOSE FOR GENERALIZED SEIZURE LASTING OVER 3 MINUTES.   • diclofenac (VOLTAREN) 50 MG EC tablet Take 1 tablet by mouth 3 (Three) Times a Day As Needed (pain).   • DULoxetine (Cymbalta) 30 MG capsule Take 1 capsule by mouth Daily for 90 days. Take with 60 mg capsule  Indications: Major Depressive Disorder, Disease of the Peripheral Nerves   • DULoxetine (CYMBALTA) 60 MG capsule Take 1 capsule by mouth Daily for 90 days. Take with 30 mg capsule  Indications: Major Depressive Disorder, Disease of the Peripheral Nerves   • famotidine (PEPCID) 20 MG tablet Take 20 mg by mouth Daily.   • gabapentin (NEURONTIN) 600 MG tablet Take 600 mg by mouth 4 (Four) Times a Day.   • levETIRAcetam (KEPPRA) 500 MG tablet Take 500 mg by mouth 2 (Two) Times a Day.   • metoprolol succinate XL (TOPROL-XL) 25 MG 24 hr tablet Take 25 mg by mouth Daily.   • Multiple Vitamins-Minerals (MULTIVITAMIN ADULTS PO) Take 1 tablet by mouth Daily.   • potassium chloride (K-DUR) 10 MEQ CR tablet Take 10 mEq by mouth Daily.   • VITAMIN D, CHOLECALCIFEROL, PO Take 1 tablet by mouth Daily.     No current facility-administered medications on file prior to visit.        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    Description: 02- - (N) Coronaries  - LVEF 55% - Severe prolapse of the anterior & posterior mitral leaflet with severe MR,   • Functional cardiac murmur 4/14/2014   • Gingival enlargement 11/17/2016   • Gum hypertrophy    • Heart failure (HCC) 8/13/2021    NYHA class 3 NYHA class 3 NYHA class 3 NYHA class 3   • High risk medication use    • History of acute sinusitis    • History of dizziness    • History of echocardiogram    • History of hyperparathyroidism    • History of kidney stones     Bilateral   • History of low back pain    • History of mitral valve repair     [employer] annuloplasty ring, complex MV repair with significant intra-op and post op complications   • History of nephrolithiasis    • History of stroke     LMCA; right hemiplegia   • History of transesophageal echocardiography ([facility])    • Kidney stone     3mm    • Lactase deficiency    • Left nephrolithiasis 8/12/2019   • Methicillin resistant Staphylococcus aureus infection 3/13/2015    No A2K system hx - now +MRSA sputum on 3/13/2015 No A2K system hx - now +MRSA sputum on 3/13/2015 No A2K system hx - now +MRSA sputum on 3/13/2015 No A2K system hx - now +MRSA sputum on 3/13/2015   • Mitral regurgitation    • Mitral valve prolapse    • Muscle hypertonicity    • Need for SBE (subacute bacterial endocarditis) prophylaxis 8/12/2019   • Osteopenia    • Pain in right hand    • Pain, wrist joint    • Parathyroid adenoma    • Positive depression screening    • Primary hyperparathyroidism (HCC)    • Right arm pain    • Right hemiplegia ([facility]) 5/28/2015   • Seizures ([facility])    • Sinus tachycardia    • Spastic hemiplegia affecting dominant side ([facility])    • Vitamin D deficiency        Past Surgical History:   Procedure Laterality Date   • CARDIAC SURGERY  2015   • CYSTOSCOPY URETEROSCOPY LASER LITHOTRIPSY     • KNEE SURGERY Left    • MITRAL VALVE REPAIR/REPLACEMENT  03/2015    annuloplasty ring, complex MV repair with significant intra-op and post-op complications   • OTHER SURGICAL  HISTORY      Selective Arterial Catheterization    • PARATHYROID GLAND SURGERY  2014    Parathyroid resection       Family History   Problem Relation Age of Onset   • Depression Mother    • Anxiety disorder Mother    • Coronary artery disease Mother    • Glaucoma Mother    • Nephrolithiasis Mother    • No Known Problems Father    • Anxiety disorder Sister    • Breast cancer Maternal Aunt         diagnosed in 40s   • Ovarian cancer Maternal Aunt    • Osteoporosis Maternal Aunt    • Coronary artery disease Maternal Grandfather    • Heart attack Maternal Grandfather          at age 54 years   • Colon cancer Maternal Grandmother         diagnosed in her 60's   • Breast cancer Maternal Grandmother    • Ovarian cancer Maternal Grandmother    • Colon cancer Paternal Grandfather    • No Known Problems Other        Social History     Socioeconomic History   • Marital status:    Tobacco Use   • Smoking status: Never Smoker   • Smokeless tobacco: Never Used   Vaping Use   • Vaping Use: Never used   Substance and Sexual Activity   • Alcohol use: Yes     Alcohol/week: 1.0 standard drink     Types: 1 Drinks containing 0.5 oz of alcohol per week     Comment: social drinker   • Drug use: Never   • Sexual activity: Yes     Partners: Male     Birth control/protection: None       Review of Systems   Constitutional: Negative for chills, fatigue, fever, unexpected weight gain and unexpected weight loss. Appetite change: see HPI.   HENT: Negative for ear pain, sinus pressure, sore throat and trouble swallowing.    Eyes: Negative for blurred vision.   Respiratory: Negative for cough, chest tightness and shortness of breath.    Cardiovascular: Negative for chest pain and palpitations.   Gastrointestinal: Negative for abdominal pain, blood in stool, constipation and diarrhea.   Endocrine: Negative for polydipsia.   Genitourinary: Negative for dysuria, frequency and hematuria.   Musculoskeletal: Positive for back pain (still  "having discomfort in left lower back; notes more at night when laying down; takes Baclofen twice daily and Diclofenac twice daily, in early and late mornings; no radiation of pain; no bladder or bowel dysfunction).   Skin: Negative for rash.   Neurological: Negative for syncope and numbness.   Hematological: Does not bruise/bleed easily.   Psychiatric/Behavioral: Negative for suicidal ideas.       PHQ-9 Depression Screening  Little interest or pleasure in doing things? 2-->more than half the days   Feeling down, depressed, or hopeless? 0-->not at all   Trouble falling or staying asleep, or sleeping too much? 0-->not at all   Feeling tired or having little energy? 0-->not at all   Poor appetite or overeating? 1-->several days   Feeling bad about yourself - or that you are a failure or have let yourself or your family down? 0-->not at all   Trouble concentrating on things, such as reading the newspaper or watching television? 0-->not at all   Moving or speaking so slowly that other people could have noticed? Or the opposite - being so fidgety or restless that you have been moving around a lot more than usual? 0-->not at all   Thoughts that you would be better off dead, or of hurting yourself in some way? 0-->not at all   PHQ-9 Total Score 3   If you checked off any problems, how difficult have these problems made it for you to do your work, take care of things at home, or get along with other people? not difficult at all         Objective   Vitals:    04/11/22 0954   BP: 118/72   BP Location: Right arm   Patient Position: Sitting   Cuff Size: Adult   Pulse: 82   Temp: 97.3 °F (36.3 °C)   SpO2: 100%   Weight: 61.8 kg (136 lb 3.2 oz)   Height: 175.3 cm (69\")     Body mass index is 20.11 kg/m².    Physical Exam  Vitals and nursing note reviewed.   Constitutional:       General: She is not in acute distress.     Appearance: She is well-developed and well-groomed. She is not diaphoretic.   HENT:      Head: Normocephalic. "      Right Ear: External ear normal.      Left Ear: External ear normal.   Eyes:      Conjunctiva/sclera: Conjunctivae normal.   Neck:      Vascular: No carotid bruit.   Cardiovascular:      Rate and Rhythm: Normal rate and regular rhythm.      Pulses: Normal pulses.      Heart sounds: Normal heart sounds. No murmur heard.  Pulmonary:      Effort: Pulmonary effort is normal. No respiratory distress.      Breath sounds: Normal breath sounds.   Abdominal:      General: Bowel sounds are normal.      Palpations: Abdomen is soft. There is no hepatomegaly or splenomegaly.      Tenderness: There is no abdominal tenderness. There is no guarding.   Musculoskeletal:      Cervical back: Normal range of motion and neck supple. No bony tenderness.      Thoracic back: No bony tenderness.      Lumbar back: No bony tenderness. Negative right straight leg raise test and negative left straight leg raise test.        Back:       Right hip: Normal range of motion.      Left hip: Normal range of motion.      Right lower leg: No edema.      Left lower leg: No edema.   Lymphadenopathy:      Cervical: No cervical adenopathy.   Skin:     General: Skin is warm and dry.      Findings: No rash.   Neurological:      Mental Status: She is alert and oriented to person, place, and time.      Gait: Gait abnormal (hemiparetic gait with high steps on right).      Deep Tendon Reflexes:      Reflex Scores:       Patellar reflexes are 2+ on the right side and 2+ on the left side.     Comments: Right hand grasp slightly weaker than left   Psychiatric:         Mood and Affect: Mood normal.         Behavior: Behavior normal.         Thought Content: Thought content normal.         Cognition and Memory: Cognition normal.         Judgment: Judgment normal.         Lab Results   Component Value Date    WBC 5.6 03/08/2022    RBC 4.42 03/08/2022    HGB 13.0 03/08/2022    HCT 40.5 03/08/2022    MCV 92 03/08/2022    MCH 29.4 03/08/2022    MCHC 32.1 03/08/2022     RDW 11.6 (L) 03/08/2022     03/08/2022    NEUTRORELPCT 68 03/08/2022    LYMPHORELPCT 24 03/08/2022    MONORELPCT 6 03/08/2022    EOSRELPCT 1 03/08/2022    BASORELPCT 1 03/08/2022    NEUTROABS 3.8 03/08/2022    LYMPHSABS 1.3 03/08/2022    MONOSABS 0.3 03/08/2022    EOSABS 0.1 03/08/2022    BASOSABS 0.0 03/08/2022    NRBC 0.0 12/11/2020     Lab Results   Component Value Date    GLUCOSE 71 03/08/2022    BUN 17 03/08/2022    CREATININE 0.78 03/08/2022    EGFRIFNONA 106 11/05/2021    EGFRIFAFRI 122 11/05/2021    BCR 22 03/08/2022    K 4.1 03/08/2022    CO2 22 03/08/2022    CALCIUM 9.5 03/08/2022    PROTENTOTREF 6.4 03/08/2022    ALBUMIN 4.4 03/08/2022    LABIL2 2.2 03/08/2022    AST 16 03/08/2022    ALT 21 03/08/2022      Lab Results   Component Value Date    CHLPL 158 03/08/2022    TRIG 147 03/08/2022    HDL 43 03/08/2022    VLDL 26 03/08/2022    LDL 89 03/08/2022     Lab Results   Component Value Date    TSH 2.370 09/07/2021     Lab Results   Component Value Date    COLORU Ania 12/07/2020    CLARITYU Clear 12/07/2020    LEUKOCYTESUR Negative 12/07/2020    BILIRUBINUR Negative 12/07/2020        Assessment/Plan .  Problem List Items Addressed This Visit     History of repair of mitral valve    Overview     Description: [employer] annuloplasty ring, complex MV repair with significant intra-op and post-op complications           Current Assessment & Plan     Stable.  Continue Metoprolol daily.           Left flank pain - Primary    Current Assessment & Plan     Try heat/ice to lower back, whichever feels better.  Try gentle stretching before bed.  Continue Diclofenac twice daily and Baclofen twice daily.           Unintentional weight loss    Current Assessment & Plan     Continue to eat consistent meals.           Anxiety    Current Assessment & Plan     Improving.  Continue Duloxetine daily.  Follow up as scheduled with psychiatry.           Moderate episode of recurrent major depressive disorder (HCC)     Current Assessment & Plan     Patient's depression is recurrent and is moderate without psychosis. Their depression is currently active and the condition is improving with treatment. This will be reassessed in 3 months. F/U as described:patient will continue current medication therapy.  Continue Duloxetine daily.  Follow up as scheduled with psychiatry.                 Return in about 3 months (around 7/11/2022) for Recheck.or sooner if symptoms persist or worsen.  Patient currently takes Baclofen at 0500 and 1000 daily to help with spasticity of right hand during the day; also takes Diclofenac TID; pt notes left flank pain with laying in bed at night; will try ice/heat to left lower back before bed as well as gentle stretching; if symptoms persist or worsen, then will try also taking Baclofen at bedtime.  Will consider physical therapy, declines at this time.  According to PHQ-9, mood has improved; will continue Duloxetine daily; to follow up as scheduled with psychiatry.       COVID-19 Precautions - Patient was compliant in wearing a mask. When I saw the patient, I used appropriate personal protective equipment (PPE) including mask, gloves, and eye shield (standard procedure).  Hand hygiene was completed before and after seeing the patient.

## 2022-04-12 NOTE — ASSESSMENT & PLAN NOTE
Try heat/ice to lower back, whichever feels better.  Try gentle stretching before bed.  Continue Diclofenac twice daily and Baclofen twice daily.

## 2022-04-12 NOTE — ASSESSMENT & PLAN NOTE
Patient's depression is recurrent and is moderate without psychosis. Their depression is currently active and the condition is improving with treatment. This will be reassessed in 3 months. F/U as described:patient will continue current medication therapy.  Continue Duloxetine daily.  Follow up as scheduled with psychiatry.

## 2022-05-05 ENCOUNTER — OFFICE VISIT (OUTPATIENT)
Dept: BEHAVIORAL HEALTH | Facility: CLINIC | Age: 42
End: 2022-05-05

## 2022-05-05 VITALS — HEIGHT: 69 IN | WEIGHT: 140.6 LBS | BODY MASS INDEX: 20.83 KG/M2 | OXYGEN SATURATION: 99 % | HEART RATE: 86 BPM

## 2022-05-05 DIAGNOSIS — G89.29 CHRONIC PAIN OF RIGHT HAND: ICD-10-CM

## 2022-05-05 DIAGNOSIS — M79.641 CHRONIC PAIN OF RIGHT HAND: ICD-10-CM

## 2022-05-05 DIAGNOSIS — F43.23 ADJUSTMENT DISORDER WITH MIXED ANXIETY AND DEPRESSED MOOD: ICD-10-CM

## 2022-05-05 PROBLEM — F45.42 PAIN DISORDER ASSOCIATED WITH PSYCHOLOGICAL FACTORS: Status: ACTIVE | Noted: 2022-05-05

## 2022-05-05 PROCEDURE — 99213 OFFICE O/P EST LOW 20 MIN: CPT | Performed by: NURSE PRACTITIONER

## 2022-05-05 RX ORDER — DULOXETIN HYDROCHLORIDE 60 MG/1
60 CAPSULE, DELAYED RELEASE ORAL EVERY MORNING
Qty: 30 CAPSULE | Refills: 2
Start: 2022-05-05 | End: 2022-06-30 | Stop reason: SDUPTHER

## 2022-05-05 RX ORDER — DULOXETIN HYDROCHLORIDE 30 MG/1
30 CAPSULE, DELAYED RELEASE ORAL NIGHTLY
Qty: 30 CAPSULE | Refills: 2
Start: 2022-05-05 | End: 2022-06-30 | Stop reason: SDUPTHER

## 2022-05-05 NOTE — PROGRESS NOTES
"Subjective   Ania Reyes is a 41 y.o. female who presents today for follow up    Referring Provider:  No referring provider defined for this encounter.    Chief Complaint:  Depression and anxiety, medication check    History of Present Illness:     5/5/22: Patient presents today in office with mother, Haydee.  Patient reports since increase of Cymbalta had 2 weeks of being awake all day then had 2 weeks of sleeping all day.  \"she seems to want to sleep all the time.\"  \"today I am tired.\" Mother reports this past Monday patient slept all day, and woke up an hour before bedtime, and went back to sleep.     Patient has moved in with mother completely, sister is trying to find a house and patient plans to move in with sister.  Patient likes living with mother though does not like the country.  Patient plans to move in with other sister, Bianca, which will be in [place].   Patient plans to move on 5/31/22.     Patient declines therapy due to aphasia.  Patient feels mood has improved though difficult to determine due to increased sleep pattern.      3/31/22: INITIAL EVAL  Patient presents today in office with sister, Nicolette Johnson, with a history of depression and anxiety.  Patient suffered a stroke 2015, first seizure occured 6 yrs after stroke in July or August of 2021, and 4 weeks later had another seizure.  Started on Keppra 9/2021. Currently is followed at Epilepsy clinic and by neurologist.  Patient with right hemiplegia, rle weakness, stability problems occasionally,  aphasia, and limited fine motor to right hand.     Patient dog, Rama, passed away 1 month ago, 2/27/22, from cancer at 5 yrs old.  Patient has been having difficulty coping, as patient did not eat for the first 4 days after, and this past Monday and Tues patient went without eating. Minimal oral intake, drinking big red more than water.  Patient admits to clothes not fitting as well since food deprivation.   Patient admits to anhedonia, " crying often, having difficulty getting out of bed, increased desire to sleep, and lacking motivation.     Stressors: [place], spouse who passed away 2 yrs ago, dog passing away, and losing home. Patient sister reports patient was a victim of cat fishing and now house has to be sold as patient sent out [phone] to a boyfriend online.  Patient reports being with boyfriend for 1 yr and 6 months, he was in the , all communication was through online social media, no telephone or video communication.     Patient appeared to do well with loss of , cat fishing episode, but when Rama was put down patient shut down.      Patient will be moving to [place] with mother in approximately 1 month, house has been sold, and all belongings must be out by April 27.  Sister lives in [facility] And assists with medical care and communication.      Before stroke patient only took vitamin d, has been on Cymbalta since after stroke for pain with nerves to right hand/arm prescribed by neurologist takes in the morning currently. Uncertain of start date, though, sister confirms for several years.           PHQ-9 Depression Screening  PHQ-9 Total Score:   4/11/2022 3 , reassess  7/2022    Little interest or pleasure in doing things?     Feeling down, depressed, or hopeless?     Trouble falling or staying asleep, or sleeping too much?     Feeling tired or having little energy?     Poor appetite or overeating?     Feeling bad about yourself - or that you are a failure or have let yourself or your family down?     Trouble concentrating on things, such as reading the newspaper or watching television?     Moving or speaking so slowly that other people could have noticed? Or the opposite - being so fidgety or restless that you have been moving around a lot more than usual?     Thoughts that you would be better off dead, or of hurting yourself in some way?     PHQ-9 Total Score       CALIN-7     3/31/22 2 , reassess  7/2022    Past  Surgical History:  Past Surgical History:   Procedure Laterality Date   • CARDIAC SURGERY  2015   • CYSTOSCOPY URETEROSCOPY LASER LITHOTRIPSY     • KNEE SURGERY Left    • MITRAL VALVE REPAIR/REPLACEMENT  03/2015    annuloplasty ring, complex MV repair with significant intra-op and post-op complications   • OTHER SURGICAL HISTORY      Selective Arterial Catheterization    • PARATHYROID GLAND SURGERY  2014    Parathyroid resection       Problem List:  Patient Active Problem List   Diagnosis   • Spastic hemiplegia affecting right dominant side (HCC)   • Pain in right hand   • Muscle hypertonicity   • History of stroke   • History of repair of mitral valve   • High risk medication use   • Dyslipidemia   • Allergic rhinitis   • Osteopenia   • Vitamin D deficiency   • History of parathyroid surgery   • Abnormality of gait following cerebrovascular accident (CVA)   • Left flank pain   • Hydroureteronephrosis   • Unintentional weight loss   • Dizziness   • Hypercalcemia   • Seizure (HCC)   • Anxiety   • Moderate episode of recurrent major depressive disorder (HCC)   • Pain disorder associated with psychological factors       Allergy:   No Known Allergies     Discontinued Medications:  Medications Discontinued During This Encounter   Medication Reason   • DULoxetine (CYMBALTA) 60 MG capsule Reorder   • DULoxetine (Cymbalta) 30 MG capsule Reorder       Current Medications:   Current Outpatient Medications   Medication Sig Dispense Refill   • aspirin 81 MG tablet Take 81 mg by mouth Daily.     • atorvastatin (LIPITOR) 20 MG tablet Take 20 mg by mouth Daily.     • baclofen (LIORESAL) 10 MG tablet Take 1 tablet by mouth 2 (Two) Times a Day As Needed for Muscle Spasms. 180 tablet 1   • diazePAM (DIASTAT ACUDIAL) 20 MG rectal kit INSERT 12.5MG INTO THE RECTUM ONCE FOR 1 DOSE FOR GENERALIZED SEIZURE LASTING OVER 3 MINUTES.     • diclofenac (VOLTAREN) 50 MG EC tablet Take 1 tablet by mouth 3 (Three) Times a Day As Needed (pain). 270  tablet 0   • DULoxetine (Cymbalta) 30 MG capsule Take 1 capsule by mouth Every Night for 90 days. Indications: Major Depressive Disorder, Disease of the Peripheral Nerves 30 capsule 2   • DULoxetine (CYMBALTA) 60 MG capsule Take 1 capsule by mouth Every Morning for 90 days. Indications: Major Depressive Disorder, Disease of the Peripheral Nerves 30 capsule 2   • famotidine (PEPCID) 20 MG tablet Take 20 mg by mouth Daily.     • gabapentin (NEURONTIN) 600 MG tablet Take 600 mg by mouth 4 (Four) Times a Day.  5   • levETIRAcetam (KEPPRA) 500 MG tablet Take 500 mg by mouth 2 (Two) Times a Day.     • metoprolol succinate XL (TOPROL-XL) 25 MG 24 hr tablet Take 25 mg by mouth Daily.  3   • Multiple Vitamins-Minerals (MULTIVITAMIN ADULTS PO) Take 1 tablet by mouth Daily.     • potassium chloride (K-DUR) 10 MEQ CR tablet Take 10 mEq by mouth Daily.     • VITAMIN D, CHOLECALCIFEROL, PO Take 1 tablet by mouth Daily.       No current facility-administered medications for this visit.       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 ([facility]) 5/11/2015    Description: 02- - (N) Coronaries - LVEF 55% - Severe prolapse of the anterior & posterior mitral leaflet with severe MR,   • Functional cardiac murmur 4/14/2014   • Gingival enlargement 11/17/2016   • Gum hypertrophy    • Heart failure (HCC) 8/13/2021    NYHA class 3 NYHA class 3 NYHA class 3 NYHA class 3   • High risk medication use    • History of acute sinusitis    • History of dizziness    • History of echocardiogram    • History of hyperparathyroidism    • History  of kidney stones     Bilateral   • History of low back pain    • History of mitral valve repair     [facility] [employer] annuloplasty ring, complex MV repair with significant intra-op and post op complications   • History of nephrolithiasis    • History of stroke     LMCA; right hemiplegia   • History of transesophageal echocardiography ([facility])    • Kidney stone     3mm    • Lactase deficiency    • Left nephrolithiasis 2019   • Methicillin resistant Staphylococcus aureus infection 3/13/2015    No A2K system hx - now +MRSA sputum on 3/13/2015 No A2K system hx - now +MRSA sputum on 3/13/2015 No A2K system hx - now +MRSA sputum on 3/13/2015 No A2K system hx - now +MRSA sputum on 3/13/2015   • Mitral regurgitation    • Mitral valve prolapse    • Muscle hypertonicity    • Need for SBE (subacute bacterial endocarditis) prophylaxis 2019   • Osteopenia    • Pain in right hand    • Pain, wrist joint    • Parathyroid adenoma    • Positive depression screening    • Primary hyperparathyroidism (HCC)    • Right arm pain    • Right hemiplegia ([facility]) 2015   • Seizures ([facility])    • Sinus tachycardia    • Spastic hemiplegia affecting dominant side ([facility])    • Vitamin D deficiency         Past Psychiatric History:  Began Treatment:3/31/22  Diagnoses:Depression and Anxiety  Psychiatrist:Denies  Therapist:Denies  Admission History:Denies  Medication Trials:2015 after stroke for 6 months, unable to recal name  Self Harm: Denies  Suicide Attempts:Denies   Psychosis, Anxiety, Depression: n/a    Substance Abuse History:   Types:Denies all, including illicit  Withdrawal Symptoms:Denies  Longest Period Sober:Not Applicable   AA: Not applicable     Social History:  Martial Status:Single  for 2 yrs   Employed:No disabled  Kids:No  House:Lives in a house with mother ; plans to move in with sister 22   History: Denies  Access to Guns:  no    Social History     Socioeconomic History   • Marital status:     Tobacco Use   • Smoking status: Never Smoker   • Smokeless tobacco: Never Used   Vaping Use   • Vaping Use: Never used   Substance and Sexual Activity   • Alcohol use: Yes     Alcohol/week: 1.0 standard drink     Types: 1 Drinks containing 0.5 oz of alcohol per week     Comment: social drinker   • Drug use: Never   • Sexual activity: Yes     Partners: Male     Birth control/protection: None       Family History:   Suicide Attempts: Denies  Suicide Completions:Denies      Family History   Problem Relation Age of Onset   • Depression Mother    • Anxiety disorder Mother    • Coronary artery disease Mother    • Glaucoma Mother    • Nephrolithiasis Mother    • No Known Problems Father    • Anxiety disorder Sister    • Breast cancer Maternal Aunt         diagnosed in 40s   • Ovarian cancer Maternal Aunt    • Osteoporosis Maternal Aunt    • Coronary artery disease Maternal Grandfather    • Heart attack Maternal Grandfather          at age 54 years   • Colon cancer Maternal Grandmother         diagnosed in her 60's   • Breast cancer Maternal Grandmother    • Ovarian cancer Maternal Grandmother    • Colon cancer Paternal Grandfather    • No Known Problems Other        Developmental History:   Born: [place]  Siblings:1 brother, 1 sister  Childhood: Denies Abuse  High School:Completed  College:almost completed nursing, due to stroke (1/2 yr left)    Mental Status Exam:   Hygiene:   good  Cooperation:  Cooperative  Eye Contact:  Good  Psychomotor Behavior:  Appropriate  Affect:  Appropriate  Mood: euthymic  Speech:  Aphasic  Thought Process:  Goal directed  Thought Content:  Mood congruent  Suicidal:  None  Homicidal:  None  Hallucinations:  None  Delusion:  None  Memory:  Intact  Orientation:  Person, Place, Time and Situation  Reliability:  fair  Insight:  Good  Judgement:  Good  Impulse Control:  Good  Physical/Medical Issues:  Yes Stroke with rt sided weakness, HLD, Sz 6 yrs post stroke, orthostatic  "hypotension, s/p MVR, spastic hemiplegia to rt dominant hand     Review of Systems:  Review of Systems   Constitutional: Positive for fatigue. Negative for chills, diaphoresis and fever.   HENT: Negative for drooling, ear pain, postnasal drip, rhinorrhea and sore throat.    Eyes: Negative for visual disturbance.   Respiratory: Negative for cough, shortness of breath and wheezing.    Cardiovascular: Negative for chest pain, palpitations and leg swelling.   Gastrointestinal: Negative for nausea and vomiting.   Endocrine: Negative for cold intolerance and heat intolerance.   Genitourinary: Negative for difficulty urinating.   Musculoskeletal: Negative for joint swelling and myalgias.   Skin: Negative for rash.   Allergic/Immunologic: Negative for immunocompromised state.   Neurological: Positive for dizziness, seizures and speech difficulty. Negative for numbness and headaches.   Psychiatric/Behavioral: Positive for sleep disturbance. Negative for decreased concentration, hallucinations, self-injury and suicidal ideas. The patient is not nervous/anxious.          Physical Exam:  Physical Exam  Psychiatric:         Attention and Perception: Attention and perception normal.         Mood and Affect: Mood and affect normal.         Speech: Speech is delayed.         Behavior: Behavior normal. Behavior is cooperative.         Thought Content: Thought content normal.         Cognition and Memory: Cognition and memory normal.         Judgment: Judgment normal.      Comments: Aphasic secondary to stroke         Vital Signs:   Pulse 86   Ht 175.3 cm (69\")   Wt 63.8 kg (140 lb 9.6 oz)   SpO2 99%   BMI 20.76 kg/m²      Lab Results:   Office Visit on 03/08/2022   Component Date Value Ref Range Status   • Glucose 03/08/2022 71  65 - 99 mg/dL Final   • BUN 03/08/2022 17  6 - 20 mg/dL Final   • Creatinine 03/08/2022 0.78  0.57 - 1.00 mg/dL Final   • EGFR Result 03/08/2022 99  >59 mL/min/1.73 Final   • BUN/Creatinine Ratio " 03/08/2022 22  9 - 23 Final   • Sodium 03/08/2022 142  134 - 144 mmol/L Final   • Potassium 03/08/2022 4.1  3.5 - 5.2 mmol/L Final   • Chloride 03/08/2022 103  96 - 106 mmol/L Final   • Total CO2 03/08/2022 22  20 - 29 mmol/L Final   • Calcium 03/08/2022 9.5  8.7 - 10.2 mg/dL Final   • Total Protein 03/08/2022 6.4  6.0 - 8.5 g/dL Final   • Albumin 03/08/2022 4.4  3.8 - 4.8 g/dL Final   • Globulin 03/08/2022 2.0  1.5 - 4.5 g/dL Final   • A/G Ratio 03/08/2022 2.2  1.2 - 2.2 Final   • Total Bilirubin 03/08/2022 0.4  0.0 - 1.2 mg/dL Final   • Alkaline Phosphatase 03/08/2022 103  44 - 121 IU/L Final   • AST (SGOT) 03/08/2022 16  0 - 40 IU/L Final   • ALT (SGPT) 03/08/2022 21  0 - 32 IU/L Final   • WBC 03/08/2022 5.6  3.4 - 10.8 x10E3/uL Final   • RBC 03/08/2022 4.42  3.77 - 5.28 x10E6/uL Final   • Hemoglobin 03/08/2022 13.0  11.1 - 15.9 g/dL Final   • Hematocrit 03/08/2022 40.5  34.0 - 46.6 % Final   • MCV 03/08/2022 92  79 - 97 fL Final   • MCH 03/08/2022 29.4  26.6 - 33.0 pg Final   • MCHC 03/08/2022 32.1  31.5 - 35.7 g/dL Final   • RDW 03/08/2022 11.6 (A) 11.7 - 15.4 % Final   • Platelets 03/08/2022 248  150 - 450 x10E3/uL Final   • Neutrophil Rel % 03/08/2022 68  Not Estab. % Final   • Lymphocyte Rel % 03/08/2022 24  Not Estab. % Final   • Monocyte Rel % 03/08/2022 6  Not Estab. % Final   • Eosinophil Rel % 03/08/2022 1  Not Estab. % Final   • Basophil Rel % 03/08/2022 1  Not Estab. % Final   • Neutrophils Absolute 03/08/2022 3.8  1.4 - 7.0 x10E3/uL Final   • Lymphocytes Absolute 03/08/2022 1.3  0.7 - 3.1 x10E3/uL Final   • Monocytes Absolute 03/08/2022 0.3  0.1 - 0.9 x10E3/uL Final   • Eosinophils Absolute 03/08/2022 0.1  0.0 - 0.4 x10E3/uL Final   • Basophils Absolute 03/08/2022 0.0  0.0 - 0.2 x10E3/uL Final   • Immature Granulocyte Rel % 03/08/2022 0  Not Estab. % Final   • Immature Grans Absolute 03/08/2022 0.0  0.0 - 0.1 x10E3/uL Final   • Total Cholesterol 03/08/2022 158  100 - 199 mg/dL Final   • Triglycerides  03/08/2022 147  0 - 149 mg/dL Final   • HDL Cholesterol 03/08/2022 43  >39 mg/dL Final   • VLDL Cholesterol Miles 03/08/2022 26  5 - 40 mg/dL Final   • LDL Chol Calc ([facility]) 03/08/2022 89  0 - 99 mg/dL Final   • LDL/HDL RATIO 03/08/2022 2.1  0.0 - 3.2 ratio Final    Comment:                                     LDL/HDL Ratio                                              Men  Women                                1/2 Avg.Risk  1.0    1.5                                    Avg.Risk  3.6    3.2                                 2X Avg.Risk  6.2    5.0                                 3X Avg.Risk  8.0    6.1         EKG Results:  No orders to display       Imaging Results:  XR Chest PA & Lateral    Result Date: 3/13/2022  No focal pulmonary consolidation. Follow-up as clinical indications persist.  This report was finalized on 3/13/2022 10:34 AM by Dr. Meliton Simental M.D.          Assessment/Plan   Diagnoses and all orders for this visit:    1. Adjustment disorder with mixed anxiety and depressed mood  -     DULoxetine (Cymbalta) 30 MG capsule; Take 1 capsule by mouth Every Night for 90 days. Indications: Major Depressive Disorder, Disease of the Peripheral Nerves  Dispense: 30 capsule; Refill: 2  -     DULoxetine (CYMBALTA) 60 MG capsule; Take 1 capsule by mouth Every Morning for 90 days. Indications: Major Depressive Disorder, Disease of the Peripheral Nerves  Dispense: 30 capsule; Refill: 2    2. Chronic pain of right hand  -     DULoxetine (Cymbalta) 30 MG capsule; Take 1 capsule by mouth Every Night for 90 days. Indications: Major Depressive Disorder, Disease of the Peripheral Nerves  Dispense: 30 capsule; Refill: 2  -     DULoxetine (CYMBALTA) 60 MG capsule; Take 1 capsule by mouth Every Morning for 90 days. Indications: Major Depressive Disorder, Disease of the Peripheral Nerves  Dispense: 30 capsule; Refill: 2        Visit Diagnoses:    ICD-10-CM ICD-9-CM   1. Adjustment disorder with mixed anxiety and depressed mood   F43.23 309.28   2. Chronic pain of right hand  M79.641 729.5    G89.29 338.29       PLAN:  1. Safety: No acute safety concerns  2. Therapy: None offered and encouraged today; patient to contact provider if later decides; would need one in [place] as patient will be relocating at the end of the month  3. Risk Assessment: Risk of self-harm acutely is low.  Risk factors include anxiety disorder, mood disorder, and recent psychosocial stressors (pandemic). Protective factors include no family history, denies access to guns/weapons, no present SI, no history of suicide attempts or self-harm in the past, minimal AODA, healthcare seeking, future orientation, willingness to engage in care.  Risk of self-harm chronically is also low, but could be further elevated in the event of treatment noncompliance and/or AODA.  4. Meds: Change Cymbalta from 90 mg by mouth daily to 60 mg by mouth every morning and 30 mg by mouth nightly  to target chronic pain as previously ordered per neurologist and depression with anxiety.  Patient experiencing increased sedation with one total dose of 90 mg, will split doses. Overall mood improved with increase thus far.  5. Labs: n/a      3/31/22:   Increase Cymbalta from 60 mg to 90 mg by mouth daily in the morning to target chronic pain as previously ordered per neurologist and depression with anxiety. Discussed all risks, benefits, alternatives, and side effects of Duloxetine including but not limited to GI upset, sexual dysfunction, bleeding risk, seizure risk, weight loss, insomnia, diaphoresis, drowsiness, headache, dizziness, fatigue, activation of radha or hypomania, increased fragility fracture risk, hyponatremia, increased BP, hepatotoxicity, ocular effects, withdrawal syndrome following abrupt discontinuation, serotonin syndrome, and activation of suicidal ideation and behavior. Pt educated on the need to practice safe sex while taking this med. Discussed the need for pt to immediately  call the office for any new or worsening symptoms, such as worsening depression; feeling nervous or restless; suicidal thoughts or actions; or other changes changes in mood or behavior, and all other concerns. Pt educated on med compliance and the risks of suddenly stopping this medication or missing doses. Pt verbalized understanding and is agreeable to taking Duloxetine. Addressed all questions and concerns.  Will see patient back in 5 weeks as patient will be moving out of home into mother's home and must have all belongings out of home by 4/27/22.        Patient screened positive for depression based on a PHQ-9 score of 3 on 4/11/2022. Follow-up recommendations include: Prescribed antidepressant medication treatment and Suicide Risk Assessment performed.           TREATMENT PLAN/GOALS: Continue supportive psychotherapy efforts and medications as indicated. Treatment and medication options discussed during today's visit. Patient acknowledged and verbally consented to continue with current treatment plan and was educated on the importance of compliance with treatment and follow-up appointments.    MEDICATION ISSUES:  LAMONTE reviewed as expected.  Discussed medication options and treatment plan of prescribed medication as well as the risks, benefits, and side effects including potential falls, possible impaired driving and metabolic adversities among others. Patient is agreeable to call the office with any worsening of symptoms or onset of side effects. Patient is agreeable to call 911 or go to the nearest ER should he/she begin having SI/HI. No medication side effects or related complaints today.     MEDS ORDERED DURING VISIT:  New Medications Ordered This Visit   Medications   • DULoxetine (Cymbalta) 30 MG capsule     Sig: Take 1 capsule by mouth Every Night for 90 days. Indications: Major Depressive Disorder, Disease of the Peripheral Nerves     Dispense:  30 capsule     Refill:  2     Changed to 30 mg at night  and 60 in the morning; unable to duke 90 mg at once   • DULoxetine (CYMBALTA) 60 MG capsule     Sig: Take 1 capsule by mouth Every Morning for 90 days. Indications: Major Depressive Disorder, Disease of the Peripheral Nerves     Dispense:  30 capsule     Refill:  2     Changing to 60 qam and 30 nightly; no longer to take together       Return in about 4 weeks (around 6/2/2022) for Next scheduled follow up.         I spent 28 minutes caring for Ania on this date of service. This time includes time spent by me in the following activities: preparing for the visit, obtaining and/or reviewing a separately obtained history, performing a medically appropriate examination and/or evaluation, counseling and educating the patient/family/caregiver, ordering medications, tests, or procedures, referring and communicating with other health care professionals and documenting information in the medical record.      This document has been electronically signed by YONG Aradna  May 5, 2022 14:28 EDT      Part of this note may be an electronic transcription/translation of spoken language to printed text using the Dragon Dictation System.

## 2022-05-05 NOTE — PATIENT INSTRUCTIONS
1.  Please return to clinic at your next scheduled visit.  Contact the clinic ([phone]) at least 24 hours prior in the event you need to cancel.  2.  Do no harm to yourself or others.    3.  Avoid alcohol and drugs.    4.  Take all medications as prescribed.  Please contact the clinic with any concerns. If you are in need of medication refills, please call the clinic at [phone].    5. Should you want to get in touch with your provider, YONG Aranda, please utilize [employer] or contact the office ([phone]), and staff will be able to page the provider on call directly.  6.  In the event you have personal crisis, contact the following crisis numbers: Suicide Prevention Hotline 1-704.762.4712; [facility] Helpline 2-696-241-NYEU; [facility] [phone]; text HELLO to 635099; or 836.  7.  Please feel free to contact my Medical Assistant, Latonya, directly at [phone], please leave a voice mail if you do not get an answer, and she will return your call within 24 hrs.      SPECIFIC RECOMMENDATIONS:     1.      Medications discussed at this encounter:                   - Change Cymbalta to 60 mg every morning and 30 mg every night; dose will be , still total dose of 90 mg daily     2.      Psychotherapy recommendations: n/a     3.     Return to clinic: 4 weeks

## 2022-05-31 DIAGNOSIS — M79.641 PAIN IN RIGHT HAND: ICD-10-CM

## 2022-05-31 DIAGNOSIS — G81.10 SPASTIC HEMIPLEGIA AFFECTING DOMINANT SIDE: ICD-10-CM

## 2022-05-31 DIAGNOSIS — M62.89 MUSCLE HYPERTONICITY: ICD-10-CM

## 2022-05-31 RX ORDER — BACLOFEN 10 MG/1
10 TABLET ORAL 2 TIMES DAILY PRN
Qty: 180 TABLET | Refills: 1 | Status: SHIPPED | OUTPATIENT
Start: 2022-05-31 | End: 2022-11-22

## 2022-05-31 NOTE — TELEPHONE ENCOUNTER
Caller: PERCY SABILLON    Relationship: Emergency Contact    Best call back number: [phone]    Requested Prescriptions:   Requested Prescriptions     Pending Prescriptions Disp Refills   • baclofen (LIORESAL) 10 MG tablet 180 tablet 1     Sig: Take 1 tablet by mouth 2 (Two) Times a Day As Needed for Muscle Spasms.        Pharmacy where request should be sent: [facility] [address] [place] [id number] [place] [phone]     Additional details provided by patient: PATIENT HAS 3 PILLS LEFT.     Does the patient have less than a 3 day supply:  [x] Yes  [] No    Ray Mclean Rep   05/31/22 10:56 EDT

## 2022-06-02 ENCOUNTER — OFFICE VISIT (OUTPATIENT)
Dept: BEHAVIORAL HEALTH | Facility: CLINIC | Age: 42
End: 2022-06-02

## 2022-06-02 VITALS
SYSTOLIC BLOOD PRESSURE: 100 MMHG | HEIGHT: 69 IN | DIASTOLIC BLOOD PRESSURE: 62 MMHG | OXYGEN SATURATION: 96 % | WEIGHT: 134 LBS | HEART RATE: 90 BPM | BODY MASS INDEX: 19.85 KG/M2

## 2022-06-02 DIAGNOSIS — F43.23 ADJUSTMENT DISORDER WITH MIXED ANXIETY AND DEPRESSED MOOD: Primary | ICD-10-CM

## 2022-06-02 PROCEDURE — 99213 OFFICE O/P EST LOW 20 MIN: CPT | Performed by: NURSE PRACTITIONER

## 2022-06-02 NOTE — PATIENT INSTRUCTIONS
1.  Please return to clinic at your next scheduled visit.  Contact the clinic ([phone]) at least 24 hours prior in the event you need to cancel.  2.  Do no harm to yourself or others.    3.  Avoid alcohol and drugs.    4.  Take all medications as prescribed.  Please contact the clinic with any concerns. If you are in need of medication refills, please call the clinic at [phone].    5. Should you want to get in touch with your provider, YONG Aranda, please utilize [employer] or contact the office ([phone]), and staff will be able to page the provider on call directly.  6.  In the event you have personal crisis, contact the following crisis numbers: Suicide Prevention Hotline 1-905.811.6246; [facility] Helpline 9-411-334-WYRJ; [facility] [phone]; text HELLO to 535615; or 473.  7.  Please feel free to contact my Medical Assistant, Latonya, directly at [phone], please leave a voice mail if you do not get an answer, and she will return your call within 24 hrs.      SPECIFIC RECOMMENDATIONS:     1.      Medications discussed at this encounter:                   - no changes     2.      Psychotherapy recommendations: n/a     3.     Return to clinic: 3 months

## 2022-06-02 NOTE — PROGRESS NOTES
"Subjective   Ania Reyes is a 41 y.o. female who presents today for follow up    Referring Provider:  No referring provider defined for this encounter.    Chief Complaint:  Depression and anxiety, medication check     History of Present Illness:    6/2/22: Patient presents today in office with sister, Nicloette.   Patient has not eaten for 7 days, however, sister was able to get patient to eat last night and today.  Patient was out of Gabapentin which caused nausea, was able to  medication yesterday.  Apparently there was a discrepancy with the date and pharmacy. Patient will be moving back to [place] on Monday with sister, Bianca.  Patient has noticed clothes are loose, weight trend reviewed.  Due to heart condition patient is not to weigh more than 140 lbs.     Since patient began splitting dose of Cymbalta to 60 mg every morning and 30 mg every night patient no longer sleeping all day,  Patient feels overall mood has improved.  Sister has noticed positive changes, as patient interacts more, no longer just nodding head, now engaging more with others, and laughing.     Patient will be living off [facility] And wishes to do video visits due to length of drive.      5/5/22: Patient presents today in office with mother, Haydee.  Patient reports since increase of Cymbalta had 2 weeks of being awake all day then had 2 weeks of sleeping all day.  \"she seems to want to sleep all the time.\"  \"today I am tired.\" Mother reports this past Monday patient slept all day, and woke up an hour before bedtime, and went back to sleep.     Patient has moved in with mother completely, sister is trying to find a house and patient plans to move in with sister.  Patient likes living with mother though does not like the country.  Patient plans to move in with other sister, Bianca, which will be in [place].   Patient plans to move on 5/31/22.     Patient declines therapy due to aphasia.  Patient feels mood has improved " though difficult to determine due to increased sleep pattern.      3/31/22: INITIAL EVAL  Patient presents today in office with sister, Nicolette Johnson, with a history of depression and anxiety.  Patient suffered a stroke 2015, first seizure occured 6 yrs after stroke in July or August of 2021, and 4 weeks later had another seizure.  Started on Keppra 9/2021. Currently is followed at Ep[facility]psy clinic and by neurologist.  Patient with right hemiplegia, rle weakness, stability problems occasionally,  aphasia, and limited fine motor to right hand.     Patient dog, Rama, passed away 1 month ago, 2/27/22, from cancer at 5 yrs old.  Patient has been having difficulty coping, as patient did not eat for the first 4 days after, and this past Monday and Tues patient went without eating. Minimal oral intake, drinking big red more than water.  Patient admits to clothes not fitting as well since food deprivation.   Patient admits to anhedonia, crying often, having difficulty getting out of bed, increased desire to sleep, and lacking motivation.     Stressors: [place], spouse who passed away 2 yrs ago, dog passing away, and losing home. Patient sister reports patient was a victim of cat fishing and now house has to be sold as patient sent out [phone] to a boyfriend online.  Patient reports being with boyfriend for 1 yr and 6 months, he was in the , all communication was through online social media, no telephone or video communication.     Patient appeared to do well with loss of , cat fishing episode, but when Rama was put down patient shut down.      Patient will be moving to [place] with mother in approximately 1 month, house has been sold, and all belongings must be out by April 27.  Sister lives in [place], [place]. And assists with medical care and communication.      Before stroke patient only took vitamin d, has been on Cymbalta since after stroke for pain with nerves to right hand/arm prescribed by  neurologist takes in the morning currently. Uncertain of start date, though, sister confirms for several years.           PHQ-9 Depression Screening  PHQ-9 Total Score:   4/11/2022 3 , reassess  7/2022    Little interest or pleasure in doing things?     Feeling down, depressed, or hopeless?     Trouble falling or staying asleep, or sleeping too much?     Feeling tired or having little energy?     Poor appetite or overeating?     Feeling bad about yourself - or that you are a failure or have let yourself or your family down?     Trouble concentrating on things, such as reading the newspaper or watching television?     Moving or speaking so slowly that other people could have noticed? Or the opposite - being so fidgety or restless that you have been moving around a lot more than usual?     Thoughts that you would be better off dead, or of hurting yourself in some way?     PHQ-9 Total Score       CALIN-7     3/31/22 2 , reassess  7/2022    Past Surgical History:  Past Surgical History:   Procedure Laterality Date   • CARDIAC SURGERY  2015   • CYSTOSCOPY URETEROSCOPY LASER LITHOTRIPSY     • KNEE SURGERY Left    • MITRAL VALVE REPAIR/REPLACEMENT  03/2015    annuloplasty ring, complex MV repair with significant intra-op and post-op complications   • OTHER SURGICAL HISTORY      Selective Arterial Catheterization    • PARATHYROID GLAND SURGERY  2014    Parathyroid resection       Problem List:  Patient Active Problem List   Diagnosis   • Spastic hemiplegia affecting right dominant side (HCC)   • Pain in right hand   • Muscle hypertonicity   • History of stroke   • History of repair of mitral valve   • High risk medication use   • Dyslipidemia   • Allergic rhinitis   • Osteopenia   • Vitamin D deficiency   • History of parathyroid surgery   • Abnormality of gait following cerebrovascular accident (CVA)   • Left flank pain   • Hydroureteronephrosis   • Unintentional weight loss   • Dizziness   • Hypercalcemia   • Seizure (HCC)    • Anxiety   • Moderate episode of recurrent major depressive disorder (HCC)   • Pain disorder associated with psychological factors       Allergy:   No Known Allergies     Discontinued Medications:  There are no discontinued medications.    Current Medications:   Current Outpatient Medications   Medication Sig Dispense Refill   • aspirin 81 MG tablet Take 81 mg by mouth Daily.     • atorvastatin (LIPITOR) 20 MG tablet Take 20 mg by mouth Daily.     • baclofen (LIORESAL) 10 MG tablet Take 1 tablet by mouth 2 (Two) Times a Day As Needed for Muscle Spasms. 180 tablet 1   • diazePAM (DIASTAT ACUDIAL) 20 MG rectal kit INSERT 12.5MG INTO THE RECTUM ONCE FOR 1 DOSE FOR GENERALIZED SEIZURE LASTING OVER 3 MINUTES.     • diclofenac (VOLTAREN) 50 MG EC tablet Take 1 tablet by mouth 3 (Three) Times a Day As Needed (pain). 270 tablet 0   • DULoxetine (Cymbalta) 30 MG capsule Take 1 capsule by mouth Every Night for 90 days. Indications: Major Depressive Disorder, Disease of the Peripheral Nerves 30 capsule 2   • DULoxetine (CYMBALTA) 60 MG capsule Take 1 capsule by mouth Every Morning for 90 days. Indications: Major Depressive Disorder, Disease of the Peripheral Nerves 30 capsule 2   • famotidine (PEPCID) 20 MG tablet Take 20 mg by mouth Daily.     • gabapentin (NEURONTIN) 600 MG tablet Take 600 mg by mouth 4 (Four) Times a Day.  5   • levETIRAcetam (KEPPRA) 500 MG tablet Take 500 mg by mouth 2 (Two) Times a Day.     • metoprolol succinate XL (TOPROL-XL) 25 MG 24 hr tablet Take 25 mg by mouth Daily.  3   • Multiple Vitamins-Minerals (MULTIVITAMIN ADULTS PO) Take 1 tablet by mouth Daily.     • potassium chloride (K-DUR) 10 MEQ CR tablet Take 10 mEq by mouth Daily.     • VITAMIN D, CHOLECALCIFEROL, PO Take 1 tablet by mouth Daily.       No current facility-administered medications for this visit.       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 ([facility]) 5/11/2015    Description: 02- - (N) Coronaries - LVEF 55% - Severe prolapse of the anterior & posterior mitral leaflet with severe MR,   • Functional cardiac murmur 4/14/2014   • Gingival enlargement 11/17/2016   • Gum hypertrophy    • Heart failure ([facility]) 8/13/2021    NYHA class 3 NYHA class 3 NYHA class 3 NYHA class 3   • High risk medication use    • History of acute sinusitis    • History of dizziness    • History of echocardiogram    • History of hyperparathyroidism    • History of kidney stones     Bilateral   • History of low back pain    • History of mitral valve repair     [employer] annuloplasty ring, complex MV repair with significant intra-op and post op complications   • History of nephrolithiasis    • History of stroke     LMCA; right hemiplegia   • History of transesophageal echocardiography ([facility])    • Kidney stone     3mm    • Lactase deficiency    • Left nephrolithiasis 8/12/2019   • Methicillin resistant Staphylococcus aureus infection 3/13/2015    No A2K system hx - now +MRSA sputum on 3/13/2015 No A2K system hx - now +MRSA sputum on 3/13/2015 No A2K system hx - now +MRSA sputum on 3/13/2015 No A2K system hx - now +MRSA sputum on 3/13/2015   • Mitral regurgitation    • Mitral valve prolapse    • Muscle hypertonicity    • Need for SBE (subacute bacterial endocarditis) prophylaxis 8/12/2019   • Osteopenia    • Pain in right hand    • Pain, wrist joint    • Parathyroid adenoma    • Positive depression screening    • Primary hyperparathyroidism (HCC)    • Right arm pain    • Right hemiplegia ([facility]) 5/28/2015   • Seizures ([facility])    • Sinus tachycardia    •  Spastic hemiplegia affecting dominant side (HCC)    • Vitamin D deficiency         Past Psychiatric History:  Began Treatment:3/31/22  Diagnoses:Depression and Anxiety  Psychiatrist:Denies  Therapist:Denies  Admission History:Denies  Medication Trials:2015 after stroke for 6 months, unable to recal name  Self Harm: Denies  Suicide Attempts:Denies   Psychosis, Anxiety, Depression: n/a    Substance Abuse History:   Types:Denies all, including illicit  Withdrawal Symptoms:Denies  Longest Period Sober:Not Applicable   AA: Not applicable     Social History:  Martial Status:Single  for 2 yrs   Employed:No disabled  Kids:No  House:Lives in a house with mother ; plans to move in with sister 2022   History: Denies  Access to Guns:  no    Social History     Socioeconomic History   • Marital status:    Tobacco Use   • Smoking status: Never Smoker   • Smokeless tobacco: Never Used   Vaping Use   • Vaping Use: Never used   Substance and Sexual Activity   • Alcohol use: Yes     Alcohol/week: 1.0 standard drink     Types: 1 Drinks containing 0.5 oz of alcohol per week     Comment: social drinker   • Drug use: Never   • Sexual activity: Yes     Partners: Male     Birth control/protection: None       Family History:   Suicide Attempts: Denies  Suicide Completions:Denies      Family History   Problem Relation Age of Onset   • Depression Mother    • Anxiety disorder Mother    • Coronary artery disease Mother    • Glaucoma Mother    • Nephrolithiasis Mother    • No Known Problems Father    • Anxiety disorder Sister    • Breast cancer Maternal Aunt         diagnosed in 40s   • Ovarian cancer Maternal Aunt    • Osteoporosis Maternal Aunt    • Coronary artery disease Maternal Grandfather    • Heart attack Maternal Grandfather          at age 54 years   • Colon cancer Maternal Grandmother         diagnosed in her 60's   • Breast cancer Maternal Grandmother    • Ovarian cancer Maternal Grandmother     • Colon cancer Paternal Grandfather    • No Known Problems Other        Developmental History:   Born: [place]  Siblings:1 brother, 1 sister  Childhood: Denies Abuse  High School:Completed  College:almost completed nursing, due to stroke (1/2 yr left)    Mental Status Exam:   Hygiene:   good  Cooperation:  Cooperative  Eye Contact:  Good  Psychomotor Behavior:  Appropriate  Affect:  Appropriate  Mood: euthymic  Speech:  Aphasic  Thought Process:  Goal directed  Thought Content:  Mood congruent  Suicidal:  None  Homicidal:  None  Hallucinations:  None  Delusion:  None  Memory:  Intact  Orientation:  Person, Place, Time and Situation  Reliability:  fair  Insight:  Good  Judgement:  Good  Impulse Control:  Good  Physical/Medical Issues:  Yes Stroke with rt sided weakness, HLD, Sz 6 yrs post stroke, orthostatic hypotension, s/p MVR, spastic hemiplegia to rt dominant hand     Review of Systems:  Review of Systems   Constitutional: Positive for appetite change and fatigue. Negative for chills, diaphoresis and fever.   HENT: Negative for drooling, ear pain, postnasal drip, rhinorrhea and sore throat.    Eyes: Negative for visual disturbance.   Respiratory: Negative for cough, shortness of breath and wheezing.    Cardiovascular: Negative for chest pain, palpitations and leg swelling.   Gastrointestinal: Positive for nausea. Negative for vomiting.   Endocrine: Negative for cold intolerance and heat intolerance.   Genitourinary: Negative for difficulty urinating.   Musculoskeletal: Negative for joint swelling and myalgias.   Skin: Negative for rash.   Allergic/Immunologic: Negative for immunocompromised state.   Neurological: Positive for dizziness and speech difficulty. Negative for seizures, numbness and headaches.   Psychiatric/Behavioral: Negative for decreased concentration, hallucinations, self-injury, sleep disturbance and suicidal ideas. The patient is not nervous/anxious.          Physical Exam:  Physical  "Exam  Psychiatric:         Attention and Perception: Attention and perception normal.         Mood and Affect: Mood and affect normal.         Speech: Speech is delayed.         Behavior: Behavior normal. Behavior is cooperative.         Thought Content: Thought content normal.         Cognition and Memory: Cognition and memory normal.         Judgment: Judgment normal.      Comments: Aphasic secondary to stroke         Vital Signs:   /62   Pulse 90   Ht 175.3 cm (69\")   Wt 60.8 kg (134 lb)   SpO2 96%   BMI 19.79 kg/m²      Lab Results:   Office Visit on 03/08/2022   Component Date Value Ref Range Status   • Glucose 03/08/2022 71  65 - 99 mg/dL Final   • BUN 03/08/2022 17  6 - 20 mg/dL Final   • Creatinine 03/08/2022 0.78  0.57 - 1.00 mg/dL Final   • EGFR Result 03/08/2022 99  >59 mL/min/1.73 Final   • BUN/Creatinine Ratio 03/08/2022 22  9 - 23 Final   • Sodium 03/08/2022 142  134 - 144 mmol/L Final   • Potassium 03/08/2022 4.1  3.5 - 5.2 mmol/L Final   • Chloride 03/08/2022 103  96 - 106 mmol/L Final   • Total CO2 03/08/2022 22  20 - 29 mmol/L Final   • Calcium 03/08/2022 9.5  8.7 - 10.2 mg/dL Final   • Total Protein 03/08/2022 6.4  6.0 - 8.5 g/dL Final   • Albumin 03/08/2022 4.4  3.8 - 4.8 g/dL Final   • Globulin 03/08/2022 2.0  1.5 - 4.5 g/dL Final   • A/G Ratio 03/08/2022 2.2  1.2 - 2.2 Final   • Total Bilirubin 03/08/2022 0.4  0.0 - 1.2 mg/dL Final   • Alkaline Phosphatase 03/08/2022 103  44 - 121 IU/L Final   • AST (SGOT) 03/08/2022 16  0 - 40 IU/L Final   • ALT (SGPT) 03/08/2022 21  0 - 32 IU/L Final   • WBC 03/08/2022 5.6  3.4 - 10.8 x10E3/uL Final   • RBC 03/08/2022 4.42  3.77 - 5.28 x10E6/uL Final   • Hemoglobin 03/08/2022 13.0  11.1 - 15.9 g/dL Final   • Hematocrit 03/08/2022 40.5  34.0 - 46.6 % Final   • MCV 03/08/2022 92  79 - 97 fL Final   • MCH 03/08/2022 29.4  26.6 - 33.0 pg Final   • MCHC 03/08/2022 32.1  31.5 - 35.7 g/dL Final   • RDW 03/08/2022 11.6 (A) 11.7 - 15.4 % Final   • Platelets " 03/08/2022 248  150 - 450 x10E3/uL Final   • Neutrophil Rel % 03/08/2022 68  Not Estab. % Final   • Lymphocyte Rel % 03/08/2022 24  Not Estab. % Final   • Monocyte Rel % 03/08/2022 6  Not Estab. % Final   • Eosinophil Rel % 03/08/2022 1  Not Estab. % Final   • Basophil Rel % 03/08/2022 1  Not Estab. % Final   • Neutrophils Absolute 03/08/2022 3.8  1.4 - 7.0 x10E3/uL Final   • Lymphocytes Absolute 03/08/2022 1.3  0.7 - 3.1 x10E3/uL Final   • Monocytes Absolute 03/08/2022 0.3  0.1 - 0.9 x10E3/uL Final   • Eosinophils Absolute 03/08/2022 0.1  0.0 - 0.4 x10E3/uL Final   • Basophils Absolute 03/08/2022 0.0  0.0 - 0.2 x10E3/uL Final   • Immature Granulocyte Rel % 03/08/2022 0  Not Estab. % Final   • Immature Grans Absolute 03/08/2022 0.0  0.0 - 0.1 x10E3/uL Final   • Total Cholesterol 03/08/2022 158  100 - 199 mg/dL Final   • Triglycerides 03/08/2022 147  0 - 149 mg/dL Final   • HDL Cholesterol 03/08/2022 43  >39 mg/dL Final   • VLDL Cholesterol Miles 03/08/2022 26  5 - 40 mg/dL Final   • LDL Chol Calc ([facility]) 03/08/2022 89  0 - 99 mg/dL Final   • LDL/HDL RATIO 03/08/2022 2.1  0.0 - 3.2 ratio Final    Comment:                                     LDL/HDL Ratio                                              Men  Women                                1/2 Avg.Risk  1.0    1.5                                    Avg.Risk  3.6    3.2                                 2X Avg.Risk  6.2    5.0                                 3X Avg.Risk  8.0    6.1         EKG Results:  No orders to display       Imaging Results:  XR Chest PA & Lateral    Result Date: 3/13/2022  No focal pulmonary consolidation. Follow-up as clinical indications persist.  This report was finalized on 3/13/2022 10:34 AM by Dr. Meliton Simental M.D.          Assessment & Plan   Diagnoses and all orders for this visit:    1. Adjustment disorder with mixed anxiety and depressed mood (Primary)        Visit Diagnoses:    ICD-10-CM ICD-9-CM   1. Adjustment disorder with mixed  anxiety and depressed mood  F43.23 309.28       PLAN:  1. Safety: No acute safety concerns  2. Therapy: Declines   3. Risk Assessment: Risk of self-harm acutely is low.  Risk factors include anxiety disorder, mood disorder, and recent psychosocial stressors (pandemic). Protective factors include no family history, denies access to guns/weapons, no present SI, no history of suicide attempts or self-harm in the past, minimal AODA, healthcare seeking, future orientation, willingness to engage in care.  Risk of self-harm chronically is also low, but could be further elevated in the event of treatment noncompliance and/or AODA.  4. Meds: Continue Cymbalta 60 mg by mouth daily every morning and 30 mg by mouth nightly  to target chronic pain as previously ordered per neurologist and depression with anxiety.    5. Labs: n/a    Patient doing well with current medication regimen, will be moving to [place] with sister soon.  Discussed option for telehealth video visits and patient agrees with plan, informed patient and sister of need to have an in person visit every 12 months per medicare guidelines.     5/5/22:   Change Cymbalta from 90 mg by mouth daily to 60 mg by mouth every morning and 30 mg by mouth nightly  to target chronic pain as previously ordered per neurologist and depression with anxiety.  Patient experiencing increased sedation with one total dose of 90 mg, will split doses. Overall mood improved with increase thus far.  Therapy-offered and encouraged today; patient to contact provider if later decides; would need one in [place] as patient will be relocating at the end of the month    3/31/22:   Increase Cymbalta from 60 mg to 90 mg by mouth daily in the morning to target chronic pain as previously ordered per neurologist and depression with anxiety. Discussed all risks, benefits, alternatives, and side effects of Duloxetine including but not limited to GI upset, sexual dysfunction, bleeding risk, seizure  risk, weight loss, insomnia, diaphoresis, drowsiness, headache, dizziness, fatigue, activation of radha or hypomania, increased fragility fracture risk, hyponatremia, increased BP, hepatotoxicity, ocular effects, withdrawal syndrome following abrupt discontinuation, serotonin syndrome, and activation of suicidal ideation and behavior. Pt educated on the need to practice safe sex while taking this med. Discussed the need for pt to immediately call the office for any new or worsening symptoms, such as worsening depression; feeling nervous or restless; suicidal thoughts or actions; or other changes changes in mood or behavior, and all other concerns. Pt educated on med compliance and the risks of suddenly stopping this medication or missing doses. Pt verbalized understanding and is agreeable to taking Duloxetine. Addressed all questions and concerns.  Will see patient back in 5 weeks as patient will be moving out of home into mother's home and must have all belongings out of home by 4/27/22.        Patient screened positive for depression based on a PHQ-9 score of 3 on 4/11/2022. Follow-up recommendations include: Prescribed antidepressant medication treatment and Suicide Risk Assessment performed.           TREATMENT PLAN/GOALS: Continue supportive psychotherapy efforts and medications as indicated. Treatment and medication options discussed during today's visit. Patient acknowledged and verbally consented to continue with current treatment plan and was educated on the importance of compliance with treatment and follow-up appointments.    MEDICATION ISSUES:  LAMONTE reviewed as expected.  Discussed medication options and treatment plan of prescribed medication as well as the risks, benefits, and side effects including potential falls, possible impaired driving and metabolic adversities among others. Patient is agreeable to call the office with any worsening of symptoms or onset of side effects. Patient is agreeable to  call 911 or go to the nearest ER should he/she begin having SI/HI. No medication side effects or related complaints today.     MEDS ORDERED DURING VISIT:  No orders of the defined types were placed in this encounter.      Return in about 3 months (around 9/2/2022) for Video visit.         I spent 25 minutes caring for Ania on this date of service. This time includes time spent by me in the following activities: preparing for the visit, performing a medically appropriate examination and/or evaluation, counseling and educating the patient/family/caregiver, referring and communicating with other health care professionals and documenting information in the medical record.      This document has been electronically signed by YONG Aranda  June 2, 2022 14:23 EDT      Part of this note may be an electronic transcription/translation of spoken language to printed text using the Dragon Dictation System.

## 2022-06-30 DIAGNOSIS — G89.29 CHRONIC PAIN OF RIGHT HAND: ICD-10-CM

## 2022-06-30 DIAGNOSIS — F43.23 ADJUSTMENT DISORDER WITH MIXED ANXIETY AND DEPRESSED MOOD: ICD-10-CM

## 2022-06-30 DIAGNOSIS — M79.641 CHRONIC PAIN OF RIGHT HAND: ICD-10-CM

## 2022-06-30 RX ORDER — DULOXETIN HYDROCHLORIDE 60 MG/1
60 CAPSULE, DELAYED RELEASE ORAL EVERY MORNING
Qty: 30 CAPSULE | Refills: 2
Start: 2022-06-30 | End: 2022-09-27

## 2022-06-30 RX ORDER — DULOXETIN HYDROCHLORIDE 30 MG/1
30 CAPSULE, DELAYED RELEASE ORAL NIGHTLY
Qty: 30 CAPSULE | Refills: 2
Start: 2022-06-30 | End: 2022-07-06 | Stop reason: SDUPTHER

## 2022-06-30 NOTE — TELEPHONE ENCOUNTER
Pt sister called in medication refill request for Duloxetine 30mg and 60mg. Pt and sister would like these prescriptions to be transferred to [facility] pharmacy at [address] [facility] in [place], [place]. PT has upcoming appt on 09/02/2022. Medication orders pended with updated pharmacy and location.

## 2022-07-04 DIAGNOSIS — G81.10 SPASTIC HEMIPLEGIA AFFECTING DOMINANT SIDE: ICD-10-CM

## 2022-07-04 DIAGNOSIS — M79.641 PAIN IN RIGHT HAND: ICD-10-CM

## 2022-07-04 DIAGNOSIS — M62.89 MUSCLE HYPERTONICITY: ICD-10-CM

## 2022-07-06 DIAGNOSIS — G89.29 CHRONIC PAIN OF RIGHT HAND: ICD-10-CM

## 2022-07-06 DIAGNOSIS — F43.23 ADJUSTMENT DISORDER WITH MIXED ANXIETY AND DEPRESSED MOOD: ICD-10-CM

## 2022-07-06 DIAGNOSIS — M79.641 CHRONIC PAIN OF RIGHT HAND: ICD-10-CM

## 2022-07-06 RX ORDER — DULOXETIN HYDROCHLORIDE 30 MG/1
30 CAPSULE, DELAYED RELEASE ORAL NIGHTLY
Qty: 30 CAPSULE | Refills: 2 | Status: SHIPPED | OUTPATIENT
Start: 2022-07-06 | End: 2022-09-27 | Stop reason: SDUPTHER

## 2022-07-06 NOTE — TELEPHONE ENCOUNTER
Previously reordered on 6/30/22 as a No print, which is why medication was not filled, changed to dispense today.

## 2022-07-06 NOTE — TELEPHONE ENCOUNTER
Pt sister and pharmacy called saying that the script was not sent in, please resend script, med pending

## 2022-07-25 ENCOUNTER — OFFICE VISIT (OUTPATIENT)
Dept: FAMILY MEDICINE CLINIC | Facility: CLINIC | Age: 42
End: 2022-07-25

## 2022-07-25 VITALS
WEIGHT: 139.4 LBS | HEIGHT: 69 IN | OXYGEN SATURATION: 100 % | HEART RATE: 82 BPM | TEMPERATURE: 97.7 F | DIASTOLIC BLOOD PRESSURE: 70 MMHG | SYSTOLIC BLOOD PRESSURE: 120 MMHG | BODY MASS INDEX: 20.65 KG/M2

## 2022-07-25 DIAGNOSIS — R56.9 SEIZURE: ICD-10-CM

## 2022-07-25 DIAGNOSIS — M54.50 ACUTE BILATERAL LOW BACK PAIN WITHOUT SCIATICA: Primary | ICD-10-CM

## 2022-07-25 DIAGNOSIS — E78.5 DYSLIPIDEMIA: ICD-10-CM

## 2022-07-25 DIAGNOSIS — M62.89 MUSCLE HYPERTONICITY: ICD-10-CM

## 2022-07-25 DIAGNOSIS — M79.641 PAIN IN RIGHT HAND: ICD-10-CM

## 2022-07-25 DIAGNOSIS — F33.1 MODERATE EPISODE OF RECURRENT MAJOR DEPRESSIVE DISORDER: ICD-10-CM

## 2022-07-25 DIAGNOSIS — R63.4 UNINTENTIONAL WEIGHT LOSS: ICD-10-CM

## 2022-07-25 DIAGNOSIS — Z98.890 HISTORY OF REPAIR OF MITRAL VALVE: ICD-10-CM

## 2022-07-25 DIAGNOSIS — F41.9 ANXIETY: ICD-10-CM

## 2022-07-25 LAB
BILIRUB BLD-MCNC: NEGATIVE MG/DL
CLARITY, POC: CLEAR
COLOR UR: YELLOW
EXPIRATION DATE: NORMAL
GLUCOSE UR STRIP-MCNC: NEGATIVE MG/DL
KETONES UR QL: NEGATIVE
LEUKOCYTE EST, POC: NEGATIVE
Lab: NORMAL
NITRITE UR-MCNC: NEGATIVE MG/ML
PH UR: 6 [PH] (ref 5–8)
PROT UR STRIP-MCNC: NEGATIVE MG/DL
RBC # UR STRIP: NEGATIVE /UL
SP GR UR: 1.02 (ref 1–1.03)
UROBILINOGEN UR QL: NORMAL

## 2022-07-25 PROCEDURE — 99214 OFFICE O/P EST MOD 30 MIN: CPT | Performed by: NURSE PRACTITIONER

## 2022-07-25 PROCEDURE — 81003 URINALYSIS AUTO W/O SCOPE: CPT | Performed by: NURSE PRACTITIONER

## 2022-07-25 NOTE — PROGRESS NOTES
Subjective   Ania Reyes is a 41 y.o. female.     Chief Complaint   Patient presents with   • Back Pain     2 weeks lower        History of Present Illness   Patient presents with c/o lower back pain x2 weeks; has discomfort across lower back; no radiation of pain down legs; no weakness in legs; no dysuria; no urinary frequency; no fever; no numbness or tingling; has tried Ibuprofen, but does not help much; also takes Diclofenac twice daily; rates pain as 6-7 on scale of 1-10; no known injury; no new activity or repetitive motion.    FU S/P MVR: takes Metoprolol daily; does not typically monitor BP; no headaches; no change in occasional dizziness with position changes, particularly if gets up too fast; no swelling; sees Dr. Mccurdy cardiology; has annual follow up in December.     F/U dyslipidemia: takes Atorvastatin daily; no myalgias; fasting today.     F/U right hand pain/muscle hypertonicity: takes Baclofen twice daily and Diclofenac twice daily; has been taking Baclofen in early morning and again around 1000 to prevent hand drawing up around 0930 and has helped; also takes Gabapentin QID; sees pain management; gets Botox injections in right hand and foot per Dr. Trammell at [facility] and helps; has not been doing home exercise.     F/U seizures/spastic hemiplegia/history of stroke: takes Keppra twice daily; sees neurology Dr. Ortega and goes to Epilepsy clinic; no seizures since has been on medication.    F/U depression/anxiety: takes Duloxetine twice daily; takes 60 mg at 1 p.m. and 30 mg at bedtime and helps; sees psychiatry and helps; no SI/HI.     F/U weight loss: weight has improved; has been eating better.      Father was present during the history-taking and subsequent discussion (and for part of the physical exam) with this patient.  Patient agrees to the presence of the individual during this visit.    The following portions of the patient's history were reviewed and updated as appropriate:  allergies, current medications, past family history, past medical history, past social history, past surgical history and problem list.    Current Outpatient Medications on File Prior to Visit   Medication Sig   • aspirin 81 MG tablet Take 81 mg by mouth Daily.   • atorvastatin (LIPITOR) 20 MG tablet Take 20 mg by mouth Daily.   • baclofen (LIORESAL) 10 MG tablet Take 1 tablet by mouth 2 (Two) Times a Day As Needed for Muscle Spasms.   • diazePAM (DIASTAT ACUDIAL) 20 MG rectal kit INSERT 12.5MG INTO THE RECTUM ONCE FOR 1 DOSE FOR GENERALIZED SEIZURE LASTING OVER 3 MINUTES.   • diclofenac (VOLTAREN) 50 MG EC tablet TAKE 1 TABLET BY MOUTH THREE TIMES DAILY AS NEEDED FOR PAIN   • DULoxetine (Cymbalta) 30 MG capsule Take 1 capsule by mouth Every Night for 90 days. Indications: Major Depressive Disorder, Disease of the Peripheral Nerves   • DULoxetine (CYMBALTA) 60 MG capsule Take 1 capsule by mouth Every Morning for 90 days. Indications: Major Depressive Disorder, Disease of the Peripheral Nerves   • famotidine (PEPCID) 20 MG tablet Take 20 mg by mouth Daily.   • gabapentin (NEURONTIN) 600 MG tablet Take 600 mg by mouth 4 (Four) Times a Day.   • levETIRAcetam (KEPPRA) 500 MG tablet Take 500 mg by mouth 2 (Two) Times a Day.   • metoprolol succinate XL (TOPROL-XL) 25 MG 24 hr tablet Take 25 mg by mouth Daily.   • Multiple Vitamins-Minerals (MULTIVITAMIN ADULTS PO) Take 1 tablet by mouth Daily.   • potassium chloride (K-DUR) 10 MEQ CR tablet Take 10 mEq by mouth Daily.   • VITAMIN D, CHOLECALCIFEROL, PO Take 1 tablet by mouth Daily.     No current facility-administered medications on file prior to visit.        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 ([facility]) 5/11/2015    Description: 02- - (N) Coronaries - LVEF 55% - Severe prolapse of the anterior & posterior mitral leaflet with severe MR,   • Functional cardiac murmur 4/14/2014   • Gingival enlargement 11/17/2016   • Gum hypertrophy    • Heart failure ([facility]) 8/13/2021    NYHA class 3 NYHA class 3 NYHA class 3 NYHA class 3   • High risk medication use    • History of acute sinusitis    • History of dizziness    • History of echocardiogram    • History of hyperparathyroidism    • History of kidney stones     Bilateral   • History of low back pain    • History of mitral valve repair     [employer] annuloplasty ring, complex MV repair with significant intra-op and post op complications   • History of nephrolithiasis    • History of stroke     LMCA; right hemiplegia   • History of transesophageal echocardiography ([facility])    • Kidney stone     3mm    • Lactase deficiency    • Left nephrolithiasis 8/12/2019   • Methicillin resistant Staphylococcus aureus infection 3/13/2015    No A2K system hx - now +MRSA sputum on 3/13/2015 No A2K system hx - now +MRSA sputum on 3/13/2015 No A2K system hx - now +MRSA sputum on 3/13/2015 No A2K system hx - now +MRSA sputum on 3/13/2015   • Mitral regurgitation    • Mitral valve prolapse    • Muscle hypertonicity    • Need for SBE (subacute bacterial endocarditis) prophylaxis 8/12/2019   • Osteopenia    • Pain in right hand    • Pain, wrist joint    • Parathyroid adenoma    • Positive depression screening    • Primary hyperparathyroidism ([facility])    • Right arm pain    • Right hemiplegia ([facility]) 5/28/2015   • Seizures ([facility])    • Sinus tachycardia    • Spastic hemiplegia affecting dominant side ([facility])    • Vitamin D deficiency        Past Surgical History:   Procedure Laterality Date   • CARDIAC SURGERY  2015   • CYSTOSCOPY URETEROSCOPY LASER LITHOTRIPSY     • KNEE  SURGERY Left    • MITRAL VALVE REPAIR/REPLACEMENT  2015    annuloplasty ring, complex MV repair with significant intra-op and post-op complications   • OTHER SURGICAL HISTORY      Selective Arterial Catheterization    • PARATHYROID GLAND SURGERY  2014    Parathyroid resection       Family History   Problem Relation Age of Onset   • Depression Mother    • Anxiety disorder Mother    • Coronary artery disease Mother    • Glaucoma Mother    • Nephrolithiasis Mother    • No Known Problems Father    • Anxiety disorder Sister    • Breast cancer Maternal Aunt         diagnosed in 40s   • Ovarian cancer Maternal Aunt    • Osteoporosis Maternal Aunt    • Coronary artery disease Maternal Grandfather    • Heart attack Maternal Grandfather          at age 54 years   • Colon cancer Maternal Grandmother         diagnosed in her 60's   • Breast cancer Maternal Grandmother    • Ovarian cancer Maternal Grandmother    • Colon cancer Paternal Grandfather    • No Known Problems Other        Social History     Socioeconomic History   • Marital status:    Tobacco Use   • Smoking status: Never Smoker   • Smokeless tobacco: Never Used   Vaping Use   • Vaping Use: Never used   Substance and Sexual Activity   • Alcohol use: Yes     Alcohol/week: 1.0 standard drink     Types: 1 Drinks containing 0.5 oz of alcohol per week     Comment: social drinker   • Drug use: Never   • Sexual activity: Yes     Partners: Male     Birth control/protection: None       Review of Systems   Constitutional: Negative for appetite change, chills, fatigue, fever, unexpected weight gain and unexpected weight loss.   HENT: Negative for ear pain, sinus pressure, sore throat and trouble swallowing.    Eyes: Negative for blurred vision.   Respiratory: Negative for cough, chest tightness and shortness of breath.    Cardiovascular: Negative for chest pain and palpitations.   Gastrointestinal: Negative for abdominal pain, blood in stool, constipation,  "diarrhea, GERD and indigestion.   Endocrine: Negative for polydipsia.   Genitourinary: Negative for decreased urine volume, difficulty urinating and hematuria.   Musculoskeletal: Positive for back pain. Arthralgias: see HPI.   Skin: Negative for rash.   Neurological: Negative for numbness. Light-headedness: see HPI.   Hematological: Does not bruise/bleed easily.   Psychiatric/Behavioral: Negative for suicidal ideas.       Objective   Vitals:    07/25/22 0956   BP: 120/70   BP Location: Left arm   Patient Position: Sitting   Cuff Size: Adult   Pulse: 82   Temp: 97.7 °F (36.5 °C)   SpO2: 100%   Weight: 63.2 kg (139 lb 6.4 oz)   Height: 175.3 cm (69\")     Body mass index is 20.59 kg/m².    Physical Exam  Vitals and nursing note reviewed.   Constitutional:       General: She is not in acute distress.     Appearance: She is well-developed and well-groomed. She is not diaphoretic.   HENT:      Head: Normocephalic.      Right Ear: External ear normal.      Left Ear: External ear normal.   Eyes:      Conjunctiva/sclera: Conjunctivae normal.   Neck:      Vascular: No carotid bruit.   Cardiovascular:      Rate and Rhythm: Normal rate and regular rhythm.      Pulses: Normal pulses.      Heart sounds: Normal heart sounds. No murmur heard.  Pulmonary:      Effort: Pulmonary effort is normal. No respiratory distress.      Breath sounds: No rales. Decreased breath sounds: slight RLL.   Abdominal:      General: Bowel sounds are normal.      Palpations: Abdomen is soft. There is no hepatomegaly or splenomegaly.      Tenderness: There is abdominal tenderness (mild) in the suprapubic area. There is no guarding or rebound.   Musculoskeletal:      Cervical back: Normal range of motion and neck supple. No bony tenderness.      Thoracic back: No bony tenderness.      Lumbar back: No bony tenderness. Negative right straight leg raise test and negative left straight leg raise test.        Back:       Right lower leg: No edema.      Left " lower leg: No edema.   Skin:     General: Skin is warm and dry.      Findings: No rash.   Neurological:      Mental Status: She is alert and oriented to person, place, and time.      Motor: Motor function is intact. Weakness: hand grasp on right slightly weaker than left.      Gait: Gait abnormal (hemiparetic gait with mild high steps on right).      Deep Tendon Reflexes:      Reflex Scores:       Patellar reflexes are 2+ on the right side and 2+ on the left side.  Psychiatric:         Mood and Affect: Mood normal.         Behavior: Behavior normal.         Thought Content: Thought content normal.         Cognition and Memory: Cognition normal.         Judgment: Judgment normal.         Lab Results   Component Value Date    WBC 6.8 07/25/2022    RBC 4.30 07/25/2022    HGB 12.8 07/25/2022    HCT 40.0 07/25/2022    MCV 93 07/25/2022    MCH 29.8 07/25/2022    MCHC 32.0 07/25/2022    RDW 11.9 07/25/2022     07/25/2022    NEUTRORELPCT 71 07/25/2022    LYMPHORELPCT 22 07/25/2022    MONORELPCT 5 07/25/2022    EOSRELPCT 1 07/25/2022    BASORELPCT 1 07/25/2022    NEUTROABS 4.9 07/25/2022    LYMPHSABS 1.5 07/25/2022    MONOSABS 0.3 07/25/2022    EOSABS 0.1 07/25/2022    BASOSABS 0.1 07/25/2022    NRBC 0.0 12/11/2020     Lab Results   Component Value Date    GLUCOSE 70 07/25/2022    BUN 15 07/25/2022    CREATININE 0.81 07/25/2022    EGFRIFNONA 106 11/05/2021    EGFRIFAFRI 122 11/05/2021    BCR 19 07/25/2022    K 4.2 07/25/2022    CO2 25 07/25/2022    CALCIUM 9.4 07/25/2022    PROTENTOTREF 6.5 07/25/2022    ALBUMIN 4.4 07/25/2022    LABIL2 2.1 07/25/2022    AST 28 07/25/2022    ALT 30 07/25/2022      Lab Results   Component Value Date    CHLPL 168 07/25/2022    TRIG 194 (H) 07/25/2022    HDL 45 07/25/2022    VLDL 33 07/25/2022    LDL 90 07/25/2022     Lab Results   Component Value Date    TSH 2.370 09/07/2021     Lab Results   Component Value Date    COLORU Yellow 07/25/2022    CLARITYU Clear 07/25/2022    LEUKOCYTESUR  Negative 07/25/2022    BILIRUBINUR Negative 07/25/2022          Assessment    Problem List Items Addressed This Visit     Pain in right hand    Current Assessment & Plan     Continue Baclofen twice daily.  Continue Diclofenac twice daily.  Continue Gabapentin QID.  Follow up as scheduled with pain management.           Relevant Orders    Comprehensive Metabolic Panel (Completed)    Muscle hypertonicity    Current Assessment & Plan     Continue Baclofen twice daily.           History of repair of mitral valve    Overview     Description: [employer] annuloplasty ring, complex MV repair with significant intra-op and post-op complications           Current Assessment & Plan     Continue Metoprolol daily.  Follow up as scheduled with cardiology.           Dyslipidemia    Current Assessment & Plan     Continue Atorvastatin daily.  Continue to work on healthy diet and exercise.           Relevant Orders    Comprehensive Metabolic Panel (Completed)    Lipid Panel With LDL / HDL Ratio (Completed)    Unintentional weight loss    Current Assessment & Plan     Weight is improving since eating better.           Seizure (HCC)    Overview     First seizure 4/1/21 and second June 2021           Current Assessment & Plan     Stable on Keppra twice daily.           Anxiety    Current Assessment & Plan     Continue Duloxetine twice daily.  Follow up as scheduled with psychiatry.           Moderate episode of recurrent major depressive disorder (HCC)    Current Assessment & Plan     Patient's depression is recurrent and is moderate without psychosis. Their depression is currently active and the condition is improving with treatment. This will be reassessed at the next regular appointment. F/U as described:patient will continue current medication therapy.  Continue Duloxetine twice daily.  Follow up as scheduled with psychiatry.           Acute bilateral low back pain without sciatica - Primary    Current Assessment & Plan      Get x-ray at [facility].  Try ice/heat to lower back, whichever feels better.           Relevant Orders    Comprehensive Metabolic Panel (Completed)    CBC & Differential (Completed)    POC Urinalysis Dipstick, Automated (Completed)    XR Spine Lumbar 2 or 3 View          Return in about 4 months (around 11/25/2022) for Recheck.or sooner if symptoms persist or worsen.  Will consider referral to physical therapy pending x-ray results.     COVID-19 Precautions - Patient was compliant in wearing a mask. When I saw the patient, I used appropriate personal protective equipment (PPE) including mask, gloves, and eye shield (standard procedure).  Hand hygiene was completed before and after seeing the patient.

## 2022-07-25 NOTE — PATIENT INSTRUCTIONS
Get x-ray at [facility].  Try ice/heat to lower back, whichever feels better.  Do not take Ibuprofen and Diclofenac.  Continue to work on healthy diet and exercise as tolerated.  Follow up pending lab/x-ray results.  Follow up in 4 months, or sooner if symptoms persist or worsen.

## 2022-07-26 PROBLEM — M54.50 ACUTE BILATERAL LOW BACK PAIN WITHOUT SCIATICA: Status: ACTIVE | Noted: 2022-07-26

## 2022-07-26 LAB
ALBUMIN SERPL-MCNC: 4.4 G/DL (ref 3.8–4.8)
ALBUMIN/GLOB SERPL: 2.1 {RATIO} (ref 1.2–2.2)
ALP SERPL-CCNC: 88 IU/L (ref 44–121)
ALT SERPL-CCNC: 30 IU/L (ref 0–32)
AST SERPL-CCNC: 28 IU/L (ref 0–40)
BASOPHILS # BLD AUTO: 0.1 X10E3/UL (ref 0–0.2)
BASOPHILS NFR BLD AUTO: 1 %
BILIRUB SERPL-MCNC: 0.3 MG/DL (ref 0–1.2)
BUN SERPL-MCNC: 15 MG/DL (ref 6–20)
BUN/CREAT SERPL: 19 (ref 9–23)
CALCIUM SERPL-MCNC: 9.4 MG/DL (ref 8.7–10.2)
CHLORIDE SERPL-SCNC: 104 MMOL/L (ref 96–106)
CHOLEST SERPL-MCNC: 168 MG/DL (ref 100–199)
CO2 SERPL-SCNC: 25 MMOL/L (ref 20–29)
CREAT SERPL-MCNC: 0.81 MG/DL (ref 0.57–1)
EGFRCR SERPLBLD CKD-EPI 2021: 95 ML/MIN/1.73
EOSINOPHIL # BLD AUTO: 0.1 X10E3/UL (ref 0–0.4)
EOSINOPHIL NFR BLD AUTO: 1 %
ERYTHROCYTE [DISTWIDTH] IN BLOOD BY AUTOMATED COUNT: 11.9 % (ref 11.7–15.4)
GLOBULIN SER CALC-MCNC: 2.1 G/DL (ref 1.5–4.5)
GLUCOSE SERPL-MCNC: 70 MG/DL (ref 65–99)
HCT VFR BLD AUTO: 40 % (ref 34–46.6)
HDLC SERPL-MCNC: 45 MG/DL
HGB BLD-MCNC: 12.8 G/DL (ref 11.1–15.9)
IMM GRANULOCYTES # BLD AUTO: 0 X10E3/UL (ref 0–0.1)
IMM GRANULOCYTES NFR BLD AUTO: 0 %
LDLC SERPL CALC-MCNC: 90 MG/DL (ref 0–99)
LDLC/HDLC SERPL: 2 RATIO (ref 0–3.2)
LYMPHOCYTES # BLD AUTO: 1.5 X10E3/UL (ref 0.7–3.1)
LYMPHOCYTES NFR BLD AUTO: 22 %
MCH RBC QN AUTO: 29.8 PG (ref 26.6–33)
MCHC RBC AUTO-ENTMCNC: 32 G/DL (ref 31.5–35.7)
MCV RBC AUTO: 93 FL (ref 79–97)
MONOCYTES # BLD AUTO: 0.3 X10E3/UL (ref 0.1–0.9)
MONOCYTES NFR BLD AUTO: 5 %
NEUTROPHILS # BLD AUTO: 4.9 X10E3/UL (ref 1.4–7)
NEUTROPHILS NFR BLD AUTO: 71 %
PLATELET # BLD AUTO: 221 X10E3/UL (ref 150–450)
POTASSIUM SERPL-SCNC: 4.2 MMOL/L (ref 3.5–5.2)
PROT SERPL-MCNC: 6.5 G/DL (ref 6–8.5)
RBC # BLD AUTO: 4.3 X10E6/UL (ref 3.77–5.28)
SODIUM SERPL-SCNC: 141 MMOL/L (ref 134–144)
TRIGL SERPL-MCNC: 194 MG/DL (ref 0–149)
VLDLC SERPL CALC-MCNC: 33 MG/DL (ref 5–40)
WBC # BLD AUTO: 6.8 X10E3/UL (ref 3.4–10.8)

## 2022-07-27 NOTE — ASSESSMENT & PLAN NOTE
Get x-ray at [facility].  Try ice/heat to lower back, whichever feels better.

## 2022-07-27 NOTE — ASSESSMENT & PLAN NOTE
Continue Baclofen twice daily.  Continue Diclofenac twice daily.  Continue Gabapentin QID.  Follow up as scheduled with pain management.

## 2022-07-27 NOTE — ASSESSMENT & PLAN NOTE
Patient's depression is recurrent and is moderate without psychosis. Their depression is currently active and the condition is improving with treatment. This will be reassessed at the next regular appointment. F/U as described:patient will continue current medication therapy.  Continue Duloxetine twice daily.  Follow up as scheduled with psychiatry.

## 2022-07-28 ENCOUNTER — HOSPITAL ENCOUNTER (OUTPATIENT)
Dept: GENERAL RADIOLOGY | Facility: HOSPITAL | Age: 42
Discharge: HOME | End: 2022-07-28
Admitting: NURSE PRACTITIONER

## 2022-07-28 DIAGNOSIS — M54.50 ACUTE BILATERAL LOW BACK PAIN WITHOUT SCIATICA: ICD-10-CM

## 2022-07-28 DIAGNOSIS — M54.50 ACUTE BILATERAL LOW BACK PAIN WITHOUT SCIATICA: Primary | ICD-10-CM

## 2022-07-28 PROCEDURE — 72100 X-RAY EXAM L-S SPINE 2/3 VWS: CPT

## 2022-09-02 ENCOUNTER — TELEMEDICINE (OUTPATIENT)
Dept: BEHAVIORAL HEALTH | Facility: CLINIC | Age: 42
End: 2022-09-02

## 2022-09-02 DIAGNOSIS — F43.23 ADJUSTMENT DISORDER WITH MIXED ANXIETY AND DEPRESSED MOOD: Primary | ICD-10-CM

## 2022-09-02 RX ORDER — LEVETIRACETAM 500 MG/1
500 TABLET ORAL 2 TIMES DAILY
COMMUNITY
Start: 2022-08-08 | End: 2023-08-08

## 2022-09-02 NOTE — PROGRESS NOTES
"Subjective   Ania Reyes is a 42 y.o. female who presents today for follow up    This provider is located at [address] [place] [address], [address], [place] [id number]. The Patient is seen remotely using [employer] Patient is being seen via telehealth and confirm that they are in a secure environment for this session. The patient's condition being diagnosed/treated is appropriate for telemedicine. The provider identified himself/herself: herself as well as her credentials.   The patient gave consent to be seen remotely, and when consent is given they understand that the consent allows for patient identifiable information to be sent to a third party as needed.   They may refuse to be seen remotely at any time. The electronic data is encrypted and password protected, and the patient has been advised of the potential risks to privacy not withstanding such measures.    You have chosen to receive care through a telehealth visit.  Do you consent to use a video/audio connection for your medical care today? Yes    Referring Provider:  No referring provider defined for this encounter.    Chief Complaint:  Depression and anxiety, medication check     History of Present Illness:    9/2/22:  Patient presents today via [employer] Video visit from outside of [facility] in [place] with sister Bianca. Patient reports move to [place] went well as patient is now living with sister and sister's son.      Patient reports current medication, Cymbalta has been effective in management of both depression and anxiety.  Denies sleep problems, nor side effects.   Rates self a 4 out 10 for depression, and 4 out of 10 for anxiety.    Denies seizure activity.   Per sister patient is \"upbeat about things, I have to stay on her about eating\". Weight gain of 5 lb since last visit.        6/2/22: Patient presents today in office with sister, Nicolette.   Patient has not eaten for 7 days, however, sister was able to get patient to eat last night " "and today.  Patient was out of Gabapentin which caused nausea, was able to  medication yesterday.  Apparently there was a discrepancy with the date and pharmacy. Patient will be moving back to [place] on Monday with sister, Bianca.  Patient has noticed clothes are loose, weight trend reviewed.  Due to heart condition patient is not to weigh more than 140 lbs.     Since patient began splitting dose of Cymbalta to 60 mg every morning and 30 mg every night patient no longer sleeping all day,  Patient feels overall mood has improved.  Sister has noticed positive changes, as patient interacts more, no longer just nodding head, now engaging more with others, and laughing.     Patient will be living off [facility] And wishes to do video visits due to length of drive.      5/5/22: Patient presents today in office with mother, Haydee.  Patient reports since increase of Cymbalta had 2 weeks of being awake all day then had 2 weeks of sleeping all day.  \"she seems to want to sleep all the time.\"  \"today I am tired.\" Mother reports this past Monday patient slept all day, and woke up an hour before bedtime, and went back to sleep.     Patient has moved in with mother completely, sister is trying to find a house and patient plans to move in with sister.  Patient likes living with mother though does not like the country.  Patient plans to move in with other sister, Bianca, which will be in [place].   Patient plans to move on 5/31/22.     Patient declines therapy due to aphasia.  Patient feels mood has improved though difficult to determine due to increased sleep pattern.      3/31/22: INITIAL EVAL  Patient presents today in office with sister, Nicolette Johnson, with a history of depression and anxiety.  Patient suffered a stroke 2015, first seizure occured 6 yrs after stroke in July or August of 2021, and 4 weeks later had another seizure.  Started on Keppra 9/2021. Currently is followed at Epilepsy clinic and by " neurologist.  Patient with right hemiplegia, rle weakness, stability problems occasionally,  aphasia, and limited fine motor to right hand.     Patient dog, Rama, passed away 1 month ago, 2/27/22, from cancer at 5 yrs old.  Patient has been having difficulty coping, as patient did not eat for the first 4 days after, and this past Monday and Tues patient went without eating. Minimal oral intake, drinking big red more than water.  Patient admits to clothes not fitting as well since food deprivation.   Patient admits to anhedonia, crying often, having difficulty getting out of bed, increased desire to sleep, and lacking motivation.     Stressors: [place], spouse who passed away 2 yrs ago, dog passing away, and losing home. Patient sister reports patient was a victim of cat fishing and now house has to be sold as patient sent out [phone] to a boyfriend online.  Patient reports being with boyfriend for 1 yr and 6 months, he was in the , all communication was through online social media, no telephone or video communication.     Patient appeared to do well with loss of , cat fishing episode, but when Rama was put down patient shut down.      Patient will be moving to [place] with mother in approximately 1 month, house has been sold, and all belongings must be out by April 27.  Sister lives in [place], [place]. And assists with medical care and communication.      Before stroke patient only took vitamin d, has been on Cymbalta since after stroke for pain with nerves to right hand/arm prescribed by neurologist takes in the morning currently. Uncertain of start date, though, sister confirms for several years.           PHQ-9 Depression Screening  PHQ-9 Total Score: 9 9/2/2022 9 , reassess 12/2022    Little interest or pleasure in doing things? 3-->nearly every day   Feeling down, depressed, or hopeless? 0-->not at all   Trouble falling or staying asleep, or sleeping too much? 3-->nearly every day   Feeling  tired or having little energy? 3-->nearly every day   Poor appetite or overeating? 0-->not at all   Feeling bad about yourself - or that you are a failure or have let yourself or your family down? 0-->not at all   Trouble concentrating on things, such as reading the newspaper or watching television? 0-->not at all   Moving or speaking so slowly that other people could have noticed? Or the opposite - being so fidgety or restless that you have been moving around a lot more than usual? 0-->not at all   Thoughts that you would be better off dead, or of hurting yourself in some way? 0-->not at all   PHQ-9 Total Score 9     CALIN-7  Feeling nervous, anxious or on edge: (P) Not at all  Not being able to stop or control worrying: (P) Nearly every day  Worrying too much about different things: (P) Not at all  Trouble Relaxing: (P) Not at all  Being so restless that it is hard to sit still: (P) Not at all  Feeling afraid as if something awful might happen: (P) Not at all  CALIN 7 Total Score: (P) 3  If you checked any problems, how difficult have these problems made it for you to do your work, take care of things at home, or get along with other people: (P) Not difficult at all  reassess  12/2022    Past Surgical History:  Past Surgical History:   Procedure Laterality Date   • CARDIAC SURGERY  2015   • CYSTOSCOPY URETEROSCOPY LASER LITHOTRIPSY     • KNEE SURGERY Left    • MITRAL VALVE REPAIR/REPLACEMENT  03/2015    annuloplasty ring, complex MV repair with significant intra-op and post-op complications   • OTHER SURGICAL HISTORY      Selective Arterial Catheterization    • PARATHYROID GLAND SURGERY  2014    Parathyroid resection       Problem List:  Patient Active Problem List   Diagnosis   • Spastic hemiplegia affecting right dominant side (HCC)   • Pain in right hand   • Muscle hypertonicity   • History of stroke   • History of repair of mitral valve   • High risk medication use   • Dyslipidemia   • Allergic rhinitis   • Right  hemiplegia (HCC)   • Osteopenia   • Vitamin D deficiency   • History of parathyroid surgery   • Abnormality of gait following cerebrovascular accident (CVA)   • Left flank pain   • Hydroureteronephrosis   • Unintentional weight loss   • Dizziness   • Heart failure ([facility])   • Hypercalcemia   • Methicillin resistant Staphylococcus aureus infection   • Seizure ([facility])   • Anxiety   • Moderate episode of recurrent major depressive disorder ([facility])   • Pain disorder associated with psychological factors   • Acute bilateral low back pain without sciatica       Allergy:   No Known Allergies     Discontinued Medications:  There are no discontinued medications.    Current Medications:   Current Outpatient Medications   Medication Sig Dispense Refill   • aspirin 81 MG tablet Take 81 mg by mouth Daily.     • atorvastatin (LIPITOR) 20 MG tablet Take 20 mg by mouth Daily.     • baclofen (LIORESAL) 10 MG tablet Take 1 tablet by mouth 2 (Two) Times a Day As Needed for Muscle Spasms. 180 tablet 1   • diazePAM (DIASTAT ACUDIAL) 20 MG rectal kit INSERT 12.5MG INTO THE RECTUM ONCE FOR 1 DOSE FOR GENERALIZED SEIZURE LASTING OVER 3 MINUTES.     • diclofenac (VOLTAREN) 50 MG EC tablet TAKE 1 TABLET BY MOUTH THREE TIMES DAILY AS NEEDED FOR PAIN 270 tablet 0   • DULoxetine (Cymbalta) 30 MG capsule Take 1 capsule by mouth Every Night for 90 days. Indications: Major Depressive Disorder, Disease of the Peripheral Nerves 30 capsule 2   • DULoxetine (CYMBALTA) 60 MG capsule Take 1 capsule by mouth Every Morning for 90 days. Indications: Major Depressive Disorder, Disease of the Peripheral Nerves 30 capsule 2   • famotidine (PEPCID) 20 MG tablet Take 20 mg by mouth Daily.     • gabapentin (NEURONTIN) 600 MG tablet Take 600 mg by mouth 4 (Four) Times a Day.  5   • levETIRAcetam (KEPPRA) 500 MG tablet Take 500 mg by mouth 2 (Two) Times a Day.     • levETIRAcetam (KEPPRA) 500 MG tablet Take 500 mg by mouth.     • metoprolol succinate XL (TOPROL-XL) 25  MG 24 hr tablet Take 25 mg by mouth Daily.  3   • Multiple Vitamins-Minerals (MULTIVITAMIN ADULTS PO) Take 1 tablet by mouth Daily.     • potassium chloride (K-DUR) 10 MEQ CR tablet Take 10 mEq by mouth Daily.     • VITAMIN D, CHOLECALCIFEROL, PO Take 1 tablet by mouth Daily.       No current facility-administered medications for this visit.       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 ([facility]) 5/11/2015    Description: 02- - (N) Coronaries - LVEF 55% - Severe prolapse of the anterior & posterior mitral leaflet with severe MR,   • Functional cardiac murmur 4/14/2014   • Gingival enlargement 11/17/2016   • Gum hypertrophy    • Heart failure ([facility]) 8/13/2021    NYHA class 3 NYHA class 3 NYHA class 3 NYHA class 3   • High risk medication use    • History of acute sinusitis    • History of dizziness    • History of echocardiogram    • History of hyperparathyroidism    • History of kidney stones     Bilateral   • History of low back pain    • History of mitral valve repair     [employer] annuloplasty ring, complex MV repair with significant intra-op and post op complications   • History of nephrolithiasis    • History of stroke     LMCA; right hemiplegia   • History of transesophageal echocardiography ([facility])    • Kidney stone     3mm    • Lactase deficiency    • Left nephrolithiasis 8/12/2019   • Methicillin resistant Staphylococcus aureus infection 3/13/2015    No A2K system hx - now +MRSA sputum on 3/13/2015 No A2K system hx - now +MRSA sputum on 3/13/2015 No A2K system hx - now +MRSA sputum on  3/13/2015 No A2K system hx - now +MRSA sputum on 3/13/2015   • Mitral regurgitation    • Mitral valve prolapse    • Muscle hypertonicity    • Need for SBE (subacute bacterial endocarditis) prophylaxis 2019   • Osteopenia    • Pain in right hand    • Pain, wrist joint    • Parathyroid adenoma    • Positive depression screening    • Primary hyperparathyroidism (HCC)    • Right arm pain    • Right hemiplegia (HCC) 2015   • Seizures (HCC)    • Sinus tachycardia    • Spastic hemiplegia affecting dominant side (HCC)    • Vitamin D deficiency         Past Psychiatric History:  Began Treatment:3/31/22  Diagnoses:Depression and Anxiety  Psychiatrist:Denies  Therapist:Denies  Admission History:Denies  Medication Trials:2015 after stroke for 6 months, unable to recal name  Self Harm: Denies  Suicide Attempts:Denies   Psychosis, Anxiety, Depression: n/a    Substance Abuse History:   Types:Denies all, including illicit  Withdrawal Symptoms:Denies  Longest Period Sober:Not Applicable   AA: Not applicable     Social History:  Martial Status:Single  for 2 yrs   Employed:No disabled  Kids:No  House:Lives in a house with sisterBianca and her son in [place], [place]   History: Denies  Access to Guns:  no    Social History     Socioeconomic History   • Marital status:    Tobacco Use   • Smoking status: Never Smoker   • Smokeless tobacco: Never Used   Vaping Use   • Vaping Use: Never used   Substance and Sexual Activity   • Alcohol use: Yes     Alcohol/week: 1.0 standard drink     Types: 1 Drinks containing 0.5 oz of alcohol per week     Comment: social drinker   • Drug use: Never   • Sexual activity: Yes     Partners: Male     Birth control/protection: None       Family History:   Suicide Attempts: Denies  Suicide Completions:Denies      Family History   Problem Relation Age of Onset   • Depression Mother    • Anxiety disorder Mother    • Coronary artery disease Mother    • Glaucoma Mother    •  Nephrolithiasis Mother    • No Known Problems Father    • Anxiety disorder Sister    • Breast cancer Maternal Aunt         diagnosed in 40s   • Ovarian cancer Maternal Aunt    • Osteoporosis Maternal Aunt    • Coronary artery disease Maternal Grandfather    • Heart attack Maternal Grandfather          at age 54 years   • Colon cancer Maternal Grandmother         diagnosed in her 60's   • Breast cancer Maternal Grandmother    • Ovarian cancer Maternal Grandmother    • Colon cancer Paternal Grandfather    • No Known Problems Other        Developmental History:   Born: [place]  Siblings:1 brother, 1 sister  Childhood: Denies Abuse  High School:Completed  College:almost completed nursing, due to stroke (1/2 yr left)    Mental Status Exam:   Hygiene:   good  Cooperation:  Cooperative  Eye Contact:  Good  Psychomotor Behavior:  Appropriate  Affect:  Appropriate  Mood: euthymic  Speech:  Aphasic  Thought Process:  Goal directed  Thought Content:  Mood congruent  Suicidal:  None  Homicidal:  None  Hallucinations:  None  Delusion:  None  Memory:  Intact  Orientation:  Person, Place, Time and Situation  Reliability:  fair  Insight:  Good  Judgement:  Good  Impulse Control:  Good  Physical/Medical Issues:  Yes Stroke with rt sided weakness, HLD, Sz 6 yrs post stroke, orthostatic hypotension, s/p MVR, spastic hemiplegia to rt dominant hand     Review of Systems:  Review of Systems   Constitutional: Positive for appetite change. Negative for chills, diaphoresis, fatigue and fever.   HENT: Negative for drooling, ear pain, postnasal drip, rhinorrhea and sore throat.    Eyes: Negative for visual disturbance.   Respiratory: Negative for cough, shortness of breath and wheezing.    Cardiovascular: Negative for chest pain, palpitations and leg swelling.   Gastrointestinal: Negative for nausea and vomiting.   Endocrine: Negative for cold intolerance and heat intolerance.   Genitourinary: Negative for difficulty urinating.    Musculoskeletal: Negative for joint swelling and myalgias.   Skin: Negative for rash.   Allergic/Immunologic: Negative for immunocompromised state.   Neurological: Positive for dizziness and speech difficulty. Negative for seizures, numbness and headaches.   Psychiatric/Behavioral: Negative for decreased concentration, hallucinations, self-injury, sleep disturbance and suicidal ideas. The patient is not nervous/anxious.          Physical Exam:  Physical Exam  Psychiatric:         Attention and Perception: Attention and perception normal.         Mood and Affect: Mood and affect normal.         Speech: Speech is delayed.         Behavior: Behavior normal. Behavior is cooperative.         Thought Content: Thought content normal. Thought content does not include suicidal ideation. Thought content does not include suicidal plan.         Cognition and Memory: Cognition and memory normal.         Judgment: Judgment normal.      Comments: Aphasic secondary to stroke         Vital Signs:   There were no vitals taken for this visit.     Lab Results:   Office Visit on 07/25/2022   Component Date Value Ref Range Status   • Glucose 07/25/2022 70  65 - 99 mg/dL Final   • BUN 07/25/2022 15  6 - 20 mg/dL Final   • Creatinine 07/25/2022 0.81  0.57 - 1.00 mg/dL Final   • EGFR Result 07/25/2022 95  >59 mL/min/1.73 Final   • BUN/Creatinine Ratio 07/25/2022 19  9 - 23 Final   • Sodium 07/25/2022 141  134 - 144 mmol/L Final   • Potassium 07/25/2022 4.2  3.5 - 5.2 mmol/L Final   • Chloride 07/25/2022 104  96 - 106 mmol/L Final   • Total CO2 07/25/2022 25  20 - 29 mmol/L Final   • Calcium 07/25/2022 9.4  8.7 - 10.2 mg/dL Final   • Total Protein 07/25/2022 6.5  6.0 - 8.5 g/dL Final   • Albumin 07/25/2022 4.4  3.8 - 4.8 g/dL Final   • Globulin 07/25/2022 2.1  1.5 - 4.5 g/dL Final   • A/G Ratio 07/25/2022 2.1  1.2 - 2.2 Final   • Total Bilirubin 07/25/2022 0.3  0.0 - 1.2 mg/dL Final   • Alkaline Phosphatase 07/25/2022 88  44 - 121 IU/L  Final   • AST (SGOT) 07/25/2022 28  0 - 40 IU/L Final   • ALT (SGPT) 07/25/2022 30  0 - 32 IU/L Final   • WBC 07/25/2022 6.8  3.4 - 10.8 x10E3/uL Final   • RBC 07/25/2022 4.30  3.77 - 5.28 x10E6/uL Final   • Hemoglobin 07/25/2022 12.8  11.1 - 15.9 g/dL Final   • Hematocrit 07/25/2022 40.0  34.0 - 46.6 % Final   • MCV 07/25/2022 93  79 - 97 fL Final   • MCH 07/25/2022 29.8  26.6 - 33.0 pg Final   • MCHC 07/25/2022 32.0  31.5 - 35.7 g/dL Final   • RDW 07/25/2022 11.9  11.7 - 15.4 % Final   • Platelets 07/25/2022 221  150 - 450 x10E3/uL Final   • Neutrophil Rel % 07/25/2022 71  Not Estab. % Final   • Lymphocyte Rel % 07/25/2022 22  Not Estab. % Final   • Monocyte Rel % 07/25/2022 5  Not Estab. % Final   • Eosinophil Rel % 07/25/2022 1  Not Estab. % Final   • Basophil Rel % 07/25/2022 1  Not Estab. % Final   • Neutrophils Absolute 07/25/2022 4.9  1.4 - 7.0 x10E3/uL Final   • Lymphocytes Absolute 07/25/2022 1.5  0.7 - 3.1 x10E3/uL Final   • Monocytes Absolute 07/25/2022 0.3  0.1 - 0.9 x10E3/uL Final   • Eosinophils Absolute 07/25/2022 0.1  0.0 - 0.4 x10E3/uL Final   • Basophils Absolute 07/25/2022 0.1  0.0 - 0.2 x10E3/uL Final   • Immature Granulocyte Rel % 07/25/2022 0  Not Estab. % Final   • Immature Grans Absolute 07/25/2022 0.0  0.0 - 0.1 x10E3/uL Final   • Total Cholesterol 07/25/2022 168  100 - 199 mg/dL Final   • Triglycerides 07/25/2022 194 (A) 0 - 149 mg/dL Final   • HDL Cholesterol 07/25/2022 45  >39 mg/dL Final   • VLDL Cholesterol Miles 07/25/2022 33  5 - 40 mg/dL Final   • LDL Chol Calc ([facility]) 07/25/2022 90  0 - 99 mg/dL Final   • LDL/HDL RATIO 07/25/2022 2.0  0.0 - 3.2 ratio Final    Comment:                                     LDL/HDL Ratio                                              Men  Women                                1/2 Avg.Risk  1.0    1.5                                    Avg.Risk  3.6    3.2                                 2X Avg.Risk  6.2    5.0                                 3X Avg.Risk  8.0     6.1     • Color 07/25/2022 Yellow  Yellow, Straw, Dark Yellow, Ania Final   • Clarity, UA 07/25/2022 Clear  Clear Final   • Specific Gravity  07/25/2022 1.020  1.005 - 1.030 Final   • pH, Urine 07/25/2022 6.0  5.0 - 8.0 Final   • Leukocytes 07/25/2022 Negative  Negative Final   • Nitrite, UA 07/25/2022 Negative  Negative Final   • Protein, POC 07/25/2022 Negative  Negative mg/dL Final   • Glucose, UA 07/25/2022 Negative  Negative mg/dL Final   • Ketones, UA 07/25/2022 Negative  Negative Final   • Urobilinogen, UA 07/25/2022 Normal  Normal Final   • Bilirubin 07/25/2022 Negative  Negative Final   • Blood, UA 07/25/2022 Negative  Negative Final   • Lot Number 07/25/2022 [id number]   Final   • Expiration Date 07/25/2022 1/8/23   Final   Office Visit on 03/08/2022   Component Date Value Ref Range Status   • Glucose 03/08/2022 71  65 - 99 mg/dL Final   • BUN 03/08/2022 17  6 - 20 mg/dL Final   • Creatinine 03/08/2022 0.78  0.57 - 1.00 mg/dL Final   • EGFR Result 03/08/2022 99  >59 mL/min/1.73 Final   • BUN/Creatinine Ratio 03/08/2022 22  9 - 23 Final   • Sodium 03/08/2022 142  134 - 144 mmol/L Final   • Potassium 03/08/2022 4.1  3.5 - 5.2 mmol/L Final   • Chloride 03/08/2022 103  96 - 106 mmol/L Final   • Total CO2 03/08/2022 22  20 - 29 mmol/L Final   • Calcium 03/08/2022 9.5  8.7 - 10.2 mg/dL Final   • Total Protein 03/08/2022 6.4  6.0 - 8.5 g/dL Final   • Albumin 03/08/2022 4.4  3.8 - 4.8 g/dL Final   • Globulin 03/08/2022 2.0  1.5 - 4.5 g/dL Final   • A/G Ratio 03/08/2022 2.2  1.2 - 2.2 Final   • Total Bilirubin 03/08/2022 0.4  0.0 - 1.2 mg/dL Final   • Alkaline Phosphatase 03/08/2022 103  44 - 121 IU/L Final   • AST (SGOT) 03/08/2022 16  0 - 40 IU/L Final   • ALT (SGPT) 03/08/2022 21  0 - 32 IU/L Final   • WBC 03/08/2022 5.6  3.4 - 10.8 x10E3/uL Final   • RBC 03/08/2022 4.42  3.77 - 5.28 x10E6/uL Final   • Hemoglobin 03/08/2022 13.0  11.1 - 15.9 g/dL Final   • Hematocrit 03/08/2022 40.5  34.0 - 46.6 % Final    • MCV 03/08/2022 92  79 - 97 fL Final   • MCH 03/08/2022 29.4  26.6 - 33.0 pg Final   • MCHC 03/08/2022 32.1  31.5 - 35.7 g/dL Final   • RDW 03/08/2022 11.6 (A) 11.7 - 15.4 % Final   • Platelets 03/08/2022 248  150 - 450 x10E3/uL Final   • Neutrophil Rel % 03/08/2022 68  Not Estab. % Final   • Lymphocyte Rel % 03/08/2022 24  Not Estab. % Final   • Monocyte Rel % 03/08/2022 6  Not Estab. % Final   • Eosinophil Rel % 03/08/2022 1  Not Estab. % Final   • Basophil Rel % 03/08/2022 1  Not Estab. % Final   • Neutrophils Absolute 03/08/2022 3.8  1.4 - 7.0 x10E3/uL Final   • Lymphocytes Absolute 03/08/2022 1.3  0.7 - 3.1 x10E3/uL Final   • Monocytes Absolute 03/08/2022 0.3  0.1 - 0.9 x10E3/uL Final   • Eosinophils Absolute 03/08/2022 0.1  0.0 - 0.4 x10E3/uL Final   • Basophils Absolute 03/08/2022 0.0  0.0 - 0.2 x10E3/uL Final   • Immature Granulocyte Rel % 03/08/2022 0  Not Estab. % Final   • Immature Grans Absolute 03/08/2022 0.0  0.0 - 0.1 x10E3/uL Final   • Total Cholesterol 03/08/2022 158  100 - 199 mg/dL Final   • Triglycerides 03/08/2022 147  0 - 149 mg/dL Final   • HDL Cholesterol 03/08/2022 43  >39 mg/dL Final   • VLDL Cholesterol Miles 03/08/2022 26  5 - 40 mg/dL Final   • LDL Chol Calc ([facility]) 03/08/2022 89  0 - 99 mg/dL Final   • LDL/HDL RATIO 03/08/2022 2.1  0.0 - 3.2 ratio Final    Comment:                                     LDL/HDL Ratio                                              Men  Women                                1/2 Avg.Risk  1.0    1.5                                    Avg.Risk  3.6    3.2                                 2X Avg.Risk  6.2    5.0                                 3X Avg.Risk  8.0    6.1         EKG Results:  No orders to display       Imaging Results:  XR Chest PA & Lateral    Result Date: 3/13/2022  No focal pulmonary consolidation. Follow-up as clinical indications persist.  This report was finalized on 3/13/2022 10:34 AM by Dr. Meliton Simental M.D.          Assessment & Plan    Diagnoses and all orders for this visit:    1. Adjustment disorder with mixed anxiety and depressed mood (Primary)        Visit Diagnoses:    ICD-10-CM ICD-9-CM   1. Adjustment disorder with mixed anxiety and depressed mood  F43.23 309.28       PLAN:  1. Safety: No acute safety concerns  2. Therapy: Declines   3. Risk Assessment: Risk of self-harm acutely is low.  Risk factors include anxiety disorder, mood disorder, and recent psychosocial stressors (pandemic). Protective factors include no family history, denies access to guns/weapons, no present SI, no history of suicide attempts or self-harm in the past, minimal AODA, healthcare seeking, future orientation, willingness to engage in care.  Risk of self-harm chronically is also low, but could be further elevated in the event of treatment noncompliance and/or AODA.  4. Meds: Continue Cymbalta 60 mg by mouth daily every morning and 30 mg by mouth nightly  to target chronic pain as previously ordered per neurologist and depression with anxiety.  Patient has adequate refills until early Oct. For which patient has been instructed to contact pharmacy for refill.  Updated patient pharmacies   5. Labs: n/a    Patient doing well, mood improved, tolerating Cymbalta without side effects, continues to struggle with appetite, though noted 5 lb weight gain since last visit per EHR which noted weight of 139 7/25/22.  Will see patient back in 3 months or sooner if symptoms worsen or fail to improve.     6/2/22:   -Continue Cymbalta 60 mg by mouth daily every morning and 30 mg by mouth nightly  to target chronic pain as previously ordered per neurologist and depression with anxiety.     Patient doing well with current medication regimen, will be moving to [place] with sister soon.  Discussed option for telehealth video visits and patient agrees with plan, informed patient and sister of need to have an in person visit every 12 months per medicare guidelines.     5/5/22:    Change Cymbalta from 90 mg by mouth daily to 60 mg by mouth every morning and 30 mg by mouth nightly  to target chronic pain as previously ordered per neurologist and depression with anxiety.  Patient experiencing increased sedation with one total dose of 90 mg, will split doses. Overall mood improved with increase thus far.  Therapy-offered and encouraged today; patient to contact provider if later decides; would need one in [place] as patient will be relocating at the end of the month    3/31/22:   Increase Cymbalta from 60 mg to 90 mg by mouth daily in the morning to target chronic pain as previously ordered per neurologist and depression with anxiety. Discussed all risks, benefits, alternatives, and side effects of Duloxetine including but not limited to GI upset, sexual dysfunction, bleeding risk, seizure risk, weight loss, insomnia, diaphoresis, drowsiness, headache, dizziness, fatigue, activation of radha or hypomania, increased fragility fracture risk, hyponatremia, increased BP, hepatotoxicity, ocular effects, withdrawal syndrome following abrupt discontinuation, serotonin syndrome, and activation of suicidal ideation and behavior. Pt educated on the need to practice safe sex while taking this med. Discussed the need for pt to immediately call the office for any new or worsening symptoms, such as worsening depression; feeling nervous or restless; suicidal thoughts or actions; or other changes changes in mood or behavior, and all other concerns. Pt educated on med compliance and the risks of suddenly stopping this medication or missing doses. Pt verbalized understanding and is agreeable to taking Duloxetine. Addressed all questions and concerns.  Will see patient back in 5 weeks as patient will be moving out of home into mother's home and must have all belongings out of home by 4/27/22.        Patient screened positive for depression based on a PHQ-9 score of 9 on 9/2/2022. Follow-up recommendations  include: Prescribed antidepressant medication treatment and Suicide Risk Assessment performed.           TREATMENT PLAN/GOALS: Continue supportive psychotherapy efforts and medications as indicated. Treatment and medication options discussed during today's visit. Patient acknowledged and verbally consented to continue with current treatment plan and was educated on the importance of compliance with treatment and follow-up appointments.    MEDICATION ISSUES:  LAMONTE reviewed as expected.  Discussed medication options and treatment plan of prescribed medication as well as the risks, benefits, and side effects including potential falls, possible impaired driving and metabolic adversities among others. Patient is agreeable to call the office with any worsening of symptoms or onset of side effects. Patient is agreeable to call 911 or go to the nearest ER should he/she begin having SI/HI. No medication side effects or related complaints today.     MEDS ORDERED DURING VISIT:  No orders of the defined types were placed in this encounter.      Return in about 3 months (around 12/2/2022) for Video visit.         I spent 26 minutes caring for Ania on this date of service. This time includes time spent by me in the following activities: preparing for the visit, obtaining and/or reviewing a separately obtained history, performing a medically appropriate examination and/or evaluation, counseling and educating the patient/family/caregiver, referring and communicating with other health care professionals and documenting information in the medical record.      This document has been electronically signed by YONG Aranda  September 2, 2022 14:27 EDT      Part of this note may be an electronic transcription/translation of spoken language to printed text using the Dragon Dictation System.

## 2022-09-02 NOTE — PATIENT INSTRUCTIONS
1.  Please return to clinic at your next scheduled visit.  Contact the clinic ([phone]) or Latonya Medical Assistant at [facility] directly at [phone] at least 24 hours prior in the event you need to cancel.    2.  Do no harm to yourself or others.    3.  Avoid alcohol and drugs.    4.  Take all medications as prescribed.  Please contact the clinic with any concerns. If you are in need of medication refills, please call the clinic at [phone].    5. Should you want to get in touch with your provider, YONG Aranda, please utilize [employer] message or contact the office ([phone]), and staff will be able to page the provider on call directly.   6.  In the event you have personal crisis, contact the following crisis numbers: Suicide Prevention Hotline 1-155.449.6204; [facility] Helpline 8-322-970-IARR; [facility] Emergency Room [phone]; text HELLO to 020061; or 081.    7.  Please feel free to contact my Medical Assistant, Latonya, directly at [phone], please leave a voice mail if you do not get an answer, and she will return your call within 24 hrs.  Recommend saving Latonya's direct number in phone as this is the PREFERRED & EASIEST way to get in contact with your provider.     8.  You will NOT be able to contact provider on [facility], as Behavioral Health Providers are restricted. YOU MUST CALL      SPECIFIC RECOMMENDATIONS:     1.      Medications discussed at this encounter:                   - Continue current medications      2.      Psychotherapy recommendations: Declined     3.     Return to clinic: 3 months     Please arrive at least 15 minutes before your scheduled appointment time to complete check in process.

## 2022-09-12 ENCOUNTER — HOSPITAL ENCOUNTER (OUTPATIENT)
Dept: PHYSICAL THERAPY | Facility: HOSPITAL | Age: 42
Setting detail: THERAPIES SERIES
Discharge: HOME | End: 2022-09-12

## 2022-09-12 DIAGNOSIS — M25.60 DECREASED RANGE OF MOTION: Primary | ICD-10-CM

## 2022-09-12 DIAGNOSIS — R29.898 WEAKNESS OF LOWER EXTREMITY, UNSPECIFIED LATERALITY: ICD-10-CM

## 2022-09-12 DIAGNOSIS — M54.50 ACUTE BILATERAL LOW BACK PAIN WITHOUT SCIATICA: ICD-10-CM

## 2022-09-12 PROCEDURE — 97161 PT EVAL LOW COMPLEX 20 MIN: CPT

## 2022-09-12 PROCEDURE — 97110 THERAPEUTIC EXERCISES: CPT

## 2022-09-13 NOTE — THERAPY EVALUATION
Outpatient Physical Therapy Ortho Initial Evaluation  [facility]     Patient Name: Ania Reyes  : 1980  MRN: [id number]  Today's Date: 2022      Visit Date: 2022    Patient Active Problem List   Diagnosis   • Spastic hemiplegia affecting right dominant side (HCC)   • Pain in right hand   • Muscle hypertonicity   • History of stroke   • History of repair of mitral valve   • High risk medication use   • Dyslipidemia   • Allergic rhinitis   • Right hemiplegia ([facility])   • Osteopenia   • Vitamin D deficiency   • History of parathyroid surgery   • Abnormality of gait following cerebrovascular accident (CVA)   • Left flank pain   • Hydroureteronephrosis   • Unintentional weight loss   • Dizziness   • Heart failure ([facility])   • Hypercalcemia   • Methicillin resistant Staphylococcus aureus infection   • Seizure ([facility])   • Anxiety   • Moderate episode of recurrent major depressive disorder ([facility])   • Pain disorder associated with psychological factors   • Acute bilateral low back pain without sciatica        Past Medical History:   Diagnosis Date   • Allergic rhinitis    • Aphasia    • BMI 22.0-22.9, adult    • Burning with urination 2020   • Cerebral infarction due to embolism of left middle cerebral artery ([facility])    • Depression with anxiety    • Dyslipidemia    • Embolism of left middle cerebral artery 2015   • Encounter for immunization    • Fatigue    • 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 ([facility]) 2015    Description: 2015 - (N) Coronaries - LVEF 55% - Severe prolapse of the anterior & posterior mitral leaflet with severe MR,   • Functional cardiac murmur 2014   • Gingival enlargement 2016   • Gum hypertrophy    • Heart failure ([facility]) 2021    NYHA class 3 NYHA class 3 NYHA class 3 NYHA class 3   • High risk medication use    • History of acute sinusitis    • History of dizziness    • History of echocardiogram    • History of  hyperparathyroidism    • History of kidney stones     Bilateral   • History of low back pain    • [facility] of mitral valve repair     [facility] complex MV repair with significant intra-op and post op complications   • History of nephrolithiasis    • History of stroke     LMCA; right hemiplegia   • History of transesophageal echocardiography ([facility])    • Kidney stone     3mm    • Lactase deficiency    • Left nephrolithiasis 8/12/2019   • Methicillin resistant Staphylococcus aureus infection 3/13/2015    No A2K system hx - now +MRSA sputum on 3/13/2015 No A2K system hx - now +MRSA sputum on 3/13/2015 No A2K system hx - now +MRSA sputum on 3/13/2015 No A2K system hx - now +MRSA sputum on 3/13/2015   • Mitral regurgitation    • Mitral valve prolapse    • Muscle hypertonicity    • Need for SBE (subacute bacterial endocarditis) prophylaxis 8/12/2019   • Osteopenia    • Pain in right hand    • Pain, wrist joint    • Parathyroid adenoma    • Positive depression screening    • Primary hyperparathyroidism (HCC)    • Right arm pain    • Right hemiplegia ([facility]) 5/28/2015   • Seizures ([facility])    • Sinus tachycardia    • Spastic hemiplegia affecting dominant side ([facility])    • Vitamin D deficiency         Past Surgical History:   Procedure Laterality Date   • CARDIAC SURGERY  2015   • CYSTOSCOPY URETEROSCOPY LASER LITHOTRIPSY     • KNEE SURGERY Left    • MITRAL VALVE REPAIR/REPLACEMENT  03/2015    annuloplasty ring, complex MV repair with significant intra-op and post-op complications   • OTHER SURGICAL HISTORY      Selective Arterial Catheterization    • PARATHYROID GLAND SURGERY  2014    Parathyroid resection       Visit Dx:     ICD-10-CM ICD-9-CM   1. Decreased range of motion  M25.60 719.50   2. Acute bilateral low back pain without sciatica  M54.50 724.2     338.19   3. Weakness of lower extremity, unspecified laterality  R29.898 729.89          Patient History     Row Name 09/12/22 7480              History    Chief Complaint Pain  -JA      Type of Pain Back pain  -JA      Brief Description of Current Complaint Here for LBP, began having LBP 3 months ago--no prior hx.  They think it is possibly scar tissue from 5 kidney stone surgeries. Pt sits and watches television a lot, moved a few months ago and now chooses to sit on bed to watch tv.  -JA              Pain     Pain Location Back  -JA      Pain at Present 5  -JA      Pain Frequency Constant/continuous  -JA            User Key  (r) = Recorded By, (t) = Taken By, (c) = Cosigned By    Initials Name Provider Type    Yolis Berkowitz, PT Physical Therapist                 PT Ortho     Row Name 09/12/22 1500       Posture/Observations    Alignment Options Forward head;Cervical lordosis;Thoracic kyphosis;Rounded shoulders;Scapular elevation;Scoliosis;Lumbar lordosis  -JA    Forward Head Increased  -JA    Cervical Lordosis Decreased  -JA    Thoracic Kyphosis Increased  -JA    Rounded Shoulders Increased  -JA    Scoliosis Mild  -JA    Lumbar lordosis Increased  -JA       Quarter Clearing    Quarter Clearing Lower Quarter Clearing  -JA       Myotomal Screen- Lower Quarter Clearing    Hip flexion (L2) Bilateral:;4 (Good);4+ (Good +)  -JA    Knee extension (L3) Bilateral:;4+ (Good +)  -JA    Ankle DF (L4) Right:;3+ (Fair +);Left:;4+ (Good +)  -JA    Ankle PF (S1) Right:;4- (Good -);Left:;4 (Good)  -JA    Knee flexion (S2) Left:;4- (Good -);Right:;4+ (Good +)  L is weaker than her CVA involved R  -JA       Lumbar ROM Screen- Lower Quarter Clearing    Lumbar Flexion Impaired  50%; pain with flexing but not returning upright  -JA    Lumbar Extension Impaired  30% it inc pain nadia  -JA    Lumbar Lateral Flexion Impaired  50%  -JA    Lumbar Rotation Impaired  L 50% inc R-side pain, R no inc  -JA       Special Tests/Palpation    Special Tests/Palpation Lumbar/SI  -JA       Lumbosacral Palpation    Lumbosacral Palpation? --  TTP nadia lumbar and flank region, also nadia  glutes  -ELVIA       MMT (Manual Muscle Testing)    General MMT Comments hip IR R 4/5, L 3+; ER R 4/5, L 3+; nadia prone hip ext 3+ R inc R LBP, L no inc pain  -ELVIA       Transfers    Comment, (Transfers) UE assist  -ELVIA       Gait/Stairs (Locomotion)    Comment, (Gait/Stairs) asymmetrical gait, history of CVA w/R-sided weakness  -ELVIA          User Key  (r) = Recorded By, (t) = Taken By, (c) = Cosigned By    Initials Name Provider Type    Yolis Berkowitz, PT Physical Therapist                            Therapy Education  Education Details: [id number] see OP ex for details  Given: HEP, Symptoms/condition management, Pain management, Posture/body mechanics  Program: New  How Provided: Verbal, Demonstration, Written  Provided to: Patient  Level of Understanding: Verbalized, Demonstrated      PT OP Goals     Row Name 09/12/22 1500          PT Short Term Goals    STG Date to Achieve 10/13/22  -ELVIA     STG 1 Patient will be independent and compliant with initial HEP. ne  -ELVIA     STG 1 Progress New  -ELVIA     STG 2 Patient will demonstrate correct posture in sitting and standing to decrease strain/pain on spine.  -ELVIA     STG 2 Progress New  -ELVIA     STG 3 Pt will demonstrate correct body mechanics with bending, reaching, and lifting ADL’s  -ELVIA     STG 3 Progress New  -ELVIA     STG 4 Pain will decrease from constant to intermittent.  -ELVAI     STG 4 Progress New  -ELVIA            Long Term Goals    LTG Date to Achieve 11/12/22  -ELVIA     LTG 1 Pt will be independent with advanced HEP for spinal stabilization and LE/core strengthening.  -ELVIA     LTG 1 Progress New  -ELVIA     LTG 2 Pt will be able to perform ADLs with no more than 2/10 pain.  -ELVIA     LTG 2 Progress New  -ELVIA     LTG 3 Pt will demonstrate 4/5 or better strength throughout nadia LEs.  -ELVIA     LTG 3 Progress New  -ELVIA     LTG 4 Pt will have decreased score by 10% on Oswestry Disability Index indicating decrease in perceived functional disability.  -ELVIA     LTG 4 Progress New  -ELVIA             Time Calculation    PT Goal Re-Cert Due Date 12/12/22  -ELVIA           User Key  (r) = Recorded By, (t) = Taken By, (c) = Cosigned By    Initials Name Provider Type    Yolis Berkowitz, PT Physical Therapist                 PT Assessment/Plan     Row Name 09/12/22 1500          PT Assessment    Functional Limitations Impaired gait;Limitation in home management;Limitations in community activities;Limitations in functional capacity and performance;Performance in leisure activities;Performance in self-care ADL  -ELVIA     Impairments Pain;Posture;Range of motion;Muscle strength;Poor body mechanics  -ELVIA     Assessment Comments Ania Reyes is a 42 y.o. female referred to outpatient physical therapy for evaluation and treatment of acute nadia LBP of insidious onset.  Patient presents with c/o pain and point tenderness in nadia lumbar and flank region as well as glutes.  She demonstrates decreased trunk ROM, weakness in nadia LEs, poor posture, decreased transitional movements and impaired functional mobility. Signs and symptoms are consistent with referring diagnosis.  Pertinent comorbidities and personal factors that may affect progress include, but are not limited to, spastic hemiplegia affecting dominant right side and gait abnormality since CVA, other co-morbidities.  This condition is stable. Recommend skilled PT to address functional deficits. Thank you for this referral.  -ELVIA     Please refer to paper survey for additional self-reported information Yes  -ELVIA     Rehab Potential Good  -ELVIA     Patient/caregiver participated in establishment of treatment plan and goals Yes  -     Patient would benefit from skilled therapy intervention Yes  -JA            PT Plan    PT Frequency 2x/week  -ELVIA     Predicted Duration of Therapy Intervention (PT) 8 visits  -ELVIA     Planned CPT's? PT EVAL LOW COMPLEXITY: 32612;PT RE-EVAL: 46011;PT THER PROC EA 15 MIN: 51388;PT THER ACT EA 15 MIN: 67142;PT MANUAL THERAPY EA 15 MIN:  16770;PT NEUROMUSC RE-EDUCATION EA 15 MIN: 38797;PT GAIT TRAINING EA 15 MIN: 24591;PT HOT OR COLD PACK TREAT MCARE;PT ELECTRICAL STIM UNATTEND:   -JA     PT Plan Comments review ther ex, warm-up Nustep, progress with piri stretch, gentle resisted hip abd and add, STS  -JA           User Key  (r) = Recorded By, (t) = Taken By, (c) = Cosigned By    Initials Name Provider Type    Yolis Berkowitz, PT Physical Therapist                   OP Exercises     Row Name 09/12/22 1500             Subjective Comments    Subjective Comments initial eval  -JA              Total Minutes    42767 - PT Therapeutic Exercise Minutes 23  -JA              Exercise 1    Exercise Name 1 Discussed POC, educated for posture and importance of sitting with good back support v. on side of bed, importance of activity in general , expectations of compliance with HEP.  -JA      Time 1 7min  -JA              Exercise 2    Exercise Name 2 Yoav pose with lateral arms to R (L inc pain)  -JA      Cueing 2 Verbal;Tactile;Demo  -JA      Reps 2 5  -JA      Time 2 5sec  -JA              Exercise 3    Exercise Name 3 Cat-Camel  -JA      Cueing 3 Verbal;Tactile;Demo  -JA      Reps 3 5  -JA      Time 3 5sec  -JA              Exercise 4    Exercise Name 4 LTR  -JA      Cueing 4 Verbal;Tactile  -JA      Reps 4 10  -JA      Time 4 5sec  -JA      Additional Comments stay in comfortable range  -JA              Exercise 5    Exercise Name 5 HL SKC  -JA      Cueing 5 Tactile;Demo;Verbal  -JA      Reps 5 3  -JA      Time 5 20sec  -JA              Exercise 6    Exercise Name 6 HL TA  -JA      Cueing 6 Verbal;Tactile;Demo  -JA      Sets 6 10  -JA      Reps 6 5sec  -JA      Additional Comments can do supine, sitting, Qped  -JA              Exercise 7    Exercise Name 7 HL PPT  -JA      Cueing 7 Verbal;Tactile;Demo  -JA      Reps 7 10  -JA            User Key  (r) = Recorded By, (t) = Taken By, (c) = Cosigned By    Initials Name Provider Type    ELVIA Marks  Yolis JACOBS, PT Physical Therapist                              Outcome Measure Options: Modified Oswestry, Lower Extremity Functional Scale (LEFS)  Lower Extremity Functional Index  Any of your usual work, housework or school activities: Quite a bit of difficulty  Getting into or out of the bath: No difficulty  Walking between rooms: A little bit of difficulty  Putting on your shoes or socks: A little bit of difficulty  Squatting: A little bit of difficulty  Lifting an object, like a bag of groceries from the floor: No difficulty  Performing light activities around your home: A little bit of difficulty  Performing heavy activities around your home: A little bit of difficulty  Getting into or out of a car: No difficulty  Walking 2 blocks: No difficulty  Walking a mile: Quite a bit of difficulty  Going up or down 10 stairs (about 1 flight of stairs): Quite a bit of difficulty  Standing for 1 hour: A little bit of difficulty  Sitting for 1 hour: A little bit of difficulty  Running on even ground: No difficulty  Running on uneven ground: A little bit of difficulty  Making sharp turns while running fast: A little bit of difficulty  Hopping: A little bit of difficulty  Rolling over in bed: A little bit of difficulty  Modified Oswestry  Modified Oswestry Score/Comments: front page score 6 out of 25; omitted back page - will have her finish next visit      Time Calculation:     Start Time: 1510  Stop Time: 1555  Time Calculation (min): 45 min  Timed Charges  32434 - PT Therapeutic Exercise Minutes: 23  Untimed Charges  PT Eval/Re-eval Minutes: 22  Total Minutes  Timed Charges Total Minutes: 23  Untimed Charges Total Minutes: 22   Total Minutes: 45     Therapy Charges for Today     Code Description Service Date Service Provider Modifiers Qty    38264446705 HC PT THER PROC EA 15 MIN 9/12/2022 Yolis Marks, PT GP 1    86620020962 HC PT EVAL LOW COMPLEXITY 2 9/12/2022 Yolis Marks, PT GP 1          PT  G-Codes  Outcome Measure Options: Modified Oswestry, Lower Extremity Functional Scale (LEFS)  Modified Oswestry Score/Comments: front page score 6 out of 25; omitted back page - will have her finish next visit         Yolis Marks, PT  9/12/2022

## 2022-09-26 DIAGNOSIS — G81.10 SPASTIC HEMIPLEGIA AFFECTING DOMINANT SIDE: ICD-10-CM

## 2022-09-26 DIAGNOSIS — M79.641 CHRONIC PAIN OF RIGHT HAND: ICD-10-CM

## 2022-09-26 DIAGNOSIS — F43.23 ADJUSTMENT DISORDER WITH MIXED ANXIETY AND DEPRESSED MOOD: ICD-10-CM

## 2022-09-26 DIAGNOSIS — G89.29 CHRONIC PAIN OF RIGHT HAND: ICD-10-CM

## 2022-09-26 DIAGNOSIS — M62.89 MUSCLE HYPERTONICITY: ICD-10-CM

## 2022-09-26 DIAGNOSIS — M79.641 PAIN IN RIGHT HAND: ICD-10-CM

## 2022-09-27 RX ORDER — DULOXETIN HYDROCHLORIDE 30 MG/1
CAPSULE, DELAYED RELEASE ORAL
Qty: 270 CAPSULE | Refills: 1 | Status: SHIPPED | OUTPATIENT
Start: 2022-09-27 | End: 2023-03-26

## 2022-09-27 NOTE — TELEPHONE ENCOUNTER
Rx Refill Note  Requested Prescriptions     Pending Prescriptions Disp Refills   • diclofenac (VOLTAREN) 50 MG EC tablet [Pharmacy Med Name: Diclofenac Sodium 50 MG Oral Tablet Delayed Release] 270 tablet 0     Sig: TAKE 1 TABLET BY MOUTH THREE TIMES DAILY AS NEEDED FOR PAIN      Last office visit with prescribing clinician: 7/25/2022      Next office visit with prescribing clinician: 12/6/2022

## 2022-09-27 NOTE — TELEPHONE ENCOUNTER
Reordered Cymbalta as 30 mg capsules only, with instructions to take 2 caps in the morning to equal 60 mg and 1 cap at night to equal 30 mg, which is current dose.  Discontinued Cymbalta 60 mg capsule.  Patient will only have one prescription of Cymbalta for both morning and nightly dose.

## 2022-09-27 NOTE — TELEPHONE ENCOUNTER
[facility] Protocol Passed 09/26/2022 05:28 PM   Protocol Details  No active pregnancy on record    No positive pregnancy test in past 12 months    Blood Pressure on record in past 12 months    PHQ-2 or PHQ-9 within last 12 Mo    Recent or Future Visit (12mo/30days)    No BP Higher than 180/110 in past 12 Mo     NEXT APPT WITH PROVIDER  Appointment with Kalyn Martin APRN (12/02/2022)    PROVIDER PLEASE ADVISE

## 2022-10-05 ENCOUNTER — APPOINTMENT (OUTPATIENT)
Dept: PHYSICAL THERAPY | Facility: HOSPITAL | Age: 42
End: 2022-10-05

## 2022-10-10 ENCOUNTER — HOSPITAL ENCOUNTER (OUTPATIENT)
Dept: PHYSICAL THERAPY | Facility: HOSPITAL | Age: 42
Setting detail: THERAPIES SERIES
Discharge: HOME | End: 2022-10-10

## 2022-10-10 DIAGNOSIS — R29.898 WEAKNESS OF LOWER EXTREMITY, UNSPECIFIED LATERALITY: ICD-10-CM

## 2022-10-10 DIAGNOSIS — M25.60 DECREASED RANGE OF MOTION: ICD-10-CM

## 2022-10-10 DIAGNOSIS — M54.50 ACUTE BILATERAL LOW BACK PAIN WITHOUT SCIATICA: Primary | ICD-10-CM

## 2022-10-10 PROCEDURE — 97110 THERAPEUTIC EXERCISES: CPT

## 2022-10-10 NOTE — THERAPY TREATMENT NOTE
Outpatient Physical Therapy Ortho Treatment Note  [facility]     Patient Name: Ania Reyes  : 1980  MRN: [id number]  Today's Date: 10/10/2022      Visit Date: 10/10/2022    Visit Dx:    ICD-10-CM ICD-9-CM   1. Acute bilateral low back pain without sciatica  M54.50 724.2     338.19   2. Decreased range of motion  M25.60 719.50   3. Weakness of lower extremity, unspecified laterality  R29.898 729.89       Patient Active Problem List   Diagnosis   • Spastic hemiplegia affecting right dominant side (HCC)   • Pain in right hand   • Muscle hypertonicity   • History of stroke   • History of repair of mitral valve   • High risk medication use   • Dyslipidemia   • Allergic rhinitis   • Right hemiplegia ([facility])   • Osteopenia   • Vitamin D deficiency   • History of parathyroid surgery   • Abnormality of gait following cerebrovascular accident (CVA)   • Left flank pain   • Hydroureteronephrosis   • Unintentional weight loss   • Dizziness   • Heart failure ([facility])   • Hypercalcemia   • Methicillin resistant Staphylococcus aureus infection   • Seizure ([facility])   • Anxiety   • Moderate episode of recurrent major depressive disorder ([facility])   • Pain disorder associated with psychological factors   • Acute bilateral low back pain without sciatica        Past Medical History:   Diagnosis Date   • Allergic rhinitis    • Aphasia    • BMI 22.0-22.9, adult    • Burning with urination 2020   • Cerebral infarction due to embolism of left middle cerebral artery ([facility])    • Depression with anxiety    • Dyslipidemia    • Embolism of left middle cerebral artery 2015   • Encounter for immunization    • Fatigue    • 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 ([facility]) 2015    Description: 2015 - (N) Coronaries - LVEF 55% - Severe prolapse of the anterior & posterior mitral leaflet with severe MR,   • Functional cardiac murmur 2014   • Gingival enlargement 2016   • Gum hypertrophy    •  Heart failure (HCC) 8/13/2021    NYHA class 3 NYHA class 3 NYHA class 3 NYHA class 3   • High risk medication use    • History of acute sinusitis    • History of dizziness    • History of echocardiogram    • History of hyperparathyroidism    • History of kidney stones     Bilateral   • History of low back pain    • [facility] complex MV repair with significant intra-op and post op complications   • History of nephrolithiasis    • History of stroke     LMCA; right hemiplegia   • History of transesophageal echocardiography ([facility])    • Kidney stone     3mm    • Lactase deficiency    • Left nephrolithiasis 8/12/2019   • Methicillin resistant Staphylococcus aureus infection 3/13/2015    No A2K system hx - now +MRSA sputum on 3/13/2015 No A2K system hx - now +MRSA sputum on 3/13/2015 No A2K system hx - now +MRSA sputum on 3/13/2015 No A2K system hx - now +MRSA sputum on 3/13/2015   • Mitral regurgitation    • Mitral valve prolapse    • Muscle hypertonicity    • Need for SBE (subacute bacterial endocarditis) prophylaxis 8/12/2019   • Osteopenia    • Pain in right hand    • Pain, wrist joint    • Parathyroid adenoma    • Positive depression screening    • Primary hyperparathyroidism (HCC)    • Right arm pain    • Right hemiplegia ([facility]) 5/28/2015   • Seizures ([facility])    • Sinus tachycardia    • Spastic hemiplegia affecting dominant side ([facility])    • Vitamin D deficiency         Past Surgical History:   Procedure Laterality Date   • CARDIAC SURGERY  2015   • CYSTOSCOPY URETEROSCOPY LASER LITHOTRIPSY     • KNEE SURGERY Left    • MITRAL VALVE REPAIR/REPLACEMENT  03/2015    annuloplasty ring, complex MV repair with significant intra-op and post-op complications   • OTHER SURGICAL HISTORY      Selective Arterial Catheterization    • PARATHYROID GLAND SURGERY  2014    Parathyroid resection                        PT Assessment/Plan     Row Name 10/10/22 1700          PT  Assessment    Assessment Comments Pt returns today for first f/u since initial eval. Reports daily HEP compliance although no change in s/s, unsure if exercises have been beneficial. Educated on long term benefits of PT, and allowing time to see if interventions will work. Also discussed role of TrA and translation to functional activities. Progressed core/hip strengthening challenges and updated HEP.  -CC        PT Plan    PT Plan Comments Consider adding s/l clam and STS  -CC           User Key  (r) = Recorded By, (t) = Taken By, (c) = Cosigned By    Initials Name Provider Type    Rosalee Westfall, PT Physical Therapist                   OP Exercises     Row Name 10/10/22 1500             Subjective Comments    Subjective Comments Pts sister present throughout treatment to assist with communication. Pt reports she has performed the exercises daily but there has been no change in back pain. Reports she has tried sitting in chair to watch TV rather than couch which has helped  -CC         Subjective Pain    Able to rate subjective pain? yes  -CC      Pre-Treatment Pain Level 6  -CC         Total Minutes    34359 - PT Therapeutic Exercise Minutes 38  -CC         Exercise 1    Exercise Name 1 nu step  -CC      Time 1 5 min  -CC         Exercise 2    Exercise Name 2 Yoav pose with lateral arms to R (L inc pain)  -CC      Cueing 2 Verbal;Tactile;Demo  -CC      Reps 2 3  -CC      Time 2 20 sec  -CC         Exercise 3    Exercise Name 3 Cat-Camel  -CC      Cueing 3 Verbal;Tactile;Demo  -CC      Reps 3 5  -CC      Time 3 5sec  -CC         Exercise 4    Exercise Name 4 LTR  -CC      Cueing 4 Verbal;Tactile  -CC      Reps 4 10  -CC      Time 4 5sec  -CC         Exercise 5    Exercise Name 5 HL SKC  -CC      Cueing 5 Tactile;Demo;Verbal  -CC      Reps 5 3  -CC      Time 5 20sec  -CC         Exercise 6    Exercise Name 6 piriformis str  -CC      Cueing 6 Verbal  -CC      Reps 6 3 nadia  -CC      Time 6 20 sec  -CC          Exercise 7    Exercise Name 7 HL PPT  -CC      Cueing 7 Verbal;Tactile;Demo  -CC      Reps 7 10  -CC      Time 7 3-5 sec  -CC      Additional Comments pt feels this exercise was a waste of tiem so education provided on reason behind the exercise and goal of carry over to future exercises and walking/standing wtih ADLs.  -CC         Exercise 8    Exercise Name 8 HL hip abd  -CC      Cueing 8 Verbal  -CC      Sets 8 2  -CC      Reps 8 10  -CC      Time 8 RTB  -CC         Exercise 9    Exercise Name 9 bridge  -CC      Cueing 9 Verbal  -CC      Sets 9 2  -CC      Reps 9 10  -CC      Time 9 RTB at knees, cues for TrA  -CC            User Key  (r) = Recorded By, (t) = Taken By, (c) = Cosigned By    Initials Name Provider Type    Rosalee Westfall, PT Physical Therapist                              PT OP Goals     Row Name 10/10/22 1500          PT Short Term Goals    STG Date to Achieve 10/13/22  -CC     STG 1 Patient will be independent and compliant with initial HEP. ne  -CC     STG 1 Progress Ongoing  -CC     STG 2 Patient will demonstrate correct posture in sitting and standing to decrease strain/pain on spine.  -CC     STG 2 Progress Ongoing  -CC     STG 3 Pt will demonstrate correct body mechanics with bending, reaching, and lifting ADL’s  -CC     STG 3 Progress Ongoing  -CC     STG 4 Pain will decrease from constant to intermittent.  -CC     STG 4 Progress Ongoing  -        Long Term Goals    LTG Date to Achieve 11/12/22  -CC     LTG 1 Pt will be independent with advanced HEP for spinal stabilization and LE/core strengthening.  -CC     LTG 1 Progress Ongoing  -CC     LTG 2 Pt will be able to perform ADLs with no more than 2/10 pain.  -CC     LTG 2 Progress Ongoing  -CC     LTG 3 Pt will demonstrate 4/5 or better strength throughout nadia LEs.  -CC     LTG 3 Progress Ongoing  -CC     LTG 4 Pt will have decreased score by 10% on Oswestry Disability Index indicating decrease in perceived functional  disability.  -CC     LTG 4 Progress Ongoing  -CC           User Key  (r) = Recorded By, (t) = Taken By, (c) = Cosigned By    Initials Name Provider Type    CC Rosalee Jovel, PT Physical Therapist                               Time Calculation:   Start Time: 1530  Stop Time: 1608  Time Calculation (min): 38 min  Total Timed Code Minutes- PT: 38 minute(s)  Timed Charges  58812 - PT Therapeutic Exercise Minutes: 38  Total Minutes  Timed Charges Total Minutes: 38   Total Minutes: 38  Therapy Charges for Today     Code Description Service Date Service Provider Modifiers Qty    14486381122  PT THER PROC EA 15 MIN 10/10/2022 Rosalee Jovel, PT GP 3                    Rosalee Jovel PT  10/10/2022

## 2022-10-12 ENCOUNTER — APPOINTMENT (OUTPATIENT)
Dept: PHYSICAL THERAPY | Facility: HOSPITAL | Age: 42
End: 2022-10-12

## 2022-10-17 ENCOUNTER — APPOINTMENT (OUTPATIENT)
Dept: PHYSICAL THERAPY | Facility: HOSPITAL | Age: 42
End: 2022-10-17

## 2022-10-19 ENCOUNTER — APPOINTMENT (OUTPATIENT)
Dept: PHYSICAL THERAPY | Facility: HOSPITAL | Age: 42
End: 2022-10-19

## 2022-10-24 ENCOUNTER — APPOINTMENT (OUTPATIENT)
Dept: PHYSICAL THERAPY | Facility: HOSPITAL | Age: 42
End: 2022-10-24

## 2022-10-26 ENCOUNTER — HOSPITAL ENCOUNTER (OUTPATIENT)
Dept: PHYSICAL THERAPY | Facility: HOSPITAL | Age: 42
Setting detail: THERAPIES SERIES
Discharge: HOME | End: 2022-10-26

## 2022-10-26 DIAGNOSIS — R29.898 WEAKNESS OF LOWER EXTREMITY, UNSPECIFIED LATERALITY: ICD-10-CM

## 2022-10-26 DIAGNOSIS — M25.60 DECREASED RANGE OF MOTION: ICD-10-CM

## 2022-10-26 DIAGNOSIS — M54.50 ACUTE BILATERAL LOW BACK PAIN WITHOUT SCIATICA: Primary | ICD-10-CM

## 2022-10-26 PROCEDURE — 97110 THERAPEUTIC EXERCISES: CPT

## 2022-10-27 NOTE — THERAPY PROGRESS REPORT/RE-CERT
Outpatient Physical Therapy Ortho Progress Note  [facility]     Patient Name: Ania Reyes  : 1980  MRN: [id number]  Today's Date: 10/26/2022      Visit Date: 10/26/2022    Patient Active Problem List   Diagnosis   • Spastic hemiplegia affecting right dominant side (HCC)   • Pain in right hand   • Muscle hypertonicity   • History of stroke   • History of repair of mitral valve   • High risk medication use   • Dyslipidemia   • Allergic rhinitis   • Right hemiplegia ([facility])   • Osteopenia   • Vitamin D deficiency   • History of parathyroid surgery   • Abnormality of gait following cerebrovascular accident (CVA)   • Left flank pain   • Hydroureteronephrosis   • Unintentional weight loss   • Dizziness   • Heart failure ([facility])   • Hypercalcemia   • Methicillin resistant Staphylococcus aureus infection   • Seizure ([facility])   • Anxiety   • Moderate episode of recurrent major depressive disorder ([facility])   • Pain disorder associated with psychological factors   • Acute bilateral low back pain without sciatica        Past Medical History:   Diagnosis Date   • Allergic rhinitis    • Aphasia    • BMI 22.0-22.9, adult    • Burning with urination 2020   • Cerebral infarction due to embolism of left middle cerebral artery ([facility])    • Depression with anxiety    • Dyslipidemia    • Embolism of left middle cerebral artery 2015   • Encounter for immunization    • Fatigue    • 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 ([facility]) 2015    Description: 2015 - (N) Coronaries - LVEF 55% - Severe prolapse of the anterior & posterior mitral leaflet with severe MR,   • Functional cardiac murmur 2014   • Gingival enlargement 2016   • Gum hypertrophy    • Heart failure ([facility]) 2021    NYHA class 3 NYHA class 3 NYHA class 3 NYHA class 3   • High risk medication use    • History of acute sinusitis    • History of dizziness    • History of echocardiogram    • History of  hyperparathyroidism    • History of kidney stones     Bilateral   • History of low back pain    • History of mitral valve repair     [facility] annuloplasty ring, complex MV repair with significant intra-op and post op complications   • History of nephrolithiasis    • History of stroke     LMCA; right hemiplegia   • History of transesophageal echocardiography ([facility])    • Kidney stone     3mm    • Lactase deficiency    • Left nephrolithiasis 8/12/2019   • Methicillin resistant Staphylococcus aureus infection 3/13/2015    No A2K system hx - now +MRSA sputum on 3/13/2015 No A2K system hx - now +MRSA sputum on 3/13/2015 No A2K system hx - now +MRSA sputum on 3/13/2015 No A2K system hx - now +MRSA sputum on 3/13/2015   • Mitral regurgitation    • Mitral valve prolapse    • Muscle hypertonicity    • Need for SBE (subacute bacterial endocarditis) prophylaxis 8/12/2019   • Osteopenia    • Pain in right hand    • Pain, wrist joint    • Parathyroid adenoma    • Positive depression screening    • Primary hyperparathyroidism (HCC)    • Right arm pain    • Right hemiplegia ([facility]) 5/28/2015   • Seizures ([facility])    • Sinus tachycardia    • Spastic hemiplegia affecting dominant side ([facility])    • Vitamin D deficiency         Past Surgical History:   Procedure Laterality Date   • CARDIAC SURGERY  2015   • CYSTOSCOPY URETEROSCOPY LASER LITHOTRIPSY     • KNEE SURGERY Left    • MITRAL VALVE REPAIR/REPLACEMENT  03/2015    annuloplasty ring, complex MV repair with significant intra-op and post-op complications   • OTHER SURGICAL HISTORY      Selective Arterial Catheterization    • PARATHYROID GLAND SURGERY  2014    Parathyroid resection       Visit Dx:     ICD-10-CM ICD-9-CM   1. Acute bilateral low back pain without sciatica  M54.50 724.2     338.19   2. Decreased range of motion  M25.60 719.50   3. Weakness of lower extremity, unspecified laterality  R29.898 729.89                             Therapy Education  Education Details:  printed new program  Given: HEP, Posture/body mechanics, Mobility training  Program: Progressed  How Provided: Verbal, Demonstration, Written  Provided to: Patient  Level of Understanding: Verbalized, Demonstrated      PT OP Goals     Row Name 10/26/22 1400          PT Short Term Goals    STG Date to Achieve 10/13/22  -ELVIA     STG 1 Patient will be independent and compliant with initial HEP. ne  -ELVIA     STG 1 Progress Met  -ELVIA     STG 2 Patient will demonstrate correct posture in sitting and standing to decrease strain/pain on spine.  -JA     STG 2 Progress Partially Met  -     STG 3 Pt will demonstrate correct body mechanics with bending, reaching, and lifting ADL’s  -JA     STG 3 Progress Progressing  -     STG 4 Pain will decrease from constant to intermittent.  -ELVIA     STG 4 Progress Progressing  -        Long Term Goals    LTG Date to Achieve 11/12/22  -ELVIA     LTG 1 Pt will be independent with advanced HEP for spinal stabilization and LE/core strengthening.  -ELVIA     LTG 1 Progress Ongoing  -ELVIA     LTG 2 Pt will be able to perform ADLs with no more than 2/10 pain.  -ELVIA     LTG 2 Progress Ongoing  -ELVIA     LTG 3 Pt will demonstrate 4/5 or better strength throughout nadia LEs.  -     LTG 3 Progress Ongoing  -     LTG 4 Pt will have decreased score by 10% on Oswestry Disability Index indicating decrease in perceived functional disability.  -ELVIA     LTG 4 Progress Ongoing  -ELVIA           User Key  (r) = Recorded By, (t) = Taken By, (c) = Cosigned By    Initials Name Provider Type    Yolis Berkowitz, PT Physical Therapist                 PT Assessment/Plan     Row Name 10/26/22 1500          PT Assessment    Assessment Comments Ania has been seen for evvaluation and 2 follow-up visits for acute LBP of insidious onset.  She reports performing her HEP fairly regularly and making changes to her sitting position whiile watching television so that doesn't bother her now.  We updated and printed HEP and pt  responded well to the new ex's involving more movement/mobility whle keeping focused on abdominal activation. She is progressing toward goals with 2/4 STGs met/partially met and 4/4 LTGs ongoing. She will benefit from continuing skilled therapy services.  -ELVIA        PT Plan    PT Plan Comments assess responnse and familiarity with updated HEP, reinforce HEP, assess whether we need to schedule more visits  -ELVIA           User Key  (r) = Recorded By, (t) = Taken By, (c) = Cosigned By    Initials Name Provider Type    Yolis Berkowitz, PT Physical Therapist                      10/26/22 1400   Subjective Comments   Subjective Comments Rates pain 5.   Subjective Pain   Able to rate subjective pain? yes   Pre-Treatment Pain Level 5   Total Minutes   34222 - PT Therapeutic Exercise Minutes 40   Exercise 1   Exercise Name 1 nu step   Time 1 5 min   Exercise 2   Exercise Name 2 Discussed pt's symptoms, progress, and activity. Revised and printed HEP and performed here to teach and reinforce. Progressed from stretching/strengthening in supported positions to primarily   Exercise 3   Exercise Name 3 Bridge w/TA   Cueing 3 Verbal;Tactile   Reps 3 10   Time 3 3 sec   Exercise 4   Exercise Name 4 standing heel and toe raises   Cueing 4 Demo   Reps 4 10ea   Exercise 5   Exercise Name 5 side stepping   Cueing 5 Tactile   Reps 5 3 laps   Exercise 6   Exercise Name 6 scap ret with tband   Cueing 6 Verbal;Tactile;Demo   Reps 6 10   Time 6 3sec   Exercise 7   Exercise Name 7 FW/BW walking   Cueing 7 Demo;Verbal   Reps 7 3 laps near barre in case she needs to steady herself   Exercise 8   Exercise Name 8 lateral lunge with same side arm reach   Cueing 8 Demo;Verbal   Reps 8 alternated L/R 10 times   Exercise 9   Exercise Name 9 step FW toward wall and slide nadia shd   Cueing 9 Verbal;Demo   Sets 9 2   Reps 9 10   Time 9 one set ea with R/L   Exercise 10   Exercise Name 10 supine 90/90 decompression positioning   Cueing 10  Verbal;Tactile;Demo   Reps 10 reinforced doing for 10 min a day                               Time Calculation:     Start Time: 1416  Stop Time: 1500  Time Calculation (min): 44 min  Timed Charges  69733 - PT Therapeutic Exercise Minutes: 40  Total Minutes  Timed Charges Total Minutes: 40   Total Minutes: 40     Therapy Charges for Today     Code Description Service Date Service Provider Modifiers Qty    33484004995 HC PT THER PROC EA 15 MIN 10/26/2022 Yolis Marks, PT GP 3                    Yolis Marks PT  10/27/2022

## 2022-10-31 ENCOUNTER — APPOINTMENT (OUTPATIENT)
Dept: PHYSICAL THERAPY | Facility: HOSPITAL | Age: 42
End: 2022-10-31

## 2022-11-02 ENCOUNTER — APPOINTMENT (OUTPATIENT)
Dept: PHYSICAL THERAPY | Facility: HOSPITAL | Age: 42
End: 2022-11-02

## 2022-11-21 DIAGNOSIS — M79.641 PAIN IN RIGHT HAND: ICD-10-CM

## 2022-11-21 DIAGNOSIS — M62.89 MUSCLE HYPERTONICITY: ICD-10-CM

## 2022-11-21 DIAGNOSIS — G81.10 SPASTIC HEMIPLEGIA AFFECTING DOMINANT SIDE: ICD-10-CM

## 2022-11-22 RX ORDER — BACLOFEN 10 MG/1
TABLET ORAL
Qty: 180 TABLET | Refills: 0 | Status: SHIPPED | OUTPATIENT
Start: 2022-11-22 | End: 2022-12-06 | Stop reason: SDUPTHER

## 2022-11-22 NOTE — TELEPHONE ENCOUNTER
Rx Refill Note  Requested Prescriptions     Pending Prescriptions Disp Refills   • baclofen (LIORESAL) 10 MG tablet [Pharmacy Med Name: Baclofen 10 MG Oral Tablet] 180 tablet 0     Sig: TAKE 1 TABLET BY MOUTH TWICE DAILY AS NEEDED FOR MUSCLE SPASM      Last office visit with prescribing clinician: 7/25/2022      Next office visit with prescribing clinician: 12/6/2022            Rosalee Martinez MA  11/22/22, 13:23 EST

## 2022-12-02 ENCOUNTER — TELEMEDICINE (OUTPATIENT)
Dept: BEHAVIORAL HEALTH | Facility: CLINIC | Age: 42
End: 2022-12-02

## 2022-12-02 DIAGNOSIS — F33.42 RECURRENT MAJOR DEPRESSIVE DISORDER, IN FULL REMISSION: Primary | ICD-10-CM

## 2022-12-02 PROCEDURE — 99213 OFFICE O/P EST LOW 20 MIN: CPT | Performed by: NURSE PRACTITIONER

## 2022-12-02 NOTE — PROGRESS NOTES
"Subjective   Ania Reyes is a 42 y.o. female who presents today for follow up    This provider is located at [address] [place] [address], [address], [place] [address]. The Patient is seen remotely using [employer]. Patient is being seen via telehealth and confirm that they are in a secure environment for this session. The patient's condition being diagnosed/treated is appropriate for telemedicine. The provider identified himself/herself: herself as well as her credentials.   The patient and mother gave consent to be seen remotely, and when consent is given they understand that the consent allows for patient identifiable information to be sent to a third party as needed.   They may refuse to be seen remotely at any time. The electronic data is encrypted and password protected, and the patient has been advised of the potential risks to privacy not withstanding such measures.    You have chosen to receive care through a telehealth visit.  Do you consent to use a video/audio connection for your medical care today? Yes    Referring Provider:  Cinthia Mark, APRN  [address] [place] [address]    Chief Complaint:   medication check     History of Present Illness:    12/2/22:  Patient presents today via [employer] Video visit from home with mother Haydee, patient reports now living with mother in [place], [place].  Patient mother assisting with communication, and patient moved back approximately 1 month ago. Mother's dog has had puppies and had told patient she had to come to stay with mother to get a puppy.     Patient reports she is doing good, in regards to appetite and weight, mother confirms appetite has improved due to mother cooking and patient has started playing cards with group of friends at house, which patient enjoys.  On Mondays, they have a girl's day out and patient attends. \"I don't let her sit too much\".  Patient mother does not have a scale, and will have weight obtained next Tues at PCP " "appointment.      Patient has had increased activity as mother has patient doing several household chores, helping with puppies which is helpful and patient enjoys.        9/2/22:  Patient presents today via [employer] Video visit from [facility] in [place] with sister Bianca. Patient reports move to [place] went well as patient is now living with sister and sister's son.      Patient reports current medication, Cymbalta has been effective in management of both depression and anxiety.  Denies sleep problems, nor side effects.   Rates self a 4 out 10 for depression, and 4 out of 10 for anxiety.    Denies seizure activity.   Per sister patient is \"upbeat about things, I have to stay on her about eating\". Weight gain of 5 lb since last visit.        6/2/22: Patient presents today in office with sister, Nicolette.   Patient has not eaten for 7 days, however, sister was able to get patient to eat last night and today.  Patient was out of Gabapentin which caused nausea, was able to  medication yesterday.  Apparently there was a discrepancy with the date and pharmacy. Patient will be moving back to [place] on Monday with sister, Bianca.  Patient has noticed clothes are loose, weight trend reviewed.  Due to heart condition patient is not to weigh more than 140 lbs.     Since patient began splitting dose of Cymbalta to 60 mg every morning and 30 mg every night patient no longer sleeping all day,  Patient feels overall mood has improved.  Sister has noticed positive changes, as patient interacts more, no longer just nodding head, now engaging more with others, and laughing.     Patient will be living off [facility] [place] by [facility] And wishes to do video visits due to length of drive.      5/5/22: Patient presents today in office with mother, Haydee.  Patient reports since increase of Cymbalta had 2 weeks of being awake all day then had 2 weeks of sleeping all day.  \"she seems to want to sleep all the time.\"  " "\"today I am tired.\" Mother reports this past Monday patient slept all day, and woke up an hour before bedtime, and went back to sleep.     Patient has moved in with mother completely, sister is trying to find a house and patient plans to move in with sister.  Patient likes living with mother though does not like the country.  Patient plans to move in with other sister, Bianca, which will be in [place].   Patient plans to move on 5/31/22.     Patient declines therapy due to aphasia.  Patient feels mood has improved though difficult to determine due to increased sleep pattern.      3/31/22: INITIAL EVAL  Patient presents today in office with sister, Nicolette Johnson, with a history of depression and anxiety.  Patient suffered a stroke 2015, first seizure occured 6 yrs after stroke in July or August of 2021, and 4 weeks later had another seizure.  Started on Keppra 9/2021. Currently is followed at Epilepsy clinic and by neurologist.  Patient with right hemiplegia, rle weakness, stability problems occasionally,  aphasia, and limited fine motor to right hand.     Patient dog, Rama, passed away 1 month ago, 2/27/22, from cancer at 5 yrs old.  Patient has been having difficulty coping, as patient did not eat for the first 4 days after, and this past Monday and Tues patient went without eating. Minimal oral intake, drinking big red more than water.  Patient admits to clothes not fitting as well since food deprivation.   Patient admits to anhedonia, crying often, having difficulty getting out of bed, increased desire to sleep, and lacking motivation.     Stressors: [place], spouse who passed away 2 yrs ago, dog passing away, and losing home. Patient sister reports patient was a victim of cat fishing and now house has to be sold as patient sent out [phone] to a boyfriend online.  Patient reports being with boyfriend for 1 yr and 6 months, he was in the , all communication was through online social media, no " telephone or video communication.     Patient appeared to do well with loss of , cat fishing episode, but when Rama was put down patient shut down.      Patient will be moving to [place] with mother in approximately 1 month, house has been sold, and all belongings must be out by April 27.  Sister lives in [place], [place]. And assists with medical care and communication.      Before stroke patient only took vitamin d, has been on Cymbalta since after stroke for pain with nerves to right hand/arm prescribed by neurologist takes in the morning currently. Uncertain of start date, though, sister confirms for several years.           PHQ-9 Depression Screening  PHQ-9 Total Score:  12/2/2022 0 , reassess with next visit    Little interest or pleasure in doing things? 0-->not at all   Feeling down, depressed, or hopeless? 0-->not at all   Trouble falling or staying asleep, or sleeping too much?     Feeling tired or having little energy?     Poor appetite or overeating?     Feeling bad about yourself - or that you are a failure or have let yourself or your family down?     Trouble concentrating on things, such as reading the newspaper or watching television?     Moving or speaking so slowly that other people could have noticed? Or the opposite - being so fidgety or restless that you have been moving around a lot more than usual?     Thoughts that you would be better off dead, or of hurting yourself in some way?     PHQ-9 Total Score 0     CALIN-7  Feeling nervous, anxious or on edge: Not at all  Not being able to stop or control worrying: Not at all  Worrying too much about different things: Not at all  Trouble Relaxing: Not at all  Being so restless that it is hard to sit still: Not at all  Feeling afraid as if something awful might happen: Not at all  Becoming easily annoyed or irritable: Not at all  CALIN 7 Total Score: 0  If you checked any problems, how difficult have these problems made it for you to do your  work, take care of things at home, or get along with other people: Not difficult at all     Past Surgical History:  Past Surgical History:   Procedure Laterality Date   • CARDIAC SURGERY  2015   • CYSTOSCOPY URETEROSCOPY LASER LITHOTRIPSY     • KNEE SURGERY Left    • MITRAL VALVE REPAIR/REPLACEMENT  03/2015    annuloplasty ring, complex MV repair with significant intra-op and post-op complications   • OTHER SURGICAL HISTORY      Selective Arterial Catheterization    • PARATHYROID GLAND SURGERY  2014    Parathyroid resection       Problem List:  Patient Active Problem List   Diagnosis   • Spastic hemiplegia affecting dominant side (HCC)   • Pain in right hand   • Muscle hypertonicity   • History of stroke   • History of repair of mitral valve   • High risk medication use   • Dyslipidemia   • Allergic rhinitis   • Right hemiplegia (HCC)   • Osteopenia   • Vitamin D deficiency   • History of parathyroid surgery   • Abnormality of gait following cerebrovascular accident (CVA)   • Unintentional weight loss   • Dizziness   • Heart failure ([facility])   • Hypercalcemia   • Methicillin resistant Staphylococcus aureus infection   • Seizure ([facility])   • Anxiety   • Moderate episode of recurrent major depressive disorder ([facility])   • Pain disorder associated with psychological factors   • Cold intolerance       Allergy:   No Known Allergies     Discontinued Medications:  Medications Discontinued During This Encounter   Medication Reason   • levETIRAcetam (KEPPRA) 500 MG tablet *Therapy completed       Current Medications:   Current Outpatient Medications   Medication Sig Dispense Refill   • aspirin 81 MG tablet Take 81 mg by mouth Daily.     • atorvastatin (LIPITOR) 20 MG tablet Take 20 mg by mouth Daily.     • diazePAM (DIASTAT ACUDIAL) 20 MG rectal kit INSERT 12.5MG INTO THE RECTUM ONCE FOR 1 DOSE FOR GENERALIZED SEIZURE LASTING OVER 3 MINUTES.     • DULoxetine (CYMBALTA) 30 MG capsule Take 2 capsules by mouth Every Morning AND 1  capsule Every Night. Do all this for 180 days. Indications: Generalized Anxiety Disorder, Major Depressive Disorder 270 capsule 1   • famotidine (PEPCID) 20 MG tablet Take 20 mg by mouth Daily.     • gabapentin (NEURONTIN) 600 MG tablet Take 600 mg by mouth 4 (Four) Times a Day.  5   • levETIRAcetam (KEPPRA) 500 MG tablet Take 500 mg by mouth 2 (Two) Times a Day.     • metoprolol succinate XL (TOPROL-XL) 25 MG 24 hr tablet Take 25 mg by mouth Daily.  3   • Multiple Vitamins-Minerals (MULTIVITAMIN ADULTS PO) Take 1 tablet by mouth Daily.     • potassium chloride (K-DUR) 10 MEQ CR tablet Take 10 mEq by mouth Daily.     • VITAMIN D, CHOLECALCIFEROL, PO Take 1 tablet by mouth Daily.     • baclofen (LIORESAL) 10 MG tablet Take 1 tablet by mouth 2 (Two) Times a Day As Needed for Muscle Spasms. 180 tablet 0   • diclofenac (VOLTAREN) 50 MG EC tablet Take 1 tablet by mouth 3 (Three) Times a Day As Needed (pain). for pain 270 tablet 1     No current facility-administered medications for this visit.       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 ([facility]) 05/11/2015    Description: 02- - (N) Coronaries - LVEF 55% - Severe prolapse of the anterior & posterior mitral leaflet with severe MR,   • Functional cardiac murmur 04/14/2014   • Gingival enlargement 11/17/2016   • Gum hypertrophy    • Heart failure ([facility]) 08/13/2021    NYHA class 3 NYHA class 3 NYHA class 3 NYHA class 3   • High risk medication use    • History of acute sinusitis    • History of  dizziness    • History of echocardiogram    • History of hyperparathyroidism    • History of kidney stones     Bilateral   • History of low back pain    • History of mitral valve repair     [employer] annuloplasty ring, complex MV repair with significant intra-op and post op complications   • History of nephrolithiasis    • History of stroke     LMCA; right hemiplegia   • History of transesophageal echocardiography ([facility])    • Hydroureteronephrosis 2020 CT abd/pelvis: 4-5 mm obstructing stone located in the distal left ureter with mild-moderate left hydroureteronephrosis; tiny nonobstructing stones elsewhere in both kidneys   • Kidney stone     3mm    • Lactase deficiency    • Left flank pain 2020   • Left nephrolithiasis 2019   • Methicillin resistant Staphylococcus aureus infection 2015    No A2K system hx - now +MRSA sputum on 3/13/2015 No A2K system hx - now +MRSA sputum on 3/13/2015 No A2K system hx - now +MRSA sputum on 3/13/2015 No A2K system hx - now +MRSA sputum on 3/13/2015   • Mitral regurgitation    • Mitral valve prolapse    • Muscle hypertonicity    • Need for SBE (subacute bacterial endocarditis) prophylaxis 2019   • Osteopenia    • Pain in right hand    • Pain, wrist joint    • Parathyroid adenoma    • Positive depression screening    • Primary hyperparathyroidism (HCC)    • Right arm pain    • Right hemiplegia ([facility]) 2015   • Seizures ([facility])    • Sinus tachycardia    • Spastic hemiplegia affecting dominant side ([facility])    • Stroke (HCC) May 2015   • Vitamin D deficiency         Past Psychiatric History:  Began Treatment:3/31/22  Diagnoses:Depression and Anxiety  Psychiatrist:Denies  Therapist:Denies  Admission History:Denies  Medication Trials: after stroke for 6 months, unable to recal name  Self Harm: Denies  Suicide Attempts:Denies   Psychosis, Anxiety, Depression: n/a    Substance Abuse History:   Types:Denies all, including  illicit  Withdrawal Symptoms:Denies  Longest Period Sober:Not Applicable   AA: Not applicable     Social History:  Martial Status:Single  for 2 yrs   Employed:No disabled  Kids:No  House:Lives in a house with mother in [place], [place] since approximately early 2022    History: Denies  Access to Guns:  no    Social History     Socioeconomic History   • Marital status:    Tobacco Use   • Smoking status: Never   • Smokeless tobacco: Never   Vaping Use   • Vaping Use: Never used   Substance and Sexual Activity   • Alcohol use: Yes     Alcohol/week: 1.0 standard drink     Types: 1 Drinks containing 0.5 oz of alcohol per week     Comment: social drinker   • Drug use: Never   • Sexual activity: Yes     Partners: Male     Birth control/protection: None       Family History:   Suicide Attempts: Denies  Suicide Completions:Denies      Family History   Problem Relation Age of Onset   • Depression Mother    • Anxiety disorder Mother    • Coronary artery disease Mother    • Glaucoma Mother    • Nephrolithiasis Mother    • Heart disease Mother    • No Known Problems Father    • Anxiety disorder Sister    • Breast cancer Maternal Aunt         diagnosed in 40s   • Ovarian cancer Maternal Aunt    • Osteoporosis Maternal Aunt    • Coronary artery disease Maternal Grandfather    • Heart attack Maternal Grandfather          at age 54 years   • Colon cancer Maternal Grandmother         diagnosed in her 60's   • Breast cancer Maternal Grandmother    • Ovarian cancer Maternal Grandmother    • Colon cancer Paternal Grandfather    • No Known Problems Other        Developmental History:   Born: [place]  Siblings:1 brother, 1 sister  Childhood: Denies Abuse  High School:Completed  College:almost completed nursing, due to stroke (1/2 yr left)    Mental Status Exam:   Hygiene:   good  Cooperation:  Cooperative  Eye Contact:  Good  Psychomotor Behavior:  Appropriate  Affect:  Appropriate  Mood: euthymic  Speech:   Aphasic  Thought Process:  Goal directed  Thought Content:  Mood congruent  Suicidal:  None  Homicidal:  None  Hallucinations:  None  Delusion:  None  Memory:  Intact  Orientation:  Person, Place, Time and Situation  Reliability:  good  Insight:  Good  Judgement:  Good  Impulse Control:  Good  Physical/Medical Issues:  Yes Stroke with rt sided weakness, HLD, Sz 6 yrs post stroke, orthostatic hypotension, s/p MVR, spastic hemiplegia to rt dominant hand     Review of Systems:  Review of Systems   Constitutional: Positive for appetite change. Negative for chills, diaphoresis, fatigue and fever.        Increased since move [place] [facility] [place]   HENT: Negative for drooling, ear pain, postnasal drip, rhinorrhea and sore throat.    Eyes: Negative for visual disturbance.   Respiratory: Negative for cough, shortness of breath and wheezing.    Cardiovascular: Negative for chest pain, palpitations and leg swelling.   Gastrointestinal: Negative for nausea and vomiting.   Endocrine: Negative for cold intolerance and heat intolerance.   Genitourinary: Negative for difficulty urinating.   Musculoskeletal: Negative for joint swelling and myalgias.   Skin: Negative for rash.   Allergic/Immunologic: Negative for immunocompromised state.   Neurological: Positive for speech difficulty. Negative for dizziness, seizures, numbness and headaches.   Psychiatric/Behavioral: Negative for decreased concentration, hallucinations, self-injury, sleep disturbance and suicidal ideas. The patient is not nervous/anxious.          Physical Exam:  Physical Exam  Psychiatric:         Attention and Perception: Attention and perception normal.         Mood and Affect: Mood and affect normal.         Speech: Speech is delayed.         Behavior: Behavior normal. Behavior is cooperative.         Thought Content: Thought content normal. Thought content does not include suicidal ideation. Thought content does not include suicidal plan.         Cognition and  Memory: Cognition and memory normal.         Judgment: Judgment normal.      Comments: Aphasic secondary to stroke         Vital Signs:   There were no vitals taken for this visit.     Lab Results:   Office Visit on 07/25/2022   Component Date Value Ref Range Status   • Glucose 07/25/2022 70  65 - 99 mg/dL Final   • BUN 07/25/2022 15  6 - 20 mg/dL Final   • Creatinine 07/25/2022 0.81  0.57 - 1.00 mg/dL Final   • EGFR Result 07/25/2022 95  >59 mL/min/1.73 Final   • BUN/Creatinine Ratio 07/25/2022 19  9 - 23 Final   • Sodium 07/25/2022 141  134 - 144 mmol/L Final   • Potassium 07/25/2022 4.2  3.5 - 5.2 mmol/L Final   • Chloride 07/25/2022 104  96 - 106 mmol/L Final   • Total CO2 07/25/2022 25  20 - 29 mmol/L Final   • Calcium 07/25/2022 9.4  8.7 - 10.2 mg/dL Final   • Total Protein 07/25/2022 6.5  6.0 - 8.5 g/dL Final   • Albumin 07/25/2022 4.4  3.8 - 4.8 g/dL Final   • Globulin 07/25/2022 2.1  1.5 - 4.5 g/dL Final   • A/G Ratio 07/25/2022 2.1  1.2 - 2.2 Final   • Total Bilirubin 07/25/2022 0.3  0.0 - 1.2 mg/dL Final   • Alkaline Phosphatase 07/25/2022 88  44 - 121 IU/L Final   • AST (SGOT) 07/25/2022 28  0 - 40 IU/L Final   • ALT (SGPT) 07/25/2022 30  0 - 32 IU/L Final   • WBC 07/25/2022 6.8  3.4 - 10.8 x10E3/uL Final   • RBC 07/25/2022 4.30  3.77 - 5.28 x10E6/uL Final   • Hemoglobin 07/25/2022 12.8  11.1 - 15.9 g/dL Final   • Hematocrit 07/25/2022 40.0  34.0 - 46.6 % Final   • MCV 07/25/2022 93  79 - 97 fL Final   • MCH 07/25/2022 29.8  26.6 - 33.0 pg Final   • MCHC 07/25/2022 32.0  31.5 - 35.7 g/dL Final   • RDW 07/25/2022 11.9  11.7 - 15.4 % Final   • Platelets 07/25/2022 221  150 - 450 x10E3/uL Final   • Neutrophil Rel % 07/25/2022 71  Not Estab. % Final   • Lymphocyte Rel % 07/25/2022 22  Not Estab. % Final   • Monocyte Rel % 07/25/2022 5  Not Estab. % Final   • Eosinophil Rel % 07/25/2022 1  Not Estab. % Final   • Basophil Rel % 07/25/2022 1  Not Estab. % Final   • Neutrophils Absolute 07/25/2022 4.9  1.4 - 7.0  x10E3/uL Final   • Lymphocytes Absolute 07/25/2022 1.5  0.7 - 3.1 x10E3/uL Final   • Monocytes Absolute 07/25/2022 0.3  0.1 - 0.9 x10E3/uL Final   • Eosinophils Absolute 07/25/2022 0.1  0.0 - 0.4 x10E3/uL Final   • Basophils Absolute 07/25/2022 0.1  0.0 - 0.2 x10E3/uL Final   • Immature Granulocyte Rel % 07/25/2022 0  Not Estab. % Final   • Immature Grans Absolute 07/25/2022 0.0  0.0 - 0.1 x10E3/uL Final   • Total Cholesterol 07/25/2022 168  100 - 199 mg/dL Final   • Triglycerides 07/25/2022 194 (H)  0 - 149 mg/dL Final   • HDL Cholesterol 07/25/2022 45  >39 mg/dL Final   • VLDL Cholesterol Miles 07/25/2022 33  5 - 40 mg/dL Final   • LDL Chol Calc ([facility]) 07/25/2022 90  0 - 99 mg/dL Final   • LDL/HDL RATIO 07/25/2022 2.0  0.0 - 3.2 ratio Final    Comment:                                     LDL/HDL Ratio                                              Men  Women                                1/2 Avg.Risk  1.0    1.5                                    Avg.Risk  3.6    3.2                                 2X Avg.Risk  6.2    5.0                                 3X Avg.Risk  8.0    6.1     • Color 07/25/2022 Yellow  Yellow, Straw, Dark Yellow, Ania Final   • Clarity, UA 07/25/2022 Clear  Clear Final   • Specific Gravity  07/25/2022 1.020  1.005 - 1.030 Final   • pH, Urine 07/25/2022 6.0  5.0 - 8.0 Final   • Leukocytes 07/25/2022 Negative  Negative Final   • Nitrite, UA 07/25/2022 Negative  Negative Final   • Protein, POC 07/25/2022 Negative  Negative mg/dL Final   • Glucose, UA 07/25/2022 Negative  Negative mg/dL Final   • Ketones, UA 07/25/2022 Negative  Negative Final   • Urobilinogen, UA 07/25/2022 Normal  Normal Final   • Bilirubin 07/25/2022 Negative  Negative Final   • Blood, UA 07/25/2022 Negative  Negative Final   • Lot Number 07/25/2022 [id number]   Final   • Expiration Date 07/25/2022 1/8/23   Final       EKG Results:  No orders to display       Imaging Results:  XR Chest PA & Lateral    Result Date:  3/13/2022  No focal pulmonary consolidation. Follow-up as clinical indications persist.  This report was finalized on 3/13/2022 10:34 AM by Dr. Meliton Simental M.D.          Assessment & Plan   Diagnoses and all orders for this visit:    1. Recurrent major depressive disorder, in full remission (HCC) (Primary)        Visit Diagnoses:    ICD-10-CM ICD-9-CM   1. Recurrent major depressive disorder, in full remission (HCC)  F33.42 296.36       PLAN:  1. Safety: No acute safety concerns  2. Therapy: Declines   3. Risk Assessment: Risk of self-harm acutely is low.  Risk factors include anxiety disorder, mood disorder, and recent psychosocial stressors (pandemic). Protective factors include no family history, denies access to guns/weapons, no present SI, no history of suicide attempts or self-harm in the past, minimal AODA, healthcare seeking, future orientation, willingness to engage in care.  Risk of self-harm chronically is also low, but could be further elevated in the event of treatment noncompliance and/or AODA.  4. Meds: Continue Cymbalta 60 mg by mouth daily every morning and 30 mg by mouth nightly to target chronic pain as previously ordered per neurologist and depression with anxiety.  Mother to contact new pharmacy in [place] for transfer of medications.     5. Labs: n/a    Overall mood improved, symptoms of anxiety and depression are under good control with Cymbalta.  New living arrangement with mother has been positive. Mother given direct contact number to [place] office.   Patient to contact provider if symptoms worsen or fail to improve.       9/2/22:   Declines therapy  Continue Cymbalta 60 mg by mouth daily every morning and 30 mg by mouth nightly  to target chronic pain as previously ordered per neurologist and depression with anxiety.  Patient has adequate refills until early Oct. For which patient has been instructed to contact pharmacy for refill.  Updated patient pharmacies   Patient doing  well, mood improved, tolerating Cymbalta without side effects, continues to struggle with appetite, though noted 5 lb weight gain since last visit per EHR which noted weight of 139 7/25/22.  Will see patient back in 3 months or sooner if symptoms worsen or fail to improve.     6/2/22:   -Continue Cymbalta 60 mg by mouth daily every morning and 30 mg by mouth nightly  to target chronic pain as previously ordered per neurologist and depression with anxiety.     Patient doing well with current medication regimen, will be moving to [place] with sister soon.  Discussed option for telehealth video visits and patient agrees with plan, informed patient and sister of need to have an in person visit every 12 months per medicare guidelines.     5/5/22:   Change Cymbalta from 90 mg by mouth daily to 60 mg by mouth every morning and 30 mg by mouth nightly  to target chronic pain as previously ordered per neurologist and depression with anxiety.  Patient experiencing increased sedation with one total dose of 90 mg, will split doses. Overall mood improved with increase thus far.  Therapy-offered and encouraged today; patient to contact provider if later decides; would need one in [place] as patient will be relocating at the end of the month    3/31/22:   Increase Cymbalta from 60 mg to 90 mg by mouth daily in the morning to target chronic pain as previously ordered per neurologist and depression with anxiety. Discussed all risks, benefits, alternatives, and side effects of Duloxetine including but not limited to GI upset, sexual dysfunction, bleeding risk, seizure risk, weight loss, insomnia, diaphoresis, drowsiness, headache, dizziness, fatigue, activation of radha or hypomania, increased fragility fracture risk, hyponatremia, increased BP, hepatotoxicity, ocular effects, withdrawal syndrome following abrupt discontinuation, serotonin syndrome, and activation of suicidal ideation and behavior. Pt educated on the need to  practice safe sex while taking this med. Discussed the need for pt to immediately call the office for any new or worsening symptoms, such as worsening depression; feeling nervous or restless; suicidal thoughts or actions; or other changes changes in mood or behavior, and all other concerns. Pt educated on med compliance and the risks of suddenly stopping this medication or missing doses. Pt verbalized understanding and is agreeable to taking Duloxetine. Addressed all questions and concerns.  Will see patient back in 5 weeks as patient will be moving out of home into mother's home and must have all belongings out of home by 4/27/22.        Patient screened positive for depression based on a PHQ-9 score of 0 on 12/2/2022. Follow-up recommendations include: Prescribed antidepressant medication treatment and Suicide Risk Assessment performed.       TREATMENT PLAN/GOALS: Continue supportive psychotherapy efforts and medications as indicated. Treatment and medication options discussed during today's visit. Patient acknowledged and verbally consented to continue with current treatment plan and was educated on the importance of compliance with treatment and follow-up appointments.    MEDICATION ISSUES:  LAMONTE reviewed as expected.  Discussed medication options and treatment plan of prescribed medication as well as the risks, benefits, and side effects including potential falls, possible impaired driving and metabolic adversities among others. Patient is agreeable to call the office with any worsening of symptoms or onset of side effects. Patient is agreeable to call 911 or go to the nearest ER should he/she begin having SI/HI. No medication side effects or related complaints today.     MEDS ORDERED DURING VISIT:  No orders of the defined types were placed in this encounter.      Return in about 6 months (around 6/2/2023) for Video visit, Next scheduled follow up.         I spent 28 minutes caring for Ania on this date of  service. This time includes time spent by me in the following activities: preparing for the visit, performing a medically appropriate examination and/or evaluation, counseling and educating the patient/family/caregiver, referring and communicating with other health care professionals and documenting information in the medical record.      This document has been electronically signed by YONG Aranda  January 17, 2023 12:41 EST      Part of this note may be an electronic transcription/translation of spoken language to printed text using the Dragon Dictation System.

## 2022-12-02 NOTE — PATIENT INSTRUCTIONS
"1.  Please return to clinic at your next scheduled visit.  Contact the [facility] ([phone]) or **Latonya, Medical Assistant at [place] Office directly at [phone] at least 24 hours prior in the event you need to cancel.**    2. Should you want to get in touch with your provider, YONG Aranda, please contact MY Medical Assistant, Latonya, directly at [phone].  Recommend saving Latonya's direct number in phone as this is the PREFERRED & EASIEST way to get in contact with your provider.  Please leave a voice mail if you do not get an answer and she will return your call within 24 hrs. You will NOT be able to contact provider on [facility], as Behavioral Health Providers are restricted. YOU MUST CALL [phone]    If you need to speak with the on call provider after hours or on weekends, please Contact the [facility] ([phone]) and staff will be able to page the provider on call directly.        3, MEDICATION REFILLS:  PLEASE CALL THE PHARMACY TO REQUEST ALL MEDICATION REFILLS TO ENSURE YOU ARE RECEIVING YOUR MEDICATIONS IN A TIMELY MANNER.    IF YOU USE AN AUTOMATED SERVICE AT THE PHARMACY FOR REFILLS AND ARE TOLD THERE ARE \"NO REFILLS REMAINING\"   PLEASE CALL THE PHARMACY & SPEAK TO A LIVE PERSON TO VERIFY IT IS THE MOST UP TO DATE PRESCRIPTION ON FILE.    All new prescriptions will have a different number, therefore, if you were given refills for a medication today or at last visit it will not have the same number as the previous prescription.       4.  In the event you have personal crisis, contact the following crisis numbers: Suicide Prevention Hotline 1-867.746.7375 or *988, [facility] Helpline 6-366-920-ARON; [facility] Emergency Room [phone]; text HELLO to [phone]; or 668.      5. We would appreciate your feedback, please scan the QRS code on the back of your appointment card (or see below) and complete a brief survey.  [place] location is still not available, so " "please click \"[place]\" location.  Thank you      SPECIFIC RECOMMENDATIONS:     1.      Medications discussed at this encounter:                   - no changes     2.      Psychotherapy recommendations: Declined     3.     Return to clinic: 6 months    Please arrive at least 15 minutes before your scheduled appointment time to complete check in process.      IF you are scheduled for a [facility] VIDEO visit, PLEASE ANSWER YOUR PHONE WHEN OFFICE CALLS PRIOR TO VISIT TO COMPLETE THE CHECK IN PROCESS, EVEN IF THE E-CHECK IN WAS COMPLETED.     If you would like to log on to [facility] and complete the \"E-Check IN\" prior to your visit, please do so, this will speed up the check in process.  If you are due for questionnaires, you will find those on [facility] as well, please try to complete prior to your scheduled appointment.          "

## 2022-12-06 ENCOUNTER — OFFICE VISIT (OUTPATIENT)
Dept: FAMILY MEDICINE CLINIC | Facility: CLINIC | Age: 42
End: 2022-12-06

## 2022-12-06 VITALS
BODY MASS INDEX: 21.62 KG/M2 | SYSTOLIC BLOOD PRESSURE: 102 MMHG | HEART RATE: 77 BPM | OXYGEN SATURATION: 99 % | HEIGHT: 69 IN | DIASTOLIC BLOOD PRESSURE: 62 MMHG | TEMPERATURE: 96.2 F | WEIGHT: 146 LBS

## 2022-12-06 DIAGNOSIS — G81.10 SPASTIC HEMIPLEGIA AFFECTING DOMINANT SIDE: ICD-10-CM

## 2022-12-06 DIAGNOSIS — E78.5 DYSLIPIDEMIA: Primary | ICD-10-CM

## 2022-12-06 DIAGNOSIS — R56.9 SEIZURE: ICD-10-CM

## 2022-12-06 DIAGNOSIS — Z23 ENCOUNTER FOR IMMUNIZATION: ICD-10-CM

## 2022-12-06 DIAGNOSIS — R10.9 LEFT FLANK PAIN: ICD-10-CM

## 2022-12-06 DIAGNOSIS — M62.89 MUSCLE HYPERTONICITY: ICD-10-CM

## 2022-12-06 DIAGNOSIS — F33.1 MODERATE EPISODE OF RECURRENT MAJOR DEPRESSIVE DISORDER: ICD-10-CM

## 2022-12-06 DIAGNOSIS — M54.50 ACUTE BILATERAL LOW BACK PAIN WITHOUT SCIATICA: ICD-10-CM

## 2022-12-06 DIAGNOSIS — E55.9 VITAMIN D DEFICIENCY: ICD-10-CM

## 2022-12-06 DIAGNOSIS — R63.4 UNINTENTIONAL WEIGHT LOSS: ICD-10-CM

## 2022-12-06 DIAGNOSIS — Z98.890 HISTORY OF REPAIR OF MITRAL VALVE: ICD-10-CM

## 2022-12-06 DIAGNOSIS — R68.89 COLD INTOLERANCE: ICD-10-CM

## 2022-12-06 DIAGNOSIS — M79.641 PAIN IN RIGHT HAND: ICD-10-CM

## 2022-12-06 PROCEDURE — 90686 IIV4 VACC NO PRSV 0.5 ML IM: CPT | Performed by: NURSE PRACTITIONER

## 2022-12-06 PROCEDURE — 99214 OFFICE O/P EST MOD 30 MIN: CPT | Performed by: NURSE PRACTITIONER

## 2022-12-06 PROCEDURE — G0008 ADMIN INFLUENZA VIRUS VAC: HCPCS | Performed by: NURSE PRACTITIONER

## 2022-12-06 RX ORDER — BACLOFEN 10 MG/1
10 TABLET ORAL 2 TIMES DAILY PRN
Qty: 180 TABLET | Refills: 0 | Status: SHIPPED | OUTPATIENT
Start: 2022-12-06

## 2022-12-06 RX ORDER — DULOXETIN HYDROCHLORIDE 60 MG/1
90 CAPSULE, DELAYED RELEASE ORAL
COMMUNITY
Start: 2022-12-03 | End: 2022-12-06

## 2022-12-06 NOTE — PATIENT INSTRUCTIONS
Continue to work on healthy diet and exercise.  Follow up pending lab results.  Follow up in 6 months for Medicare Wellness, or sooner if problems or concerns.   Follow up as scheduled with cardiology.

## 2022-12-06 NOTE — PROGRESS NOTES
Answers for HPI/ROS submitted by the patient on 11/29/2022  What is the primary reason for your visit?: Physical    Subjective   Ania Reyes is a 42 y.o. female.     Chief Complaint   Patient presents with   • Hyperlipidemia     Follow up   • muscle hypertonicity       History of Present Illness   Patient presents of for follow up S/P MVR: takes Metoprolol daily; does not typically monitor BP; no headaches; some orthostasis at times, not every time; resolves after a couple of seconds; has been drinking more liquids; no swelling; sees Dr. Mccurdy cardiology; has annual follow up next week.     F/U dyslipidemia: takes Atorvastatin daily; no myalgias; watches saturated fats; not much exercise; fasting today.     F/U right hand pain/muscle hypertonicity: takes Baclofen twice daily and Diclofenac three times daily and help; has been taking Baclofen in early morning and again around 1000 to prevent hand drawing up around 0930 and works well; also takes Gabapentin QID; sees pain management; gets Botox injections in right hand and foot per Dr. Trammell at [facility] and helps; uses TENs unit and wears brace to stretch hand as recommended per PT and helps.     F/U seizures/spastic hemiplegia/history of stroke: takes Keppra twice daily; sees neurology Dr. Ortega and goes to Epilepsy clinic; no seizures since has been on medication.     F/U depression/anxiety: takes Duloxetine twice daily; takes 60 mg at 1 p.m. and 30 mg at bedtime and working well; sees psychiatry and helps; no SI/HI.     F/U weight loss: weight has improved; has been eating better; living with mom.    F/U lower back pain: resolved; previously had pain in bilateral lower back; had x-ray and WNL; did PT and helped; no concerns at this point.    Mother was present during the history-taking and subsequent discussion with this patient.  Patient agrees to the presence of the individual during this visit.        The following portions of the patient's history  were reviewed and updated as appropriate: allergies, current medications, past family history, past medical history, past social history, past surgical history and problem list.       Current Outpatient Medications   Medication Sig Dispense Refill   • aspirin 81 MG tablet Take 81 mg by mouth Daily.     • atorvastatin (LIPITOR) 20 MG tablet Take 20 mg by mouth Daily.     • baclofen (LIORESAL) 10 MG tablet Take 1 tablet by mouth 2 (Two) Times a Day As Needed for Muscle Spasms. 180 tablet 0   • diazePAM (DIASTAT ACUDIAL) 20 MG rectal kit INSERT 12.5MG INTO THE RECTUM ONCE FOR 1 DOSE FOR GENERALIZED SEIZURE LASTING OVER 3 MINUTES.     • diclofenac (VOLTAREN) 50 MG EC tablet Take 1 tablet by mouth 3 (Three) Times a Day As Needed (pain). for pain 270 tablet 1   • DULoxetine (CYMBALTA) 30 MG capsule Take 2 capsules by mouth Every Morning AND 1 capsule Every Night. Do all this for 180 days. Indications: Generalized Anxiety Disorder, Major Depressive Disorder 270 capsule 1   • famotidine (PEPCID) 20 MG tablet Take 20 mg by mouth Daily.     • gabapentin (NEURONTIN) 600 MG tablet Take 600 mg by mouth 4 (Four) Times a Day.  5   • levETIRAcetam (KEPPRA) 500 MG tablet Take 500 mg by mouth 2 (Two) Times a Day.     • metoprolol succinate XL (TOPROL-XL) 25 MG 24 hr tablet Take 25 mg by mouth Daily.  3   • Multiple Vitamins-Minerals (MULTIVITAMIN ADULTS PO) Take 1 tablet by mouth Daily.     • potassium chloride (K-DUR) 10 MEQ CR tablet Take 10 mEq by mouth Daily.     • VITAMIN D, CHOLECALCIFEROL, PO Take 1 tablet by mouth Daily.       No current facility-administered medications for this visit.       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 ([facility]) 05/11/2015    Description: 02- - (N) Coronaries - LVEF 55% - Severe prolapse of the anterior & posterior mitral leaflet with severe MR,   • Functional cardiac murmur 04/14/2014   • Gingival enlargement 11/17/2016   • Gum hypertrophy    • Heart failure ([facility]) 08/13/2021    NYHA class 3 NYHA class 3 NYHA class 3 NYHA class 3   • High risk medication use    • History of acute sinusitis    • History of dizziness    • History of echocardiogram    • History of hyperparathyroidism    • History of kidney stones     Bilateral   • History of low back pain    • History of mitral valve repair     [employer] annuloplasty ring, complex MV repair with significant intra-op and post op complications   • History of nephrolithiasis    • History of stroke     LMCA; right hemiplegia   • History of transesophageal echocardiography ([facility])    • Hydroureteronephrosis 12/21/2020 12/21/20 CT abd/pelvis: 4-5 mm obstructing stone located in the distal left ureter with mild-moderate left hydroureteronephrosis; tiny nonobstructing stones elsewhere in both kidneys   • Kidney stone     3mm    • Lactase deficiency    • Left flank pain 12/7/2020   • Left nephrolithiasis 08/12/2019   • Methicillin resistant Staphylococcus aureus infection 03/13/2015    No A2K system hx - now +MRSA sputum on 3/13/2015 No A2K system hx - now +MRSA sputum on 3/13/2015 No A2K system hx - now +MRSA sputum on 3/13/2015 No A2K system hx - now +MRSA sputum on 3/13/2015   • Mitral regurgitation    • Mitral valve prolapse    • Muscle hypertonicity    • Need for SBE (subacute bacterial endocarditis) prophylaxis 08/12/2019   • Osteopenia    • Pain in right hand    • Pain, wrist joint    • Parathyroid adenoma    • Positive depression screening    • Primary hyperparathyroidism (HCC)    • Right arm pain     • Right hemiplegia (HCC) 2015   • Seizures (HCC)    • Sinus tachycardia    • Spastic hemiplegia affecting dominant side (HCC)    • Stroke (HCC) May 2015   • Vitamin D deficiency        Past Surgical History:   Procedure Laterality Date   • CARDIAC SURGERY     • CYSTOSCOPY URETEROSCOPY LASER LITHOTRIPSY     • KNEE SURGERY Left    • MITRAL VALVE REPAIR/REPLACEMENT  2015    annuloplasty ring, complex MV repair with significant intra-op and post-op complications   • OTHER SURGICAL HISTORY      Selective Arterial Catheterization    • PARATHYROID GLAND SURGERY      Parathyroid resection       Family History   Problem Relation Age of Onset   • Depression Mother    • Anxiety disorder Mother    • Coronary artery disease Mother    • Glaucoma Mother    • Nephrolithiasis Mother    • Heart disease Mother    • No Known Problems Father    • Anxiety disorder Sister    • Breast cancer Maternal Aunt         diagnosed in 40s   • Ovarian cancer Maternal Aunt    • Osteoporosis Maternal Aunt    • Coronary artery disease Maternal Grandfather    • Heart attack Maternal Grandfather          at age 54 years   • Colon cancer Maternal Grandmother         diagnosed in her 60's   • Breast cancer Maternal Grandmother    • Ovarian cancer Maternal Grandmother    • Colon cancer Paternal Grandfather    • No Known Problems Other        Social History     Socioeconomic History   • Marital status:    Tobacco Use   • Smoking status: Never   • Smokeless tobacco: Never   Vaping Use   • Vaping Use: Never used   Substance and Sexual Activity   • Alcohol use: Yes     Alcohol/week: 1.0 standard drink     Types: 1 Drinks containing 0.5 oz of alcohol per week     Comment: social drinker   • Drug use: Never   • Sexual activity: Yes     Partners: Male     Birth control/protection: None       Review of Systems   Constitutional: Negative for appetite change, chills, fatigue, fever, unexpected weight gain and unexpected weight loss.  "  HENT: Negative for ear pain, sinus pressure, sore throat and trouble swallowing.    Eyes: Negative for blurred vision.   Respiratory: Negative for cough, chest tightness and shortness of breath.    Cardiovascular: Negative for chest pain and palpitations.   Gastrointestinal: Negative for abdominal pain, blood in stool, constipation, diarrhea, GERD and indigestion.   Endocrine: Positive for cold intolerance. Negative for heat intolerance and polydipsia.   Genitourinary: Negative for dysuria and frequency.   Musculoskeletal: Negative for back pain.   Skin: Negative for rash.   Neurological: Negative for syncope and weakness.   Hematological: Does not bruise/bleed easily.   Psychiatric/Behavioral: Negative for suicidal ideas.       Objective   Vitals:    12/06/22 1312   BP: 102/62   BP Location: Left arm   Patient Position: Sitting   Cuff Size: Adult   Pulse: 77   Temp: 96.2 °F (35.7 °C)   TempSrc: Temporal   SpO2: 99%   Weight: 66.2 kg (146 lb)   Height: 175.3 cm (69\")     Body mass index is 21.56 kg/m².    Physical Exam  Vitals and nursing note reviewed.   Constitutional:       General: She is not in acute distress.     Appearance: She is well-developed and well-groomed. She is not diaphoretic.   HENT:      Head: Normocephalic.      Right Ear: External ear normal.      Left Ear: External ear normal.   Eyes:      Conjunctiva/sclera: Conjunctivae normal.   Neck:      Vascular: No carotid bruit.   Cardiovascular:      Rate and Rhythm: Normal rate and regular rhythm.      Pulses: Normal pulses.      Heart sounds: Normal heart sounds. No murmur heard.  Pulmonary:      Effort: Pulmonary effort is normal. No respiratory distress.      Breath sounds: Normal breath sounds.   Abdominal:      General: Bowel sounds are normal.      Palpations: Abdomen is soft. There is no hepatomegaly or splenomegaly.      Tenderness: There is no abdominal tenderness. There is no guarding.   Musculoskeletal:      Cervical back: Normal range of " motion and neck supple. No bony tenderness.      Thoracic back: No bony tenderness.      Lumbar back: No bony tenderness.      Right lower leg: No edema.      Left lower leg: No edema.   Skin:     General: Skin is warm and dry.      Findings: No rash.   Neurological:      Mental Status: She is alert and oriented to person, place, and time.      Motor: Weakness: some in right hand compared to left.   Psychiatric:         Mood and Affect: Mood normal.         Behavior: Behavior normal.         Thought Content: Thought content normal.         Cognition and Memory: Cognition normal.         Judgment: Judgment normal.         Lab Results   Component Value Date    WBC 6.8 07/25/2022    RBC 4.30 07/25/2022    HGB 12.8 07/25/2022    HCT 40.0 07/25/2022    MCV 93 07/25/2022    MCH 29.8 07/25/2022    MCHC 32.0 07/25/2022    RDW 11.9 07/25/2022     07/25/2022    NEUTRORELPCT 71 07/25/2022    LYMPHORELPCT 22 07/25/2022    MONORELPCT 5 07/25/2022    EOSRELPCT 1 07/25/2022    BASORELPCT 1 07/25/2022    NEUTROABS 4.9 07/25/2022    LYMPHSABS 1.5 07/25/2022    MONOSABS 0.3 07/25/2022    EOSABS 0.1 07/25/2022    BASOSABS 0.1 07/25/2022    NRBC 0.0 12/11/2020     Lab Results   Component Value Date    GLUCOSE 70 07/25/2022    BUN 15 07/25/2022    CREATININE 0.81 07/25/2022    EGFRIFNONA 106 11/05/2021    EGFRIFAFRI 122 11/05/2021    BCR 19 07/25/2022    K 4.2 07/25/2022    CO2 25 07/25/2022    CALCIUM 9.4 07/25/2022    PROTENTOTREF 6.5 07/25/2022    ALBUMIN 4.4 07/25/2022    LABIL2 2.1 07/25/2022    AST 28 07/25/2022    ALT 30 07/25/2022      Lab Results   Component Value Date    CHLPL 168 07/25/2022    TRIG 194 (H) 07/25/2022    HDL 45 07/25/2022    VLDL 33 07/25/2022    LDL 90 07/25/2022     Lab Results   Component Value Date    TSH 2.370 09/07/2021         Assessment    Problem List Items Addressed This Visit     Spastic hemiplegia affecting dominant side (HCC)    Current Assessment & Plan     Continue recommendations per  physical therapy.  Continue Baclofen twice daily.  Continue Diclofenac three times daily as needed.         Relevant Medications    diclofenac (VOLTAREN) 50 MG EC tablet    baclofen (LIORESAL) 10 MG tablet    Pain in right hand    Current Assessment & Plan     Stable.  Continue Diclofenac 3 times daily.  Continue Baclofen twice daily.         Relevant Medications    diclofenac (VOLTAREN) 50 MG EC tablet    baclofen (LIORESAL) 10 MG tablet    Muscle hypertonicity    Current Assessment & Plan     Continue Baclofen twice daily.  Continue Diclofenac three times daily as needed.  Follow up as scheduled with pain management.         Relevant Medications    diclofenac (VOLTAREN) 50 MG EC tablet    baclofen (LIORESAL) 10 MG tablet    History of repair of mitral valve    Overview     Description: [employer] annuloplasty ring, complex MV repair with significant intra-op and post-op complications         Current Assessment & Plan     Continue Metoprolol daily.  Follow up as scheduled with cardiology.         Dyslipidemia - Primary    Current Assessment & Plan     Continue Atorvastatin daily.  Continue to work on healthy diet and exercise as tolerated.         Relevant Orders    Lipid Panel With LDL / HDL Ratio    Comprehensive Metabolic Panel    Vitamin D deficiency    Relevant Orders    Vitamin D,25-Hydroxy    Unintentional weight loss    Current Assessment & Plan     Weight improved today.  Continue healthy diet.         Seizure (HCC)    Overview     First seizure 4/1/21 and second June 2021         Current Assessment & Plan     Stable.  Continue Keppra twice daily.  Follow up as scheduled with neurology.         Moderate episode of recurrent major depressive disorder (HCC)    Current Assessment & Plan     Patient's depression is recurrent and is moderate without psychosis. Their depression is currently active and the condition is stable. This will be reassessed in 3 months. F/U as described:patient will continue  current medication therapy.  Continue Duloxetine twice daily.         Cold intolerance    Relevant Orders    TSH Rfx On Abnormal To Free T4    RESOLVED: Left flank pain    Current Assessment & Plan     Resolved with physical therapy.         RESOLVED: Acute bilateral low back pain without sciatica   Other Visit Diagnoses     Encounter for immunization          --flu vaccine today            Return in about 6 months (around 6/6/2023) for Medicare Wellness, Recheck.or sooner if problems or concerns.         COVID-19 Precautions - Patient was compliant in wearing a mask. When I saw the patient, I used appropriate personal protective equipment (PPE) including mask, gloves, and eye shield (standard procedure).  Hand hygiene was completed before and after seeing the patient.

## 2022-12-07 PROBLEM — N13.30 HYDROURETERONEPHROSIS: Status: RESOLVED | Noted: 2020-12-21 | Resolved: 2022-12-07

## 2022-12-07 PROBLEM — R68.89 COLD INTOLERANCE: Status: ACTIVE | Noted: 2022-12-07

## 2022-12-07 PROBLEM — R10.9 LEFT FLANK PAIN: Status: RESOLVED | Noted: 2020-12-07 | Resolved: 2022-12-07

## 2022-12-07 PROBLEM — M54.50 ACUTE BILATERAL LOW BACK PAIN WITHOUT SCIATICA: Status: RESOLVED | Noted: 2022-07-26 | Resolved: 2022-12-07

## 2022-12-07 LAB
25(OH)D3+25(OH)D2 SERPL-MCNC: 53.8 NG/ML (ref 30–100)
ALBUMIN SERPL-MCNC: 4.6 G/DL (ref 3.8–4.8)
ALBUMIN/GLOB SERPL: 2.3 {RATIO} (ref 1.2–2.2)
ALP SERPL-CCNC: 102 IU/L (ref 44–121)
ALT SERPL-CCNC: 24 IU/L (ref 0–32)
AST SERPL-CCNC: 22 IU/L (ref 0–40)
BILIRUB SERPL-MCNC: 0.4 MG/DL (ref 0–1.2)
BUN SERPL-MCNC: 17 MG/DL (ref 6–24)
BUN/CREAT SERPL: 21 (ref 9–23)
CALCIUM SERPL-MCNC: 9.7 MG/DL (ref 8.7–10.2)
CHLORIDE SERPL-SCNC: 104 MMOL/L (ref 96–106)
CHOLEST SERPL-MCNC: 188 MG/DL (ref 100–199)
CO2 SERPL-SCNC: 23 MMOL/L (ref 20–29)
CREAT SERPL-MCNC: 0.81 MG/DL (ref 0.57–1)
EGFRCR SERPLBLD CKD-EPI 2021: 94 ML/MIN/1.73
GLOBULIN SER CALC-MCNC: 2 G/DL (ref 1.5–4.5)
GLUCOSE SERPL-MCNC: 74 MG/DL (ref 70–99)
HDLC SERPL-MCNC: 49 MG/DL
LDLC SERPL CALC-MCNC: 119 MG/DL (ref 0–99)
LDLC/HDLC SERPL: 2.4 RATIO (ref 0–3.2)
POTASSIUM SERPL-SCNC: 4.3 MMOL/L (ref 3.5–5.2)
PROT SERPL-MCNC: 6.6 G/DL (ref 6–8.5)
SODIUM SERPL-SCNC: 142 MMOL/L (ref 134–144)
TRIGL SERPL-MCNC: 112 MG/DL (ref 0–149)
TSH SERPL DL<=0.005 MIU/L-ACNC: 3.41 UIU/ML (ref 0.45–4.5)
VLDLC SERPL CALC-MCNC: 20 MG/DL (ref 5–40)

## 2022-12-07 NOTE — ASSESSMENT & PLAN NOTE
Continue recommendations per physical therapy.  Continue Baclofen twice daily.  Continue Diclofenac three times daily as needed.

## 2022-12-07 NOTE — ASSESSMENT & PLAN NOTE
Patient's depression is recurrent and is moderate without psychosis. Their depression is currently active and the condition is stable. This will be reassessed in 3 months. F/U as described:patient will continue current medication therapy.  Continue Duloxetine twice daily.

## 2022-12-07 NOTE — ASSESSMENT & PLAN NOTE
Continue Baclofen twice daily.  Continue Diclofenac three times daily as needed.  Follow up as scheduled with pain management.

## 2023-01-25 ENCOUNTER — TELEPHONE (OUTPATIENT)
Dept: FAMILY MEDICINE CLINIC | Facility: CLINIC | Age: 43
End: 2023-01-25
Payer: MEDICARE

## 2023-01-25 DIAGNOSIS — R53.83 FATIGUE, UNSPECIFIED TYPE: Primary | ICD-10-CM

## 2023-01-25 DIAGNOSIS — I50.9 HEART FAILURE, UNSPECIFIED HF CHRONICITY, UNSPECIFIED HEART FAILURE TYPE: ICD-10-CM

## 2023-01-25 NOTE — TELEPHONE ENCOUNTER
The patient was speaking with YONG Yan via [employer] and Cinthia said she would order labs to check the patient's iron levels. The patient is scheduled for 1/27/23 at 1:00 pm for her labs. She just needs the orders put in. Please advise.

## 2023-01-27 DIAGNOSIS — R53.83 FATIGUE, UNSPECIFIED TYPE: ICD-10-CM

## 2023-01-27 DIAGNOSIS — I50.9 HEART FAILURE, UNSPECIFIED HF CHRONICITY, UNSPECIFIED HEART FAILURE TYPE: ICD-10-CM

## 2023-01-28 LAB
ALBUMIN SERPL-MCNC: 4.2 G/DL (ref 3.8–4.8)
ALBUMIN/GLOB SERPL: 1.9 {RATIO} (ref 1.2–2.2)
ALP SERPL-CCNC: 84 IU/L (ref 44–121)
ALT SERPL-CCNC: 29 IU/L (ref 0–32)
AST SERPL-CCNC: 19 IU/L (ref 0–40)
BASOPHILS # BLD AUTO: 0.1 X10E3/UL (ref 0–0.2)
BASOPHILS NFR BLD AUTO: 1 %
BILIRUB SERPL-MCNC: 0.2 MG/DL (ref 0–1.2)
BUN SERPL-MCNC: 15 MG/DL (ref 6–24)
BUN/CREAT SERPL: 20 (ref 9–23)
CALCIUM SERPL-MCNC: 9.6 MG/DL (ref 8.7–10.2)
CHLORIDE SERPL-SCNC: 102 MMOL/L (ref 96–106)
CO2 SERPL-SCNC: 24 MMOL/L (ref 20–29)
CREAT SERPL-MCNC: 0.74 MG/DL (ref 0.57–1)
EGFRCR SERPLBLD CKD-EPI 2021: 105 ML/MIN/1.73
EOSINOPHIL # BLD AUTO: 0.2 X10E3/UL (ref 0–0.4)
EOSINOPHIL NFR BLD AUTO: 2 %
ERYTHROCYTE [DISTWIDTH] IN BLOOD BY AUTOMATED COUNT: 11.8 % (ref 11.7–15.4)
FERRITIN SERPL-MCNC: 44 NG/ML (ref 15–150)
FOLATE SERPL-MCNC: >20 NG/ML
GLOBULIN SER CALC-MCNC: 2.2 G/DL (ref 1.5–4.5)
GLUCOSE SERPL-MCNC: 88 MG/DL (ref 70–99)
HCT VFR BLD AUTO: 38.8 % (ref 34–46.6)
HGB BLD-MCNC: 12.8 G/DL (ref 11.1–15.9)
IMM GRANULOCYTES # BLD AUTO: 0 X10E3/UL (ref 0–0.1)
IMM GRANULOCYTES NFR BLD AUTO: 0 %
IRON SERPL-MCNC: 78 UG/DL (ref 27–159)
LYMPHOCYTES # BLD AUTO: 1.5 X10E3/UL (ref 0.7–3.1)
LYMPHOCYTES NFR BLD AUTO: 23 %
MCH RBC QN AUTO: 29.9 PG (ref 26.6–33)
MCHC RBC AUTO-ENTMCNC: 33 G/DL (ref 31.5–35.7)
MCV RBC AUTO: 91 FL (ref 79–97)
MONOCYTES # BLD AUTO: 0.4 X10E3/UL (ref 0.1–0.9)
MONOCYTES NFR BLD AUTO: 6 %
NEUTROPHILS # BLD AUTO: 4.3 X10E3/UL (ref 1.4–7)
NEUTROPHILS NFR BLD AUTO: 68 %
PLATELET # BLD AUTO: 205 X10E3/UL (ref 150–450)
POTASSIUM SERPL-SCNC: 4.1 MMOL/L (ref 3.5–5.2)
PROT SERPL-MCNC: 6.4 G/DL (ref 6–8.5)
RBC # BLD AUTO: 4.28 X10E6/UL (ref 3.77–5.28)
SODIUM SERPL-SCNC: 140 MMOL/L (ref 134–144)
VIT B12 SERPL-MCNC: 311 PG/ML (ref 232–1245)
WBC # BLD AUTO: 6.4 X10E3/UL (ref 3.4–10.8)

## 2023-01-30 DIAGNOSIS — E53.8 VITAMIN B12 DEFICIENCY: Primary | ICD-10-CM

## 2023-01-30 RX ORDER — LANOLIN ALCOHOL/MO/W.PET/CERES
1000 CREAM (GRAM) TOPICAL DAILY
Start: 2023-01-30

## 2023-04-12 ENCOUNTER — TELEPHONE (OUTPATIENT)
Dept: FAMILY MEDICINE CLINIC | Facility: CLINIC | Age: 43
End: 2023-04-12
Payer: MEDICARE

## 2023-04-12 PROBLEM — E53.8 VITAMIN B12 DEFICIENCY: Status: ACTIVE | Noted: 2023-04-12

## 2023-04-12 NOTE — TELEPHONE ENCOUNTER
Patient calling in to see if her paperwork was ready, she is coming in from out of town tomorrow and did not want to waste a trip.  Thanks.

## 2023-05-30 DIAGNOSIS — M62.89 MUSCLE HYPERTONICITY: ICD-10-CM

## 2023-05-30 DIAGNOSIS — M79.641 PAIN IN RIGHT HAND: ICD-10-CM

## 2023-05-30 DIAGNOSIS — G81.10 SPASTIC HEMIPLEGIA AFFECTING DOMINANT SIDE: ICD-10-CM

## 2023-05-30 RX ORDER — BACLOFEN 10 MG/1
10 TABLET ORAL 2 TIMES DAILY PRN
Qty: 180 TABLET | Refills: 0 | Status: SHIPPED | OUTPATIENT
Start: 2023-05-30

## 2023-05-30 RX ORDER — BACLOFEN 10 MG/1
10 TABLET ORAL 2 TIMES DAILY PRN
Qty: 180 TABLET | Refills: 0 | OUTPATIENT
Start: 2023-05-30

## 2023-05-30 NOTE — TELEPHONE ENCOUNTER
LOV             12/6/2022   NOV             6/6/2023   Last RF        12/6/22    Tawnya Hawthorne, JEFFRY/LMR

## 2023-06-02 ENCOUNTER — TELEMEDICINE (OUTPATIENT)
Dept: BEHAVIORAL HEALTH | Facility: CLINIC | Age: 43
End: 2023-06-02

## 2023-06-02 DIAGNOSIS — F41.1 GENERALIZED ANXIETY DISORDER: ICD-10-CM

## 2023-06-02 DIAGNOSIS — F33.42 RECURRENT MAJOR DEPRESSIVE DISORDER, IN FULL REMISSION: Primary | ICD-10-CM

## 2023-06-02 RX ORDER — AMOXICILLIN 250 MG
CAPSULE ORAL
COMMUNITY
Start: 2023-02-01 | End: 2023-06-02 | Stop reason: SDUPTHER

## 2023-06-02 RX ORDER — DULOXETIN HYDROCHLORIDE 30 MG/1
CAPSULE, DELAYED RELEASE ORAL
Qty: 270 CAPSULE | Refills: 3 | Status: SHIPPED | OUTPATIENT
Start: 2023-06-02 | End: 2024-06-01

## 2023-06-02 NOTE — PATIENT INSTRUCTIONS
"1. Should you want to get in touch with your provider, YONG Aranda, please contact MY Medical Assistant, Latonya, directly at [phone].  Recommend saving Latonya's direct number in phone as this is the PREFERRED & EASIEST way to get in contact with your provider.  Please leave a voice mail if you do not get an answer and she will return your call within 24 hrs. You will NOT be able to contact provider on [employer], as Behavioral Health Providers are restricted. YOU MUST CALL [phone]    If you need to speak with the on call provider after hours or on weekends, please Contact the [facility] ([phone]) and staff will be able to page the provider on call directly.      2.  In the event you need to cancel an appointment, please notify the office at least 24 hrs prior:   Contact **Latonya Medical Assistant at [facility] directly at [phone] or the [facility] ([phone])       3, MEDICATION REFILLS:  PLEASE CALL THE PHARMACY TO REQUEST ALL MEDICATION REFILLS or via [employer] TO ENSURE YOU ARE RECEIVING YOUR MEDICATIONS IN A TIMELY MANNER. The pharmacy or [employer] will send this request ELECTRONICALLY to the ordering provider.     IF YOU USE AN AUTOMATED SERVICE AT THE PHARMACY FOR REFILLS AND ARE TOLD THERE ARE \"NO REFILLS REMAINING\"   PLEASE CALL THE PHARMACY & SPEAK TO A LIVE PERSON TO VERIFY IT IS THE MOST UP TO DATE PRESCRIPTION ON FILE.    All new prescriptions will have a different number, therefore, if you were given refills for a medication today or at last visit it will not have the same number as the previous prescription.       4.  In the event you have personal crisis, contact the following crisis numbers: Suicide Prevention Hotline 1-281.925.9243 or *988, AR[facility] Helpline 9-928-892-IPSZ; [facility] Emergency Room [phone]; text HELLO to 494625; or 911.  If you feel like harming yourself or others, call 911 right away.  You can call the 012 Suicide and " "Crisis Lifeline at  988   to [facility] a counselor [facility], or you can connect with one using their online chat  .    5.  Never stop an antidepressant medicine without first talking to a healthcare provider. Suddenly stopping this type of medication can cause withdrawal symptoms.    6.  Counseling and talk therapy  Counseling or therapy teaches you new coping skills and more adaptive ways of thinking about problems. These tools can help you make positive changes. The benefits of counseling often last long after treatment sessions have stopped.    7.   We would appreciate your feedback, please scan the QRS code on the back of your appointment card (or see below) and complete a brief survey.  [place] location is still not available, so please click \"[place]\" location.  Thank you      SPECIFIC RECOMMENDATIONS:     1.      Medications discussed at this encounter:                   - Refilled Cymbalta     2.      Psychotherapy recommendations: Declined     3.     Return to clinic:1 yr Monday 6/3/24 at 2 pm video    Please arrive at least 15 minutes before your scheduled appointment time to complete check in process.      IF you are scheduled for a [place] VIDEO visit, YOU MUST COMPLETE THE \"E-CHECK IN\" PROCESS PRIOR TO BEGINNING THE VISIT, YOU WILL NO LONGER RECEIVE A PHONE CALL PRIOR TO ALL VIDEO VISITS; You may still complete the E-Check in for in office visits prior to appointment, you will receive multiple text/email reminders which will direct you further if needed.           "

## 2023-06-06 ENCOUNTER — OFFICE VISIT (OUTPATIENT)
Dept: FAMILY MEDICINE CLINIC | Facility: CLINIC | Age: 43
End: 2023-06-06
Payer: MEDICARE

## 2023-06-06 VITALS
DIASTOLIC BLOOD PRESSURE: 60 MMHG | OXYGEN SATURATION: 98 % | HEART RATE: 78 BPM | HEIGHT: 69 IN | SYSTOLIC BLOOD PRESSURE: 114 MMHG | BODY MASS INDEX: 20.73 KG/M2 | WEIGHT: 140 LBS

## 2023-06-06 DIAGNOSIS — Z00.00 ENCOUNTER FOR MEDICARE ANNUAL WELLNESS EXAM: Primary | ICD-10-CM

## 2023-06-06 DIAGNOSIS — Z98.890 HISTORY OF REPAIR OF MITRAL VALVE: ICD-10-CM

## 2023-06-06 DIAGNOSIS — M85.80 OSTEOPENIA, UNSPECIFIED LOCATION: ICD-10-CM

## 2023-06-06 DIAGNOSIS — E53.8 VITAMIN B12 DEFICIENCY: ICD-10-CM

## 2023-06-06 DIAGNOSIS — F33.1 MODERATE EPISODE OF RECURRENT MAJOR DEPRESSIVE DISORDER: ICD-10-CM

## 2023-06-06 DIAGNOSIS — R56.9 SEIZURE: ICD-10-CM

## 2023-06-06 DIAGNOSIS — M62.89 MUSCLE HYPERTONICITY: ICD-10-CM

## 2023-06-06 DIAGNOSIS — G81.10 SPASTIC HEMIPLEGIA AFFECTING DOMINANT SIDE: ICD-10-CM

## 2023-06-06 DIAGNOSIS — M79.641 PAIN IN RIGHT HAND: ICD-10-CM

## 2023-06-06 DIAGNOSIS — E78.5 DYSLIPIDEMIA: ICD-10-CM

## 2023-06-06 DIAGNOSIS — M85.88 OTHER SPECIFIED DISORDERS OF BONE DENSITY AND STRUCTURE, OTHER SITE: ICD-10-CM

## 2023-06-06 NOTE — PATIENT INSTRUCTIONS
Continue to work on healthy diet and exercise.  Follow up pending lab results.  Follow up in 6 months, or sooner if problems or concerns.       Medicare Wellness  Personal Prevention Plan of Service     Date of Office Visit:    Encounter Provider:  YONG Jefferson  Place of Service:  [facility]  Patient Name: Ania Reyes  :  1980    As part of the Medicare Wellness portion of your visit today, we are providing you with this personalized preventive plan of services (PPPS). This plan is based upon recommendations of the [place] Preventive Services Task Force (USPSTF) and the Advisory Committee on Immunization Practices (ACIP).    This lists the preventive care services that should be considered, and provides dates of when you are due. Items listed as completed are up-to-date and do not require any further intervention.    Health Maintenance   Topic Date Due    DXA SCAN  2022    ANNUAL WELLNESS VISIT  2023    COVID-19 Vaccine (4 - Moderna series) 2024 (Originally 2022)    PAP SMEAR  2023    INFLUENZA VACCINE  2023    LIPID PANEL  2024    TDAP/TD VACCINES (3 - Td or Tdap) 2030    HEPATITIS C SCREENING  Completed    Pneumococcal Vaccine 0-64  Aged Out       Orders Placed This Encounter   Procedures    DEXA Bone Density Axial     Standing Status:   Future     Standing Expiration Date:   2024     Scheduling Instructions:      Pt requests Alejandra Sweet     Order Specific Question:   Reason for Exam:     Answer:   follow osteopenia     Order Specific Question:   Patient Pregnant     Answer:   No    Vitamin B12 & Folate     Order Specific Question:   Release to patient     Answer:   Routine Release     Order Specific Question:   [facility] Has the patient fasted?     Answer:   No    Lipid Panel With LDL / HDL Ratio     Order Specific Question:   Release to patient     Answer:   Routine Release     Order Specific Question:    [facility] Has the patient fasted?     Answer:   No    Comprehensive Metabolic Panel     Order Specific Question:   Release to patient     Answer:   Routine Release     Order Specific Question:   [facility] Has the patient fasted?     Answer:   No       Return in about 6 months (around 12/6/2023) for Recheck.

## 2023-06-06 NOTE — PROGRESS NOTES
The ABCs of the Annual Wellness Visit  Subsequent Medicare Wellness Visit    Subjective    Ania Reyes is a 42 y.o. female who presents for a Subsequent Medicare Wellness Visit.    The following portions of the patient's history were reviewed and   updated as appropriate: allergies, current medications, past family history, past medical history, past social history, past surgical history, and problem list.    Compared to one year ago, the patient feels her physical   health is better.    Compared to one year ago, the patient feels her mental   health is better.    Recent Hospitalizations:  She was not admitted to the hospital during the last year.       Current Medical Providers:  Patient Care Team:  Cinthia Mark APRN as PCP - General (Family Medicine)  Betty Mccurdy MD as Consulting Physician (Cardiology)  Amanda Escobar APRN (Nurse Practitioner)  Todd Davis MD as Consulting Physician (Urology)  Kellie Peterson APRN as Nurse Practitioner (Nurse Practitioner)  Renetta Ortega MD as Consulting Physician (Neurology)  Markie Trammell MD as Consulting Physician (Physical Medicine and Rehabilitation)  Kalyn Martin APRN as Nurse Practitioner (Behavioral Health)    Outpatient Medications Prior to Visit   Medication Sig Dispense Refill    aspirin 81 MG tablet Take 1 tablet by mouth Daily.      atorvastatin (LIPITOR) 20 MG tablet Take 1 tablet by mouth Daily.      baclofen (LIORESAL) 10 MG tablet Take 1 tablet by mouth 2 (Two) Times a Day As Needed for Muscle Spasms. 180 tablet 0    diazePAM (DIASTAT ACUDIAL) 20 MG rectal kit INSERT 12.5MG INTO THE RECTUM ONCE FOR 1 DOSE FOR GENERALIZED SEIZURE LASTING OVER 3 MINUTES.      diclofenac (VOLTAREN) 50 MG EC tablet Take 1 tablet by mouth 3 (Three) Times a Day As Needed (pain). for pain 270 tablet 1    DULoxetine (CYMBALTA) 30 MG capsule Take 2 capsules by mouth Every Morning AND 1 capsule Every Night. Indications: Generalized Anxiety Disorder, Major  Depressive Disorder 270 capsule 3    famotidine (PEPCID) 20 MG tablet Take 1 tablet by mouth Daily.      gabapentin (NEURONTIN) 600 MG tablet Take 1 tablet by mouth 4 (Four) Times a Day.  5    levETIRAcetam (KEPPRA) 500 MG tablet Take 1 tablet by mouth 2 (Two) Times a Day.      metoprolol succinate XL (TOPROL-XL) 25 MG 24 hr tablet Take 1 tablet by mouth Daily.  3    Multiple Vitamins-Minerals (MULTIVITAMIN ADULTS PO) Take 1 tablet by mouth Daily.      potassium chloride (K-DUR) 10 MEQ CR tablet Take 1 tablet by mouth Daily.      vitamin B-12 (CYANOCOBALAMIN) 1000 MCG tablet Take 1 tablet by mouth Daily.      VITAMIN D, CHOLECALCIFEROL, PO Take 1 tablet by mouth Daily.       No facility-administered medications prior to visit.       No opioid medication identified on active medication list. I have reviewed chart for other potential  high risk medication/s and harmful drug interactions in the elderly.        Aspirin is on active medication list. Aspirin use is indicated based on review of current medical condition/s. Pros and cons of this therapy have been discussed today. Benefits of this medication outweigh potential harm.  Patient has been encouraged to continue taking this medication.  .      Patient Active Problem List   Diagnosis    Spastic hemiplegia affecting dominant side    Pain in right hand    Muscle hypertonicity    History of stroke    History of repair of mitral valve    High risk medication use    Dyslipidemia    Allergic rhinitis    Right hemiplegia (HCC)    Osteopenia    Vitamin D deficiency    History of parathyroid surgery    Abnormality of gait following cerebrovascular accident (CVA)    Unintentional weight loss    Dizziness    Heart failure (HCC)    Hypercalcemia    Methicillin resistant Staphylococcus aureus infection    Seizure (HCC)    Anxiety    Moderate episode of recurrent major depressive disorder    Pain disorder associated with psychological factors    Cold intolerance    Vitamin B12  "deficiency     Advance Care Planning   Advance Care Planning     Advance Directive is not on file.  ACP discussion was held with the patient during this visit. Patient does not have an advance directive, information provided.     Objective    Vitals:    23 1417   BP: 114/60   BP Location: Left arm   Patient Position: Sitting   Cuff Size: Adult   Pulse: 78   SpO2: 98%   Weight: 63.5 kg (140 lb)   Height: 175.3 cm (69\")     Estimated body mass index is 20.67 kg/m² as calculated from the following:    Height as of this encounter: 175.3 cm (69\").    Weight as of this encounter: 63.5 kg (140 lb).    BMI is within normal parameters. No other follow-up for BMI required.      Does the patient have evidence of cognitive impairment? No    Lab Results   Component Value Date    CHLPL 187 2023    TRIG 190 (H) 2023    HDL 39 (L) 2023     (H) 2023    VLDL 33 2023        HEALTH RISK ASSESSMENT    Smoking Status:  Social History     Tobacco Use   Smoking Status Never   Smokeless Tobacco Never     Alcohol Consumption:  Social History     Substance and Sexual Activity   Alcohol Use Yes    Alcohol/week: 1.0 standard drink    Types: 1 Drinks containing 0.5 oz of alcohol per week    Comment: social drinker     Fall Risk Screen:    LAURYN Fall Risk Assessment was completed, and patient is at LOW risk for falls.Assessment completed on:2023    Depression Screenin/6/2023     2:18 PM   PHQ-2/PHQ-9 Depression Screening   Little Interest or Pleasure in Doing Things 0-->not at all   Feeling Down, Depressed or Hopeless 0-->not at all   PHQ-9: Brief Depression Severity Measure Score 0       Health Habits and Functional and Cognitive Screenin/6/2023     2:00 PM   Functional & Cognitive Status   Do you have difficulty preparing food and eating? No   Do you have difficulty bathing yourself, getting dressed or grooming yourself? No   Do you have difficulty using the toilet? No   Do you " have difficulty moving around from place to place? No   Do you have trouble with steps or getting out of a bed or a chair? No   Current Diet Well Balanced Diet   Dental Exam Up to date   Eye Exam Up to date   Exercise (times per week) 3 times per week   Current Exercises Include Walking   Do you need help using the phone?  No   Are you deaf or do you have serious difficulty hearing?  No   Do you need help with transportation? No   Do you need help shopping? No   Do you need help preparing meals?  No   Do you need help with housework?  No   Do you need help with laundry? No   Do you need help taking your medications? No   Do you need help managing money? Yes   Do you ever drive or ride in a car without wearing a seat belt? No   Have you felt unusual stress, anger or loneliness in the last month? No   Who do you live with? Other       Age-appropriate Screening Schedule:  Refer to the list below for future screening recommendations based on patient's age, sex and/or medical conditions. Orders for these recommended tests are listed in the plan section. The patient has been provided with a written plan.    Health Maintenance   Topic Date Due    DXA SCAN  02/21/2022    ANNUAL WELLNESS VISIT  03/08/2023    COVID-19 Vaccine (4 - Moderna series) 06/06/2024 (Originally 2/23/2022)    PAP SMEAR  07/28/2023    INFLUENZA VACCINE  08/01/2023    LIPID PANEL  06/06/2024    TDAP/TD VACCINES (3 - Td or Tdap) 12/07/2030    HEPATITIS C SCREENING  Completed    Pneumococcal Vaccine 0-64  Aged Out     Father has POA over money; lives with mother.             CMS Preventative Services Quick Reference  Risk Factors Identified During Encounter  Immunizations Discussed/Encouraged: COVID19  The above risks/problems have been discussed with the patient.    Discussed low risk for falls and recommended avoiding throw rugs, installing grab bars in bathroom, making sure have handrails on steps, etc.    Pertinent information has been shared with the  patient in the After Visit Summary.  An After Visit Summary and PPPS were made available to the patient.    Follow Up:   Next Medicare Wellness visit to be scheduled in 1 year.       Additional E&M Note during same encounter follows:  Patient has multiple medical problems which are significant and separately identifiable that require additional work above and beyond the Medicare Wellness Visit.      Chief Complaint  Annual Exam    Subjective      HPI  Ania Reyes is also being seen today for follow up S/P MVR: takes Metoprolol daily; does not monitor BP; no headaches; occasional dizziness if gets up too fast; no swelling; sees Dr. Mccurdy cardiology annually.     F/U dyslipidemia: takes Atorvastatin daily; no myalgias; avoids fried foods and high fat dairy; regular exercise, has been active and cutting grass with push mower; non-fasting today.     F/U right hand pain/muscle hypertonicity: takes Baclofen twice daily and Diclofenac three times daily and help; has been taking Baclofen in early morning and again around 1000 to prevent hand drawing up around 0930 and works well; also takes Gabapentin QID; sees pain management; gets Botox injections in right hand and foot per Dr. Trammell at [facility] and helps; symptoms have really improved; wears brace to stretch hand as recommended per PT and helps; also works right hand using tool from PT.     F/U seizures/spastic hemiplegia/history of stroke: takes Keppra twice daily; sees neurology Dr. Ortega and goes to Epilepsy clinic; no seizures since has been on medication.     F/U depression/anxiety: takes Duloxetine twice daily; takes 60 mg at 1 p.m. and 30 mg at bedtime and working well; sees psychiatry and had recent follow up; no changes in medication.     F/U weight loss: weight has been stable; has been eating better; living with mom.     Mother was present during the history-taking and subsequent discussion with this patient.  Patient agrees to the presence of the  "individual during this visit.    Review of Systems   Constitutional:  Negative for appetite change, chills, fatigue and fever.   HENT:  Negative for ear pain, sinus pressure, sore throat and trouble swallowing.    Eyes:  Negative for discharge and visual disturbance.   Respiratory:  Negative for cough, chest tightness and shortness of breath.    Cardiovascular:  Negative for chest pain and palpitations.   Gastrointestinal:  Negative for abdominal pain, blood in stool, constipation and diarrhea.   Endocrine: Negative for heat intolerance and polydipsia. Cold intolerance: some.  Genitourinary:  Negative for dysuria and frequency.   Musculoskeletal:  Negative for back pain. Arthralgias: see HPI.  Skin:  Negative for rash.   Neurological:  Negative for syncope. Weakness: see HPI.  Hematological:  Does not bruise/bleed easily.   Psychiatric/Behavioral:  Negative for dysphoric mood, sleep disturbance and suicidal ideas. The patient is not nervous/anxious.      Objective   Vital Signs:  /60 (BP Location: Left arm, Patient Position: Sitting, Cuff Size: Adult)   Pulse 78   Ht 175.3 cm (69\")   Wt 63.5 kg (140 lb)   SpO2 98%   BMI 20.67 kg/m²     Physical Exam  Vitals and nursing note reviewed.   Constitutional:       General: She is not in acute distress.     Appearance: She is well-developed and well-groomed. She is not diaphoretic.   HENT:      Head: Normocephalic and atraumatic.      Jaw: No tenderness or pain on movement.      Right Ear: External ear normal. No decreased hearing noted. Impacted cerumen: cerumen blocking TM.      Left Ear: External ear normal. No decreased hearing noted. Left ear middle ear effusion: mild. Tympanic membrane is not erythematous.      Nose: Nose normal.      Right Sinus: No maxillary sinus tenderness or frontal sinus tenderness.      Left Sinus: No maxillary sinus tenderness or frontal sinus tenderness.      Mouth/Throat:      Mouth: Mucous membranes are moist.      Pharynx: No " oropharyngeal exudate or posterior oropharyngeal erythema.   Eyes:      Extraocular Movements: Extraocular movements intact.      Conjunctiva/sclera: Conjunctivae normal.      Pupils: Pupils are equal, round, and reactive to light.   Neck:      Thyroid: No thyromegaly.      Vascular: No carotid bruit.      Trachea: No tracheal deviation.   Cardiovascular:      Rate and Rhythm: Normal rate and regular rhythm.      Pulses: Normal pulses.      Heart sounds: Normal heart sounds. No murmur heard.  Pulmonary:      Effort: Pulmonary effort is normal. No respiratory distress.      Breath sounds: Normal breath sounds.   Abdominal:      General: Bowel sounds are normal.      Palpations: Abdomen is soft. There is no hepatomegaly or splenomegaly.      Tenderness: There is no abdominal tenderness. There is no guarding.   Musculoskeletal:         General: Normal range of motion.      Cervical back: Normal range of motion and neck supple. No bony tenderness.      Thoracic back: No bony tenderness.      Lumbar back: No bony tenderness.      Right lower leg: No edema.      Left lower leg: No edema.      Comments: Mild weakness of RUE compared to LUE   Lymphadenopathy:      Cervical: No cervical adenopathy.   Skin:     General: Skin is warm and dry.      Findings: No rash.   Neurological:      Mental Status: She is alert and oriented to person, place, and time.      Cranial Nerves: No cranial nerve deficit.      Coordination: Coordination normal.      Gait: Gait normal.      Deep Tendon Reflexes: Reflexes are normal and symmetric.   Psychiatric:         Mood and Affect: Mood normal.         Behavior: Behavior normal.         Thought Content: Thought content normal.         Cognition and Memory: Cognition normal.         Judgment: Judgment normal.      1/27/23 CMP WNL; CBC WNL; ferritin 44; iron 78           Assessment and Plan   Diagnoses and all orders for this visit:    1. Encounter for Medicare annual wellness exam (Primary)  -      Vitamin B12 & Folate  -     Lipid Panel With LDL / HDL Ratio  -     Comprehensive Metabolic Panel    2. Osteopenia, unspecified location  -     DEXA Bone Density Axial; Future    3. Other specified disorders of bone density and structure, other site  -     DEXA Bone Density Axial; Future    4. Dyslipidemia  Assessment & Plan:  Continue Atorvastatin daily.  Continue to work on healthy diet and exercise.    Orders:  -     Lipid Panel With LDL / HDL Ratio  -     Comprehensive Metabolic Panel    5. History of repair of mitral valve  Assessment & Plan:  Continue Metoprolol daily.  Follow up as scheduled with cardiology.    Orders:  -     Comprehensive Metabolic Panel    6. Vitamin B12 deficiency  -     Vitamin B12 & Folate    7. Moderate episode of recurrent major depressive disorder  Assessment & Plan:  Patient's depression is recurrent and is moderate without psychosis. Their depression is currently active and the condition is  stable . This will be reassessed at the next regular appointment. F/U as described:patient will continue current medication therapy.  Continue Duloxetine daily.      8. Pain in right hand  Assessment & Plan:  Stable.  Continue Diclofenac TID as needed.  Continue Baclofen twice daily.      9. Seizure (HCC)  Assessment & Plan:  Stable.  Continue Keppra twice daily.  Follow up as scheduled with neurology.      10. Spastic hemiplegia affecting dominant side  Assessment & Plan:  Stable.  Continue exercises per PT.  Follow up as scheduled with [facility].      11. Muscle hypertonicity  Assessment & Plan:  Continue Diclofenac TID as needed.  Continue Baclofen twice daily.  Follow up as scheduled with pain management.             Follow Up   Return in about 6 months (around 12/6/2023) for Recheck.or sooner if problems or concerns.    Patient was given instructions and counseling regarding her condition or for health maintenance advice. Please see specific information pulled into the AVS if  appropriate.

## 2023-06-07 LAB
ALBUMIN SERPL-MCNC: 4.5 G/DL (ref 3.8–4.8)
ALBUMIN/GLOB SERPL: 2 {RATIO} (ref 1.2–2.2)
ALP SERPL-CCNC: 96 IU/L (ref 44–121)
ALT SERPL-CCNC: 35 IU/L (ref 0–32)
AST SERPL-CCNC: 24 IU/L (ref 0–40)
BILIRUB SERPL-MCNC: 0.4 MG/DL (ref 0–1.2)
BUN SERPL-MCNC: 15 MG/DL (ref 6–24)
BUN/CREAT SERPL: 18 (ref 9–23)
CALCIUM SERPL-MCNC: 9.4 MG/DL (ref 8.7–10.2)
CHLORIDE SERPL-SCNC: 103 MMOL/L (ref 96–106)
CHOLEST SERPL-MCNC: 187 MG/DL (ref 100–199)
CO2 SERPL-SCNC: 21 MMOL/L (ref 20–29)
CREAT SERPL-MCNC: 0.82 MG/DL (ref 0.57–1)
EGFRCR SERPLBLD CKD-EPI 2021: 93 ML/MIN/1.73
FOLATE SERPL-MCNC: >20 NG/ML
GLOBULIN SER CALC-MCNC: 2.3 G/DL (ref 1.5–4.5)
GLUCOSE SERPL-MCNC: 81 MG/DL (ref 70–99)
HDLC SERPL-MCNC: 39 MG/DL
LDLC SERPL CALC-MCNC: 115 MG/DL (ref 0–99)
LDLC/HDLC SERPL: 2.9 RATIO (ref 0–3.2)
POTASSIUM SERPL-SCNC: 4 MMOL/L (ref 3.5–5.2)
PROT SERPL-MCNC: 6.8 G/DL (ref 6–8.5)
SODIUM SERPL-SCNC: 140 MMOL/L (ref 134–144)
TRIGL SERPL-MCNC: 190 MG/DL (ref 0–149)
VIT B12 SERPL-MCNC: >2000 PG/ML (ref 232–1245)
VLDLC SERPL CALC-MCNC: 33 MG/DL (ref 5–40)

## 2023-06-08 ENCOUNTER — PATIENT ROUNDING (BHMG ONLY) (OUTPATIENT)
Dept: FAMILY MEDICINE CLINIC | Facility: CLINIC | Age: 43
End: 2023-06-08
Payer: MEDICARE

## 2023-06-08 NOTE — PROGRESS NOTES
A My-Chart message has been sent to the patient for PATIENT ROUNDING with [facility] [place]

## 2023-06-09 DIAGNOSIS — E53.8 VITAMIN B12 DEFICIENCY: ICD-10-CM

## 2023-06-09 RX ORDER — LANOLIN ALCOHOL/MO/W.PET/CERES
1000 CREAM (GRAM) TOPICAL EVERY OTHER DAY
Start: 2023-06-09

## 2023-06-09 NOTE — ASSESSMENT & PLAN NOTE
Stable.  Continue exercises per PT.  Follow up as scheduled with [facility].

## 2023-06-09 NOTE — ASSESSMENT & PLAN NOTE
Continue Diclofenac TID as needed.  Continue Baclofen twice daily.  Follow up as scheduled with pain management.   denies

## 2023-06-09 NOTE — ASSESSMENT & PLAN NOTE
Patient's depression is recurrent and is moderate without psychosis. Their depression is currently active and the condition is  stable . This will be reassessed at the next regular appointment. F/U as described:patient will continue current medication therapy.  Continue Duloxetine daily.

## 2023-06-14 ENCOUNTER — TELEPHONE (OUTPATIENT)
Dept: FAMILY MEDICINE CLINIC | Facility: CLINIC | Age: 43
End: 2023-06-14
Payer: MEDICARE

## 2023-06-14 NOTE — TELEPHONE ENCOUNTER
OK FOR HUB TO READ    LMTCB    Overall, your lab results look good.  Continue current medications.  Your blood sugar, kidney function, and electrolytes were normal and your cholesterol looks pretty good.  Continue to work on healthy diet and exercise.  Your Vitamin B12 is over treated.  Decrease the Vitamin B12 supplement to every other day.  Follow up as we discussed at the office visit.  Let me know if you have any questions.     YONG Yan

## 2023-06-14 NOTE — TELEPHONE ENCOUNTER
----- Message from YONG Jefferson sent at 6/14/2023 12:06 AM EDT -----  Please call the patient regarding her lab results.

## 2023-07-12 NOTE — PROGRESS NOTES
"This provider is located at [address] [place] [address], [address], [place] [address]. The Patient is seen remotely using [employer]. Patient is being seen via telehealth and confirm that they are in a secure environment for this session. The patient's condition being diagnosed/treated is appropriate for telemedicine. The provider identified himself/herself: herself as well as her credentials.   The patient and mother gave consent to be seen remotely, and when consent is given they understand that the consent allows for patient identifiable information to be sent to a third party as needed.   They may refuse to be seen remotely at any time. The electronic data is encrypted and password protected, and the patient has been advised of the potential risks to privacy not withstanding such measures.    You have chosen to receive care through a telehealth visit.  Do you consent to use a video/audio connection for your medical care today? Yes    Subjective   Ania Reyes is a 42 y.o. female who presents today for follow up      Referring Provider:  Cinthia Mark, APRN  [address] [place] [address]    Chief Complaint:   Depression, anxiety    History of Present Illness:    6/2/23:  Patient presents today via MyChart Video visit from home, located in [place], [place].  Mother is present with patient new dog, \"Bandit\".  Patient continues to take Cymbalta 60 mg in the morning and 30 mg at night, which is tolerating well.  Patient mom has been putting her to work cutting the yard, and they both do yard work, and \"botox doctor is really impressed, said that was good for her.\" As they have been using a push mower due to riding mower breaking.  Patient reports weight of 155 lbs and appetite has improved.  Overall mood improved, more active, tolerating Cymbalta well without reported side effects.     Depression: Patient complains of depression. She complains of fatigue and insomnia  Denies symptoms of " "anxiety.      12/2/22:  Patient presents today via MyChart Video visit from home with mother Haydee, patient reports now living with mother in [place], [place].  Patient mother assisting with communication, and patient moved back approximately 1 month ago. Mother's dog has had puppies and had told patient she had to come to stay with mother to get a puppy.     Patient reports she is doing good, in regards to appetite and weight, mother confirms appetite has improved due to mother cooking and patient has started playing cards with group of friends at house, which patient enjoys.  On Mondays, they have a girl's day out and patient attends. \"I don't let her sit too much\".  Patient mother does not have a scale, and will have weight obtained next Tues at PCP appointment.      Patient has had increased activity as mother has patient doing several household chores, helping with puppies which is helpful and patient enjoys.        9/2/22:  Patient presents today via [employer] Video visit from outside of hot[facility] in [place] with sister Bianca. Patient reports move to [place] went well as patient is now living with sister and sister's son.      Patient reports current medication, Cymbalta has been effective in management of both depression and anxiety.  Denies sleep problems, nor side effects.   Rates self a 4 out 10 for depression, and 4 out of 10 for anxiety.    Denies seizure activity.   Per sister patient is \"upbeat about things, I have to stay on her about eating\". Weight gain of 5 lb since last visit.        6/2/22: Patient presents today in office with sister, Nicolette.   Patient has not eaten for 7 days, however, sister was able to get patient to eat last night and today.  Patient was out of Gabapentin which caused nausea, was able to  medication yesterday.  Apparently there was a discrepancy with the date and pharmacy. Patient will be moving back to [place] on Monday with sister, Bianca.  Patient has " "noticed clothes are loose, weight trend reviewed.  Due to heart condition patient is not to weigh more than 140 lbs.     Since patient began splitting dose of Cymbalta to 60 mg every morning and 30 mg every night patient no longer sleeping all day,  Patient feels overall mood has improved.  Sister has noticed positive changes, as patient interacts more, no longer just nodding head, now engaging more with others, and laughing.     Patient will be living off [facility] And wishes to do video visits due to length of drive.      5/5/22: Patient presents today in office with mother, Haydee.  Patient reports since increase of Cymbalta had 2 weeks of being awake all day then had 2 weeks of sleeping all day.  \"she seems to want to sleep all the time.\"  \"today I am tired.\" Mother reports this past Monday patient slept all day, and woke up an hour before bedtime, and went back to sleep.     Patient has moved in with mother completely, sister is trying to find a house and patient plans to move in with sister.  Patient likes living with mother though does not like the country.  Patient plans to move in with other sister, Bianca, which will be in [place].   Patient plans to move on 5/31/22.     Patient declines therapy due to aphasia.  Patient feels mood has improved though difficult to determine due to increased sleep pattern.      3/31/22: INITIAL EVAL  Patient presents today in office with sister, Nicolette Johnson, with a history of depression and anxiety.  Patient suffered a stroke 2015, first seizure occured 6 yrs after stroke in July or August of 2021, and 4 weeks later had another seizure.  Started on Keppra 9/2021. Currently is followed at Epilepsy clinic and by neurologist.  Patient with right hemiplegia, rle weakness, stability problems occasionally,  aphasia, and limited fine motor to right hand.     Patient dog, Rama, passed away 1 month ago, 2/27/22, from cancer at 5 yrs old.  Patient has been having " difficulty coping, as patient did not eat for the first 4 days after, and this past Monday and Tues patient went without eating. Minimal oral intake, drinking big red more than water.  Patient admits to clothes not fitting as well since food deprivation.   Patient admits to anhedonia, crying often, having difficulty getting out of bed, increased desire to sleep, and lacking motivation.     Stressors: [place], spouse who passed away 2 yrs ago, dog passing away, and losing home. Patient sister reports patient was a victim of cat fishing and now house has to be sold as patient sent out [phone] to a boyfriend online.  Patient reports being with boyfriend for 1 yr and 6 months, he was in the , all communication was through online social media, no telephone or video communication.     Patient appeared to do well with loss of , cat fishing episode, but when Rama was put down patient shut down.      Patient will be moving to [place] with mother in approximately 1 month, house has been sold, and all belongings must be out by April 27.  Sister lives in [facility] And assists with medical care and communication.      Before stroke patient only took vitamin d, has been on Cymbalta since after stroke for pain with nerves to right hand/arm prescribed by neurologist takes in the morning currently. Uncertain of start date, though, sister confirms for several years.           PHQ-9 Depression Screening  PHQ-9 Total Score:  6/1/2023 3     Little interest or pleasure in doing things? (P) 0-->not at all   Feeling down, depressed, or hopeless? (P) 0-->not at all   Trouble falling or staying asleep, or sleeping too much? (P) 2-->more than half the days   Feeling tired or having little energy? (P) 1-->several days   Poor appetite or overeating? (P) 0-->not at all   Feeling bad about yourself - or that you are a failure or have let yourself or your family down? (P) 0-->not at all   Trouble concentrating on things, such  as reading the newspaper or watching television? (P) 0-->not at all   Moving or speaking so slowly that other people could have noticed? Or the opposite - being so fidgety or restless that you have been moving around a lot more than usual? (P) 0-->not at all   Thoughts that you would be better off dead, or of hurting yourself in some way? (P) 0-->not at all   PHQ-9 Total Score (P) 3     CALIN-7  Feeling nervous, anxious or on edge: (P) Not at all  Not being able to stop or control worrying: (P) Not at all  Worrying too much about different things: (P) Not at all  Trouble Relaxing: (P) Not at all  Being so restless that it is hard to sit still: (P) Not at all  Feeling afraid as if something awful might happen: (P) Not at all  Becoming easily annoyed or irritable: (P) Not at all  CALIN 7 Total Score: (P) 0  If you checked any problems, how difficult have these problems made it for you to do your work, take care of things at home, or get along with other people: (P) Not difficult at all     Past Surgical History:  Past Surgical History:   Procedure Laterality Date   • CARDIAC SURGERY  2015   • CYSTOSCOPY URETEROSCOPY LASER LITHOTRIPSY     • KNEE SURGERY Left    • MITRAL VALVE REPAIR/REPLACEMENT  03/2015    annuloplasty ring, complex MV repair with significant intra-op and post-op complications   • OTHER SURGICAL HISTORY      Selective Arterial Catheterization    • PARATHYROID GLAND SURGERY  2014    Parathyroid resection       Problem List:  Patient Active Problem List   Diagnosis   • Spastic hemiplegia affecting dominant side   • Pain in right hand   • Muscle hypertonicity   • History of stroke   • History of repair of mitral valve   • High risk medication use   • Dyslipidemia   • Allergic rhinitis   • Right hemiplegia (HCC)   • Osteopenia   • Vitamin D deficiency   • History of parathyroid surgery   • Abnormality of gait following cerebrovascular accident (CVA)   • Unintentional weight loss   • Dizziness   • Heart failure  (HCC)   • Hypercalcemia   • Methicillin resistant Staphylococcus aureus infection   • Seizure (HCC)   • Anxiety   • Moderate episode of recurrent major depressive disorder   • Pain disorder associated with psychological factors   • Cold intolerance   • Vitamin B12 deficiency       Allergy:   No Known Allergies     Discontinued Medications:  Medications Discontinued During This Encounter   Medication Reason   • Cobalamin Combinations (B-12) 100-5000 MCG sublingual tablet Duplicate order   • DULoxetine (CYMBALTA) 30 MG capsule Reorder       Current Medications:   Current Outpatient Medications   Medication Sig Dispense Refill   • DULoxetine (CYMBALTA) 30 MG capsule Take 2 capsules by mouth Every Morning AND 1 capsule Every Night. Indications: Generalized Anxiety Disorder, Major Depressive Disorder 270 capsule 3   • aspirin 81 MG tablet Take 81 mg by mouth Daily.     • atorvastatin (LIPITOR) 20 MG tablet Take 20 mg by mouth Daily.     • baclofen (LIORESAL) 10 MG tablet Take 1 tablet by mouth 2 (Two) Times a Day As Needed for Muscle Spasms. 180 tablet 0   • diazePAM (DIASTAT ACUDIAL) 20 MG rectal kit INSERT 12.5MG INTO THE RECTUM ONCE FOR 1 DOSE FOR GENERALIZED SEIZURE LASTING OVER 3 MINUTES.     • diclofenac (VOLTAREN) 50 MG EC tablet Take 1 tablet by mouth 3 (Three) Times a Day As Needed (pain). for pain 270 tablet 1   • famotidine (PEPCID) 20 MG tablet Take 20 mg by mouth Daily.     • gabapentin (NEURONTIN) 600 MG tablet Take 600 mg by mouth 4 (Four) Times a Day.  5   • levETIRAcetam (KEPPRA) 500 MG tablet Take 500 mg by mouth 2 (Two) Times a Day.     • metoprolol succinate XL (TOPROL-XL) 25 MG 24 hr tablet Take 25 mg by mouth Daily.  3   • Multiple Vitamins-Minerals (MULTIVITAMIN ADULTS PO) Take 1 tablet by mouth Daily.     • potassium chloride (K-DUR) 10 MEQ CR tablet Take 10 mEq by mouth Daily.     • vitamin B-12 (CYANOCOBALAMIN) 1000 MCG tablet Take 1 tablet by mouth Daily.     • VITAMIN D, CHOLECALCIFEROL, PO  Take 1 tablet by mouth Daily.       No current facility-administered medications for this visit.       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 05/11/2015    Description: 02- - (N) Coronaries - LVEF 55% - Severe prolapse of the anterior & posterior mitral leaflet with severe MR,   • Functional cardiac murmur 04/14/2014   • Gingival enlargement 11/17/2016   • Gum hypertrophy    • Heart failure 08/13/2021    NYHA class 3 NYHA class 3 NYHA class 3 NYHA class 3   • High risk medication use    • History of acute sinusitis    • History of dizziness    • History of echocardiogram    • History of hyperparathyroidism    • History of kidney stones     Bilateral   • History of low back pain    • History of mitral valve repair     [email or web address] annuloplasty ring, complex MV repair with significant intra-op and post op complications   • History of nephrolithiasis    • History of stroke     LMCA; right hemiplegia   • History of transesophageal echocardiography (PRASHANT)    • Hydroureteronephrosis 12/21/2020 12/21/20 CT abd/pelvis: 4-5 mm obstructing stone located in the distal left ureter with mild-moderate left hydroureteronephrosis; tiny nonobstructing stones elsewhere in both kidneys   • Kidney stone     3mm    • Lactase deficiency    • Left flank pain 12/7/2020   • Left nephrolithiasis 08/12/2019   • Methicillin resistant Staphylococcus aureus infection 03/13/2015    No A2K system hx - now +MRSA sputum on 3/13/2015 No A2K system hx - now +MRSA sputum  on 3/13/2015 No A2K system hx - now +MRSA sputum on 3/13/2015 No A2K system hx - now +MRSA sputum on 3/13/2015   • Mitral regurgitation    • Mitral valve prolapse    • Muscle hypertonicity    • Need for SBE (subacute bacterial endocarditis) prophylaxis 2019   • Osteopenia    • Pain in right hand    • Pain, wrist joint    • Parathyroid adenoma    • Positive depression screening    • Primary hyperparathyroidism    • Right arm pain    • Right hemiplegia 2015   • Seizures    • Sinus tachycardia    • Spastic hemiplegia affecting dominant side    • Stroke May 2015   • Vitamin D deficiency         Past Psychiatric History:  Began Treatment:3/31/22  Diagnoses:Depression and Anxiety  Psychiatrist:Denies  Therapist:Denies  Admission History:Denies  Medication Trials:2015 after stroke for 6 months, unable to recal name  Self Harm: Denies  Suicide Attempts:Denies   Psychosis, Anxiety, Depression: n/a    Substance Abuse History:   Types:Denies all, including illicit  Withdrawal Symptoms:Denies  Longest Period Sober:Not Applicable   AA: Not applicable     Social History:  Martial Status:Single  for 2 yrs   Employed:No disabled  Kids:No  House:Lives in a house with mother in [place], [place] since approximately early 2022    History: Denies  Access to Guns:  no    Social History     Socioeconomic History   • Marital status:    Tobacco Use   • Smoking status: Never   • Smokeless tobacco: Never   Vaping Use   • Vaping Use: Never used   Substance and Sexual Activity   • Alcohol use: Yes     Alcohol/week: 1.0 standard drink     Types: 1 Drinks containing 0.5 oz of alcohol per week     Comment: social drinker   • Drug use: Never   • Sexual activity: Yes     Partners: Male     Birth control/protection: None       Family History:   Suicide Attempts: Denies  Suicide Completions:Denies      Family History   Problem Relation Age of Onset   • Depression Mother    • Anxiety disorder Mother    •  Coronary artery disease Mother    • Glaucoma Mother    • Nephrolithiasis Mother    • Heart disease Mother    • No Known Problems Father    • Anxiety disorder Sister    • Breast cancer Maternal Aunt         diagnosed in 40s   • Ovarian cancer Maternal Aunt    • Osteoporosis Maternal Aunt    • Coronary artery disease Maternal Grandfather    • Heart attack Maternal Grandfather          at age 54 years   • Colon cancer Maternal Grandmother         diagnosed in her 60's   • Breast cancer Maternal Grandmother    • Ovarian cancer Maternal Grandmother    • Colon cancer Paternal Grandfather    • No Known Problems Other        Developmental History:   Born: [place]  Siblings:1 brother, 1 sister  Childhood: Denies Abuse  High School:Completed  College:almost completed nursing, due to stroke (1/2 yr left)    Mental Status Exam:   Hygiene:   good  Cooperation:  Cooperative  Eye Contact:  Good  Psychomotor Behavior:  Appropriate  Affect:  Appropriate  Mood: euthymic  Speech:  Aphasic  Thought Process:  Goal directed  Thought Content:  Mood congruent  Suicidal:  None  Homicidal:  None  Hallucinations:  None  Delusion:  None  Memory:  Intact  Orientation:  Person, Place, Time and Situation  Reliability:  good  Insight:  Good  Judgement:  Good  Impulse Control:  Good  Physical/Medical Issues:  Yes Stroke with rt sided weakness, HLD, Sz 6 yrs post stroke, orthostatic hypotension, s/p MVR, spastic hemiplegia to rt dominant hand     Review of Systems:  Review of Systems   Constitutional: Negative for appetite change, chills, diaphoresis, fatigue and fever.   HENT: Negative for drooling, ear pain, postnasal drip, rhinorrhea and sore throat.    Eyes: Negative for visual disturbance.   Respiratory: Negative for cough, shortness of breath and wheezing.    Cardiovascular: Negative for chest pain, palpitations and leg swelling.   Gastrointestinal: Negative for nausea and vomiting.   Endocrine: Negative for cold intolerance and heat  intolerance.   Genitourinary: Negative for difficulty urinating.   Musculoskeletal: Negative for joint swelling and myalgias.   Skin: Negative for rash.   Allergic/Immunologic: Negative for immunocompromised state.   Neurological: Positive for speech difficulty. Negative for dizziness, seizures, numbness and headaches.   Psychiatric/Behavioral: Negative for decreased concentration, hallucinations, self-injury, sleep disturbance and suicidal ideas. The patient is not nervous/anxious.          Physical Exam:  Physical Exam  Psychiatric:         Attention and Perception: Attention and perception normal.         Mood and Affect: Mood and affect normal.         Speech: Speech is delayed.         Behavior: Behavior normal. Behavior is cooperative.         Thought Content: Thought content normal. Thought content does not include suicidal ideation. Thought content does not include suicidal plan.         Cognition and Memory: Cognition and memory normal.         Judgment: Judgment normal.      Comments: Aphasic secondary to stroke         Vital Signs:   There were no vitals taken for this visit.     Lab Results:   Orders Only on 01/27/2023   Component Date Value Ref Range Status   • Glucose 01/27/2023 88  70 - 99 mg/dL Final   • BUN 01/27/2023 15  6 - 24 mg/dL Final   • Creatinine 01/27/2023 0.74  0.57 - 1.00 mg/dL Final   • EGFR Result 01/27/2023 105  >59 mL/min/1.73 Final   • BUN/Creatinine Ratio 01/27/2023 20  9 - 23 Final   • Sodium 01/27/2023 140  134 - 144 mmol/L Final   • Potassium 01/27/2023 4.1  3.5 - 5.2 mmol/L Final   • Chloride 01/27/2023 102  96 - 106 mmol/L Final   • Total CO2 01/27/2023 24  20 - 29 mmol/L Final   • Calcium 01/27/2023 9.6  8.7 - 10.2 mg/dL Final   • Total Protein 01/27/2023 6.4  6.0 - 8.5 g/dL Final   • Albumin 01/27/2023 4.2  3.8 - 4.8 g/dL Final   • Globulin 01/27/2023 2.2  1.5 - 4.5 g/dL Final   • A/G Ratio 01/27/2023 1.9  1.2 - 2.2 Final   • Total Bilirubin 01/27/2023 0.2  0.0 - 1.2 mg/dL  Final   • Alkaline Phosphatase 01/27/2023 84  44 - 121 IU/L Final   • AST (SGOT) 01/27/2023 19  0 - 40 IU/L Final   • ALT (SGPT) 01/27/2023 29  0 - 32 IU/L Final   • Ferritin 01/27/2023 44  15 - 150 ng/mL Final   • WBC 01/27/2023 6.4  3.4 - 10.8 x10E3/uL Final   • RBC 01/27/2023 4.28  3.77 - 5.28 x10E6/uL Final   • Hemoglobin 01/27/2023 12.8  11.1 - 15.9 g/dL Final   • Hematocrit 01/27/2023 38.8  34.0 - 46.6 % Final   • MCV 01/27/2023 91  79 - 97 fL Final   • MCH 01/27/2023 29.9  26.6 - 33.0 pg Final   • MCHC 01/27/2023 33.0  31.5 - 35.7 g/dL Final   • RDW 01/27/2023 11.8  11.7 - 15.4 % Final   • Platelets 01/27/2023 205  150 - 450 x10E3/uL Final   • Neutrophil Rel % 01/27/2023 68  Not Estab. % Final   • Lymphocyte Rel % 01/27/2023 23  Not Estab. % Final   • Monocyte Rel % 01/27/2023 6  Not Estab. % Final   • Eosinophil Rel % 01/27/2023 2  Not Estab. % Final   • Basophil Rel % 01/27/2023 1  Not Estab. % Final   • Neutrophils Absolute 01/27/2023 4.3  1.4 - 7.0 x10E3/uL Final   • Lymphocytes Absolute 01/27/2023 1.5  0.7 - 3.1 x10E3/uL Final   • Monocytes Absolute 01/27/2023 0.4  0.1 - 0.9 x10E3/uL Final   • Eosinophils Absolute 01/27/2023 0.2  0.0 - 0.4 x10E3/uL Final   • Basophils Absolute 01/27/2023 0.1  0.0 - 0.2 x10E3/uL Final   • Immature Granulocyte Rel % 01/27/2023 0  Not Estab. % Final   • Immature Grans Absolute 01/27/2023 0.0  0.0 - 0.1 x10E3/uL Final   • Iron 01/27/2023 78  27 - 159 ug/dL Final   • Vitamin B-12 01/27/2023 311  232 - 1,245 pg/mL Final   • Folate 01/27/2023 >20.0  >3.0 ng/mL Final    Comment: A serum folate concentration of less than 3.1 ng/mL is  considered to represent clinical deficiency.     Office Visit on 12/06/2022   Component Date Value Ref Range Status   • Total Cholesterol 12/06/2022 188  100 - 199 mg/dL Final   • Triglycerides 12/06/2022 112  0 - 149 mg/dL Final   • HDL Cholesterol 12/06/2022 49  >39 mg/dL Final   • VLDL Cholesterol Miles 12/06/2022 20  5 - 40 mg/dL Final   • LDL  Chol Calc (NIH) 12/06/2022 119 (H)  0 - 99 mg/dL Final   • LDL/HDL RATIO 12/06/2022 2.4  0.0 - 3.2 ratio Final    Comment:                                     LDL/HDL Ratio                                              Men  Women                                1/2 Avg.Risk  1.0    1.5                                    Avg.Risk  3.6    3.2                                 2X Avg.Risk  6.2    5.0                                 3X Avg.Risk  8.0    6.1     • Glucose 12/06/2022 74  70 - 99 mg/dL Final   • BUN 12/06/2022 17  6 - 24 mg/dL Final   • Creatinine 12/06/2022 0.81  0.57 - 1.00 mg/dL Final   • EGFR Result 12/06/2022 94  >59 mL/min/1.73 Final   • BUN/Creatinine Ratio 12/06/2022 21  9 - 23 Final   • Sodium 12/06/2022 142  134 - 144 mmol/L Final   • Potassium 12/06/2022 4.3  3.5 - 5.2 mmol/L Final   • Chloride 12/06/2022 104  96 - 106 mmol/L Final   • Total CO2 12/06/2022 23  20 - 29 mmol/L Final   • Calcium 12/06/2022 9.7  8.7 - 10.2 mg/dL Final   • Total Protein 12/06/2022 6.6  6.0 - 8.5 g/dL Final   • Albumin 12/06/2022 4.6  3.8 - 4.8 g/dL Final   • Globulin 12/06/2022 2.0  1.5 - 4.5 g/dL Final   • A/G Ratio 12/06/2022 2.3 (H)  1.2 - 2.2 Final   • Total Bilirubin 12/06/2022 0.4  0.0 - 1.2 mg/dL Final   • Alkaline Phosphatase 12/06/2022 102  44 - 121 IU/L Final   • AST (SGOT) 12/06/2022 22  0 - 40 IU/L Final   • ALT (SGPT) 12/06/2022 24  0 - 32 IU/L Final   • 25 Hydroxy, Vitamin D 12/06/2022 53.8  30.0 - 100.0 ng/mL Final    Comment: Vitamin D deficiency has been defined by the [place] of  Medicine and an Endocrine Society practice guideline as a  level of serum 25-OH vitamin D less than 20 ng/mL (1,2).  The Endocrine Society went on to further define vitamin D  insufficiency as a level between 21 and 29 ng/mL (2).  1. IOM ([place] of Medicine). 2010. Dietary reference     intakes for calcium and D. [place]: The     National Academies Press.  2. Carole SMITH, Angle COOL, Nataliia LEMUS, et al.      Retention Suture Bite Size: 3 mm Evaluation, treatment, and prevention of vitamin D     deficiency: an Endocrine Society clinical practice     guideline. JCEM. 2011 Jul; 96(7):1911-30.     • TSH 12/06/2022 3.410  0.450 - 4.500 uIU/mL Final       EKG Results:  No orders to display       Imaging Results:  XR Chest PA & Lateral    Result Date: 3/13/2022  No focal pulmonary consolidation. Follow-up as clinical indications persist.  This report was finalized on 3/13/2022 10:34 AM by Dr. Meliton Simental M.D.          Assessment & Plan   Diagnoses and all orders for this visit:    1. Recurrent major depressive disorder, in full remission (Primary)  -     DULoxetine (CYMBALTA) 30 MG capsule; Take 2 capsules by mouth Every Morning AND 1 capsule Every Night. Indications: Generalized Anxiety Disorder, Major Depressive Disorder  Dispense: 270 capsule; Refill: 3    2. Generalized anxiety disorder  -     DULoxetine (CYMBALTA) 30 MG capsule; Take 2 capsules by mouth Every Morning AND 1 capsule Every Night. Indications: Generalized Anxiety Disorder, Major Depressive Disorder  Dispense: 270 capsule; Refill: 3        Visit Diagnoses:    ICD-10-CM ICD-9-CM   1. Recurrent major depressive disorder, in full remission  F33.42 296.36   2. Generalized anxiety disorder  F41.1 300.02       PLAN:  1. Safety: No acute safety concerns  2. Therapy: Declines   3. Risk Assessment: Risk of self-harm acutely is low.  Risk factors include anxiety disorder, mood disorder, and recent psychosocial stressors (pandemic). Protective factors include no family history, denies access to guns/weapons, no present SI, no history of suicide attempts or self-harm in the past, minimal AODA, healthcare seeking, future orientation, willingness to engage in care.  Risk of self-harm chronically is also low, but could be further elevated in the event of treatment noncompliance and/or AODA.  4. Meds: Continue Cymbalta 60 mg by mouth daily every morning and 30 mg by mouth nightly to target depression  with anxiety.  Refilled today    5. Labs: n/a    Symptoms of anxiety, depression are under good control with current medication regimen.  Overall mood is stable and in remission.   Patient was given instructions and counseling regarding condition and for health maintenance advice. Please see specific information pulled into the AVS if appropriate.    Patient to contact provider if symptoms worsen or fail to improve.      12/2/23:   Continue Cymbalta 60 mg by mouth daily every morning and 30 mg by mouth nightly to target chronic pain as previously ordered per neurologist and depression with anxiety.  Mother to contact new pharmacy in [place] for transfer of medications.   Overall mood improved, symptoms of anxiety and depression are under good control with Cymbalta.  New living arrangement with mother has been positive. Mother given direct contact number to [place] office.   Patient to contact provider if symptoms worsen or fail to improve.       9/2/22:   Declines therapy  Continue Cymbalta 60 mg by mouth daily every morning and 30 mg by mouth nightly  to target chronic pain as previously ordered per neurologist and depression with anxiety.  Patient has adequate refills until early Oct. For which patient has been instructed to contact pharmacy for refill.  Updated patient pharmacies   Patient doing well, mood improved, tolerating Cymbalta without side effects, continues to struggle with appetite, though noted 5 lb weight gain since last visit per EHR which noted weight of 139 7/25/22.  Will see patient back in 3 months or sooner if symptoms worsen or fail to improve.     6/2/22:   -Continue Cymbalta 60 mg by mouth daily every morning and 30 mg by mouth nightly  to target chronic pain as previously ordered per neurologist and depression with anxiety.     Patient doing well with current medication regimen, will be moving to [place] with sister soon.  Discussed option for telehealth video visits and patient  agrees with plan, informed patient and sister of need to have an in person visit every 12 months per medicare guidelines.     5/5/22:   Change Cymbalta from 90 mg by mouth daily to 60 mg by mouth every morning and 30 mg by mouth nightly  to target chronic pain as previously ordered per neurologist and depression with anxiety.  Patient experiencing increased sedation with one total dose of 90 mg, will split doses. Overall mood improved with increase thus far.  Therapy-offered and encouraged today; patient to contact provider if later decides; would need one in [place] as patient will be relocating at the end of the month    3/31/22:   Increase Cymbalta from 60 mg to 90 mg by mouth daily in the morning to target chronic pain as previously ordered per neurologist and depression with anxiety. Discussed all risks, benefits, alternatives, and side effects of Duloxetine including but not limited to GI upset, sexual dysfunction, bleeding risk, seizure risk, weight loss, insomnia, diaphoresis, drowsiness, headache, dizziness, fatigue, activation of radha or hypomania, increased fragility fracture risk, hyponatremia, increased BP, hepatotoxicity, ocular effects, withdrawal syndrome following abrupt discontinuation, serotonin syndrome, and activation of suicidal ideation and behavior. Pt educated on the need to practice safe sex while taking this med. Discussed the need for pt to immediately call the office for any new or worsening symptoms, such as worsening depression; feeling nervous or restless; suicidal thoughts or actions; or other changes changes in mood or behavior, and all other concerns. Pt educated on med compliance and the risks of suddenly stopping this medication or missing doses. Pt verbalized understanding and is agreeable to taking Duloxetine. Addressed all questions and concerns.  Will see patient back in 5 weeks as patient will be moving out of home into mother's home and must have all belongings out of  home by 4/27/22.        Patient screened positive for depression based on a PHQ-9 score of 3 on 6/1/2023. Follow-up recommendations include: Prescribed antidepressant medication treatment and Suicide Risk Assessment performed.       TREATMENT PLAN/GOALS: Continue supportive psychotherapy efforts and medications as indicated. Treatment and medication options discussed during today's visit. Patient acknowledged and verbally consented to continue with current treatment plan and was educated on the importance of compliance with treatment and follow-up appointments.    MEDICATION ISSUES:  LAMONTE reviewed as expected.  Discussed medication options and treatment plan of prescribed medication as well as the risks, benefits, and side effects including potential falls, possible impaired driving and metabolic adversities among others. Patient is agreeable to call the office with any worsening of symptoms or onset of side effects. Patient is agreeable to call 911 or go to the nearest ER should he/she begin having SI/HI. No medication side effects or related complaints today.     MEDS ORDERED DURING VISIT:  New Medications Ordered This Visit   Medications   • DULoxetine (CYMBALTA) 30 MG capsule     Sig: Take 2 capsules by mouth Every Morning AND 1 capsule Every Night. Indications: Generalized Anxiety Disorder, Major Depressive Disorder     Dispense:  270 capsule     Refill:  3       Return in about 1 year (around 6/2/2024).         I spent 24 minutes caring for Ania on this date of service. This time includes time spent by me in the following activities: preparing for the visit, reviewing tests, obtaining and/or reviewing a separately obtained history, performing a medically appropriate examination and/or evaluation, counseling and educating the patient/family/caregiver, ordering medications, tests, or procedures, referring and communicating with other health care professionals, documenting information in the medical record and  care coordination.      This document has been electronically signed by YONG Aranda  June 2, 2023 14:42 EDT      Part of this note may be an electronic transcription/translation of spoken language to printed text using the Dragon Dictation System.

## 2023-08-17 DIAGNOSIS — M62.89 MUSCLE HYPERTONICITY: ICD-10-CM

## 2023-08-17 DIAGNOSIS — G81.10 SPASTIC HEMIPLEGIA AFFECTING DOMINANT SIDE: ICD-10-CM

## 2023-08-17 DIAGNOSIS — M79.641 PAIN IN RIGHT HAND: ICD-10-CM

## 2023-08-17 RX ORDER — BACLOFEN 10 MG/1
TABLET ORAL
Qty: 180 TABLET | Refills: 0 | Status: SHIPPED | OUTPATIENT
Start: 2023-08-17

## 2023-08-17 NOTE — TELEPHONE ENCOUNTER
Rx Refill Note  Requested Prescriptions     Pending Prescriptions Disp Refills    baclofen (LIORESAL) 10 MG tablet [Pharmacy Med Name: BACLOFEN 10MG] 180 tablet 0     Sig: TAKE 1 TABLET BY MOUTH TWICE DAILY AS NEEDED FOR MUSCLE SPASMS      Last office visit with prescribing clinician: 6/6/2023   Last telemedicine visit with prescribing clinician: Visit date not found   Next office visit with prescribing clinician: 12/12/2023                         Would you like a call back once the refill request has been completed: [] Yes [] No    If the office needs to give you a call back, can they leave a voicemail: [] Yes [] No    Rosalee Martinez MA  08/17/23, 16:10 EDT

## 2023-11-29 DIAGNOSIS — G81.10 SPASTIC HEMIPLEGIA AFFECTING DOMINANT SIDE: ICD-10-CM

## 2023-11-29 DIAGNOSIS — M79.641 PAIN IN RIGHT HAND: ICD-10-CM

## 2023-11-29 DIAGNOSIS — M62.89 MUSCLE HYPERTONICITY: ICD-10-CM

## 2023-11-29 RX ORDER — BACLOFEN 10 MG/1
TABLET ORAL
Qty: 180 TABLET | Refills: 0 | Status: SHIPPED | OUTPATIENT
Start: 2023-11-29

## 2023-12-06 ENCOUNTER — OFFICE VISIT (OUTPATIENT)
Dept: FAMILY MEDICINE CLINIC | Facility: CLINIC | Age: 43
End: 2023-12-06
Payer: MEDICARE

## 2023-12-06 VITALS
SYSTOLIC BLOOD PRESSURE: 112 MMHG | DIASTOLIC BLOOD PRESSURE: 72 MMHG | HEIGHT: 69 IN | OXYGEN SATURATION: 99 % | WEIGHT: 156 LBS | BODY MASS INDEX: 23.11 KG/M2 | HEART RATE: 72 BPM

## 2023-12-06 DIAGNOSIS — E53.8 VITAMIN B12 DEFICIENCY: ICD-10-CM

## 2023-12-06 DIAGNOSIS — R26.9 ABNORMALITY OF GAIT FOLLOWING CEREBROVASCULAR ACCIDENT (CVA): ICD-10-CM

## 2023-12-06 DIAGNOSIS — Z98.890 HISTORY OF REPAIR OF MITRAL VALVE: ICD-10-CM

## 2023-12-06 DIAGNOSIS — I69.398 ABNORMALITY OF GAIT FOLLOWING CEREBROVASCULAR ACCIDENT (CVA): ICD-10-CM

## 2023-12-06 DIAGNOSIS — Z23 FLU VACCINE NEED: ICD-10-CM

## 2023-12-06 DIAGNOSIS — M79.641 PAIN IN RIGHT HAND: ICD-10-CM

## 2023-12-06 DIAGNOSIS — R42 DIZZINESS: ICD-10-CM

## 2023-12-06 DIAGNOSIS — F41.9 ANXIETY DISORDER, UNSPECIFIED TYPE: ICD-10-CM

## 2023-12-06 DIAGNOSIS — E78.5 DYSLIPIDEMIA: ICD-10-CM

## 2023-12-06 DIAGNOSIS — R55 SYNCOPE, UNSPECIFIED SYNCOPE TYPE: Primary | ICD-10-CM

## 2023-12-06 DIAGNOSIS — F33.1 MODERATE EPISODE OF RECURRENT MAJOR DEPRESSIVE DISORDER: ICD-10-CM

## 2023-12-06 DIAGNOSIS — R63.4 UNINTENTIONAL WEIGHT LOSS: ICD-10-CM

## 2023-12-06 DIAGNOSIS — G81.10 SPASTIC HEMIPLEGIA AFFECTING DOMINANT SIDE: ICD-10-CM

## 2023-12-06 DIAGNOSIS — Z98.890 HISTORY OF PARATHYROID SURGERY: ICD-10-CM

## 2023-12-06 DIAGNOSIS — E55.9 VITAMIN D DEFICIENCY: ICD-10-CM

## 2023-12-06 DIAGNOSIS — M85.80 OSTEOPENIA, UNSPECIFIED LOCATION: ICD-10-CM

## 2023-12-06 DIAGNOSIS — R56.9 SEIZURE: ICD-10-CM

## 2023-12-06 PROCEDURE — 99214 OFFICE O/P EST MOD 30 MIN: CPT | Performed by: NURSE PRACTITIONER

## 2023-12-06 PROCEDURE — 1160F RVW MEDS BY RX/DR IN RCRD: CPT | Performed by: NURSE PRACTITIONER

## 2023-12-06 PROCEDURE — G0008 ADMIN INFLUENZA VIRUS VAC: HCPCS | Performed by: NURSE PRACTITIONER

## 2023-12-06 PROCEDURE — 1159F MED LIST DOCD IN RCRD: CPT | Performed by: NURSE PRACTITIONER

## 2023-12-06 PROCEDURE — 93000 ELECTROCARDIOGRAM COMPLETE: CPT | Performed by: NURSE PRACTITIONER

## 2023-12-06 PROCEDURE — 90686 IIV4 VACC NO PRSV 0.5 ML IM: CPT | Performed by: NURSE PRACTITIONER

## 2023-12-06 NOTE — PROGRESS NOTES
Answers submitted by the patient for this visit:  Other (Submitted on 11/29/2023)  Please describe your symptoms.: Physical--too soon today  Have you had these symptoms before?: No  How long have you been having these symptoms?: 1-4 days  Primary Reason for Visit (Submitted on 11/29/2023)  What is the primary reason for your visit?: Other    Subjective   Ania Reyes is a 43 y.o. female.     Chief Complaint   Patient presents with    Hyperlipidemia     Follow up    Dizziness       History of Present Illness   Patient presents for follow up S/P MVR: takes Metoprolol daily; does not monitor BP; no headaches; no swelling; has episodes of dizziness a lot; will be walking and and get dizzy; not necessarily related to position changes; 2 weeks ago, got dizzy and passed out; everything went black and woke up on the floor; bruised right upper arm and right lower back/buttock; does not think hit head; no decreased ROM or problem with right arm or hip; has bruising on right upper arm and upper right buttock; no particular time of day that gets dizzy; this was first episode like this; thinks whole episode lasted 2 minutes; no lack of memory of what happened and no increased fatigue after episode; sees Dr. Mccurdy cardiology annually.     F/U dyslipidemia: takes Atorvastatin daily; no myalgias; watches saturated fats some, does not eat much cheese or ice cream; had regular activity in yard until weather colder, was pushing ; non-fasting today.     F/U right hand pain/muscle hypertonicity: takes Baclofen twice daily and Diclofenac three times daily and help; has been taking Baclofen in early morning and again around 1000 to prevent hand drawing up around 0930 and works well; also takes Gabapentin QID and works well; sees pain management; gets Botox injections in right hand and foot per Dr. Trammell at [facility] [place] and helps, but does not last more than 1 month; will be trying an alternative new medication; has not  been wearing brace to stretch hand as recommended per PT like should; sees pain management.     F/U seizures/spastic hemiplegia/history of stroke: takes Keppra twice daily; sees neurology Dr. Ortega and goes to Epilepsy clinic; no seizures since has been on medication.     F/U depression/anxiety: takes Duloxetine twice daily; takes 60 mg at 1 p.m. and 30 mg at bedtime and working well; sees psychiatry.     F/U weight loss: weight has been stable; has been eating better; living with mom.      Mother was present during the history-taking and subsequent discussion with this patient.  Patient agrees to the presence of the individual during this visit.      The following portions of the patient's history were reviewed and updated as appropriate: allergies, current medications, past family history, past medical history, past social history, past surgical history and problem list.    Current Outpatient Medications on File Prior to Visit   Medication Sig    aspirin 81 MG tablet Take 1 tablet by mouth Daily.    atorvastatin (LIPITOR) 20 MG tablet Take 1 tablet by mouth Daily.    baclofen (LIORESAL) 10 MG tablet TAKE 1 TABLET BY MOUTH TWICE DAILY AS NEEDED FOR MUSCLE SPASMS    diazePAM (DIASTAT ACUDIAL) 20 MG rectal kit INSERT 12.5MG INTO THE RECTUM ONCE FOR 1 DOSE FOR GENERALIZED SEIZURE LASTING OVER 3 MINUTES.    diclofenac (VOLTAREN) 50 MG EC tablet Take 1 tablet by mouth 3 (Three) Times a Day As Needed (pain). for pain    DULoxetine (CYMBALTA) 30 MG capsule Take 2 capsules by mouth Every Morning AND 1 capsule Every Night. Indications: Generalized Anxiety Disorder, Major Depressive Disorder    famotidine (PEPCID) 20 MG tablet Take 1 tablet by mouth Daily.    gabapentin (NEURONTIN) 600 MG tablet Take 1 tablet by mouth 4 (Four) Times a Day.    levETIRAcetam (KEPPRA) 500 MG tablet Take 1 tablet by mouth 2 (Two) Times a Day.    metoprolol succinate XL (TOPROL-XL) 25 MG 24 hr tablet Take 1 tablet by mouth Daily.    Multiple  Vitamins-Minerals (MULTIVITAMIN ADULTS PO) Take 1 tablet by mouth Daily.    potassium chloride (K-DUR) 10 MEQ CR tablet Take 1 tablet by mouth Daily.    vitamin B-12 (CYANOCOBALAMIN) 1000 MCG tablet Take 1 tablet by mouth Every Other Day.    VITAMIN D, CHOLECALCIFEROL, PO Take 1 tablet by mouth Daily.     No current facility-administered medications on file prior to visit.        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 05/11/2015    Description: 02- - (N) Coronaries - LVEF 55% - Severe prolapse of the anterior & posterior mitral leaflet with severe MR,    Functional cardiac murmur 04/14/2014    Gingival enlargement 11/17/2016    Gum hypertrophy     Heart failure 08/13/2021    NYHA class 3 NYHA class 3 NYHA class 3 NYHA class 3    High risk medication use     History of acute sinusitis     History of dizziness     History of echocardiogram     History of hyperparathyroidism     History of kidney stones     Bilateral    History of low back pain     History of mitral valve repair     [employer] annuloplasty ring, complex MV repair with significant intra-op and post op complications    History of nephrolithiasis     History of stroke     LMCA; right hemiplegia    History of transesophageal echocardiography (PRASHANT)     Hydroureteronephrosis 12/21/2020 12/21/20 CT abd/pelvis: 4-5 mm obstructing stone located in the distal left ureter with mild-moderate left hydroureteronephrosis; tiny nonobstructing stones elsewhere in both kidneys    Kidney stone     3mm     Lactase  deficiency     Left flank pain 2020    Left nephrolithiasis 2019    Methicillin resistant Staphylococcus aureus infection 2015    No A2K system hx - now +MRSA sputum on 3/13/2015    Mitral regurgitation     Mitral valve prolapse     Muscle hypertonicity     Need for SBE (subacute bacterial endocarditis) prophylaxis 2019    Osteopenia     Pain in right hand     Pain, wrist joint     Parathyroid adenoma     Positive depression screening     Primary hyperparathyroidism     Right arm pain     Right hemiplegia 2015    Seizures     Sinus tachycardia     Spastic hemiplegia affecting dominant side     Stroke 2015    Vitamin D deficiency        Past Surgical History:   Procedure Laterality Date    CARDIAC SURGERY      CYSTOSCOPY URETEROSCOPY LASER LITHOTRIPSY      KNEE SURGERY Left     MITRAL VALVE REPAIR/REPLACEMENT  2015    annuloplasty ring, complex MV repair with significant intra-op and post-op complications    OTHER SURGICAL HISTORY      Selective Arterial Catheterization     PARATHYROID GLAND SURGERY      Parathyroid resection       Family History   Problem Relation Age of Onset    Depression Mother     Anxiety disorder Mother     Coronary artery disease Mother     Glaucoma Mother     Nephrolithiasis Mother     Heart disease Mother     No Known Problems Father     Anxiety disorder Sister     Breast cancer Maternal Aunt         diagnosed in 40s    Ovarian cancer Maternal Aunt     Osteoporosis Maternal Aunt     Coronary artery disease Maternal Grandfather     Heart attack Maternal Grandfather          at age 54 years    Colon cancer Maternal Grandmother         diagnosed in her 60's    Breast cancer Maternal Grandmother     Ovarian cancer Maternal Grandmother     Colon cancer Paternal Grandfather     No Known Problems Other        Social History     Socioeconomic History    Marital status:    Tobacco Use    Smoking status: Never    Smokeless tobacco: Never  "  Vaping Use    Vaping Use: Never used   Substance and Sexual Activity    Alcohol use: Yes     Alcohol/week: 1.0 standard drink of alcohol     Types: 1 Drinks containing 0.5 oz of alcohol per week     Comment: social drinker    Drug use: Never    Sexual activity: Yes     Partners: Male     Birth control/protection: None       Review of Systems   Constitutional:  Negative for appetite change, chills, fever, unexpected weight gain and unexpected weight loss. Fatigue: some around time of menses.  HENT:  Negative for ear pain, sinus pressure, sore throat and trouble swallowing.    Eyes:  Negative for blurred vision.   Respiratory:  Negative for cough, chest tightness and shortness of breath.    Cardiovascular:  Negative for chest pain and palpitations.   Gastrointestinal:  Negative for abdominal pain, blood in stool, constipation, diarrhea, GERD and indigestion.   Endocrine: Negative for cold intolerance, heat intolerance and polydipsia.   Genitourinary:  Negative for dysuria and frequency.   Musculoskeletal:  Back pain: has chronic discomfort in right lower back, no radiation of pain down leg; no weakness in leg; no bladder or bowel dysfunction.   Skin:  Negative for rash.   Neurological:  Positive for syncope (see HPI).   Hematological:  Does not bruise/bleed easily.       Objective   Vitals:    12/06/23 1258   BP: 112/72   BP Location: Left arm   Patient Position: Sitting   Cuff Size: Adult   Pulse: 72   SpO2: 99%   Weight: 70.8 kg (156 lb)   Height: 175.3 cm (69\")     Body mass index is 23.04 kg/m².    Physical Exam  Vitals and nursing note reviewed.   Constitutional:       General: She is not in acute distress.     Appearance: She is well-developed and well-groomed. She is not diaphoretic.   HENT:      Head: Normocephalic and atraumatic.      Jaw: No tenderness or pain on movement.      Right Ear: Tympanic membrane and external ear normal. No decreased hearing noted.      Left Ear: Tympanic membrane and external " ear normal. No decreased hearing noted.      Nose: Nose normal.      Right Sinus: No maxillary sinus tenderness or frontal sinus tenderness.      Left Sinus: No maxillary sinus tenderness or frontal sinus tenderness.      Mouth/Throat:      Mouth: Mucous membranes are moist.      Pharynx: No oropharyngeal exudate or posterior oropharyngeal erythema.   Eyes:      Extraocular Movements: Extraocular movements intact.      Conjunctiva/sclera: Conjunctivae normal.      Pupils: Pupils are equal, round, and reactive to light.   Neck:      Thyroid: No thyromegaly.      Vascular: No carotid bruit.      Trachea: No tracheal deviation.   Cardiovascular:      Rate and Rhythm: Normal rate and regular rhythm.      Pulses: Normal pulses.      Heart sounds: Normal heart sounds. No murmur heard.  Pulmonary:      Effort: Pulmonary effort is normal. No respiratory distress.      Breath sounds: Normal breath sounds.   Abdominal:      General: Bowel sounds are normal.      Palpations: Abdomen is soft. There is no hepatomegaly or splenomegaly.      Tenderness: There is no abdominal tenderness. There is no right CVA tenderness, left CVA tenderness or guarding.   Musculoskeletal:         General: Normal range of motion.      Cervical back: Normal range of motion and neck supple. No bony tenderness.      Thoracic back: No bony tenderness.      Lumbar back: No bony tenderness.      Right lower leg: No edema.      Left lower leg: No edema.   Lymphadenopathy:      Cervical: No cervical adenopathy.   Skin:     General: Skin is warm and dry.      Findings: No rash.   Neurological:      Mental Status: She is alert and oriented to person, place, and time.      Cranial Nerves: No cranial nerve deficit.      Motor: Weakness: mild weakness in RUE compared to LUE.      Coordination: Coordination normal.      Gait: Gait normal.      Deep Tendon Reflexes: Reflexes are normal and symmetric.   Psychiatric:         Mood and Affect: Mood normal.          Behavior: Behavior normal.         Thought Content: Thought content normal.         Cognition and Memory: Cognition normal.         Judgment: Judgment normal.           ECG 12 Lead    Date/Time: 12/6/2023 1:16 PM  Performed by: Cinthia Mark APRN    Authorized by: Cinthia Mark APRN  Comparison: not compared with previous ECG   Previous ECG: no previous ECG available  Rhythm: sinus rhythm  Rate: normal  T Waves: T waves normal    Clinical impression: normal ECG      6/6/23 CMP WNL except ALT 35; lipid panel: , Trig 190, HDL 39,     Lab Results   Component Value Date    COLORU Yellow 07/25/2022    CLARITYU Clear 07/25/2022    LEUKOCYTESUR Negative 07/25/2022    BILIRUBINUR Negative 07/25/2022          Assessment    Problem List Items Addressed This Visit       Spastic hemiplegia affecting dominant side    Current Assessment & Plan     Continue Baclofen twice daily.  Follow up as scheduled with rehab specialist.         Pain in right hand    Current Assessment & Plan     Stable.  Continue Baclofen twice daily and Diclofenac 3 times daily.  Follow up as scheduled with rehab specialist and pain management.         History of repair of mitral valve    Overview     Description: [employer] annuloplasty ring, complex MV repair with significant intra-op and post-op complications         Current Assessment & Plan     Stable.  Continue Metoprolol daily.  Schedule a follow up appointment with cardiology.         Dyslipidemia    Current Assessment & Plan     Continue Atorvastatin daily.  Continue to work on healthy diet and exercise.         Relevant Orders    Lipid Panel With LDL / HDL Ratio (Completed)    Comprehensive Metabolic Panel (Completed)    Osteopenia    Current Assessment & Plan     Continue Vitamin D daily.  Continue weight bearing exercise.         Vitamin D deficiency    Relevant Orders    Vitamin D,25-Hydroxy (Completed)    History of parathyroid surgery    Relevant Orders    TSH Rfx On  Abnormal To Free T4 (Completed)    Abnormality of gait following cerebrovascular accident (CVA)    Current Assessment & Plan     Follow up as schedule with rehab specialist.         Dizziness    Current Assessment & Plan     Try lower dose of Gabapentin and see if helps dizziness.   Check with pain management.  Increase intake of clear liquids.         Relevant Orders    CBC & Differential (Completed)    TSH Rfx On Abnormal To Free T4 (Completed)    Seizure (HCC)    Overview     First seizure 4/1/21 and second June 2021         Current Assessment & Plan     Stable.  Continue Keppra twice daily.  Follow up as scheduled with neurology.         Relevant Orders    TSH Rfx On Abnormal To Free T4 (Completed)    Anxiety disorder    Current Assessment & Plan     Psychological condition is  stable .  Continue current treatment regimen.  Psychological condition  will be reassessed at the next regular appointment.  Continue Duloxetine twice daily.  Follow up as scheduled with psychiatry.         Relevant Orders    TSH Rfx On Abnormal To Free T4 (Completed)    Moderate episode of recurrent major depressive disorder    Current Assessment & Plan     Patient's depression is recurrent and is moderate without psychosis. Their depression is currently in partial remission and the condition is  stable . This will be reassessed at the next regular appointment. F/U as described:patient will continue current medication therapy.  Continue Duloxetine twice daily.  Follow up as scheduled with psychiatry.         Vitamin B12 deficiency    Relevant Orders    Vitamin B12 & Folate (Completed)    Syncope - Primary    Current Assessment & Plan     Change positions slowly.  Increase intake of clear liquids.  Schedule a follow up with cardiology.         Relevant Orders    ECG 12 Lead (Completed)    CBC & Differential (Completed)    Comprehensive Metabolic Panel (Completed)    TSH Rfx On Abnormal To Free T4 (Completed)    RESOLVED: Unintentional  weight loss     Other Visit Diagnoses       Flu vaccine need        Relevant Orders    Fluzone (or Fluarix & Flulaval for VFC) >6 Mos (3066-7540) (Completed)             Return in about 3 months (around 3/6/2024) for well woman exam with PAP.or sooner if symptoms persist or worsen.  Patient thinks whole episode surrounding episode of syncope lasted about 2 minutes; no lack of memory of what happened before or after episode and no increased fatigue after episode to indicate related to seizure; will check labs today; recommended to try lower dose of Gabapentin or take TID instead of QID and see if helps dizziness.

## 2023-12-06 NOTE — PATIENT INSTRUCTIONS
Consider lower dose of Gabapentin and see if helps dizziness.   Check with pain management.  Monitor your blood pressure periodically and record results.  Continue to work on healthy diet and exercise.  Follow up pending lab results.  Follow up in 3 months for PAP, or sooner if symptoms persist or worsen.   Schedule a follow up with cardiology.

## 2023-12-07 PROBLEM — R55 SYNCOPE: Status: ACTIVE | Noted: 2023-12-07

## 2023-12-07 PROBLEM — R63.4 UNINTENTIONAL WEIGHT LOSS: Status: RESOLVED | Noted: 2021-03-02 | Resolved: 2023-12-07

## 2023-12-07 LAB
25(OH)D3+25(OH)D2 SERPL-MCNC: 46.7 NG/ML (ref 30–100)
ALBUMIN SERPL-MCNC: 4.1 G/DL (ref 3.9–4.9)
ALBUMIN/GLOB SERPL: 1.6 {RATIO} (ref 1.2–2.2)
ALP SERPL-CCNC: 97 IU/L (ref 44–121)
ALT SERPL-CCNC: 22 IU/L (ref 0–32)
AST SERPL-CCNC: 16 IU/L (ref 0–40)
BASOPHILS # BLD AUTO: 0.1 X10E3/UL (ref 0–0.2)
BASOPHILS NFR BLD AUTO: 1 %
BILIRUB SERPL-MCNC: 0.2 MG/DL (ref 0–1.2)
BUN SERPL-MCNC: 15 MG/DL (ref 6–24)
BUN/CREAT SERPL: 17 (ref 9–23)
CALCIUM SERPL-MCNC: 9.4 MG/DL (ref 8.7–10.2)
CHLORIDE SERPL-SCNC: 103 MMOL/L (ref 96–106)
CHOLEST SERPL-MCNC: 187 MG/DL (ref 100–199)
CO2 SERPL-SCNC: 25 MMOL/L (ref 20–29)
CREAT SERPL-MCNC: 0.89 MG/DL (ref 0.57–1)
EGFRCR SERPLBLD CKD-EPI 2021: 83 ML/MIN/1.73
EOSINOPHIL # BLD AUTO: 0.1 X10E3/UL (ref 0–0.4)
EOSINOPHIL NFR BLD AUTO: 2 %
ERYTHROCYTE [DISTWIDTH] IN BLOOD BY AUTOMATED COUNT: 11.8 % (ref 11.7–15.4)
FOLATE SERPL-MCNC: >20 NG/ML
GLOBULIN SER CALC-MCNC: 2.5 G/DL (ref 1.5–4.5)
GLUCOSE SERPL-MCNC: 86 MG/DL (ref 70–99)
HCT VFR BLD AUTO: 35.1 % (ref 34–46.6)
HDLC SERPL-MCNC: 41 MG/DL
HGB BLD-MCNC: 11.6 G/DL (ref 11.1–15.9)
IMM GRANULOCYTES # BLD AUTO: 0 X10E3/UL (ref 0–0.1)
IMM GRANULOCYTES NFR BLD AUTO: 0 %
LDLC SERPL CALC-MCNC: 102 MG/DL (ref 0–99)
LDLC/HDLC SERPL: 2.5 RATIO (ref 0–3.2)
LYMPHOCYTES # BLD AUTO: 1.8 X10E3/UL (ref 0.7–3.1)
LYMPHOCYTES NFR BLD AUTO: 27 %
MCH RBC QN AUTO: 29.7 PG (ref 26.6–33)
MCHC RBC AUTO-ENTMCNC: 33 G/DL (ref 31.5–35.7)
MCV RBC AUTO: 90 FL (ref 79–97)
MONOCYTES # BLD AUTO: 0.4 X10E3/UL (ref 0.1–0.9)
MONOCYTES NFR BLD AUTO: 6 %
NEUTROPHILS # BLD AUTO: 4.3 X10E3/UL (ref 1.4–7)
NEUTROPHILS NFR BLD AUTO: 64 %
PLATELET # BLD AUTO: 241 X10E3/UL (ref 150–450)
POTASSIUM SERPL-SCNC: 4.3 MMOL/L (ref 3.5–5.2)
PROT SERPL-MCNC: 6.6 G/DL (ref 6–8.5)
RBC # BLD AUTO: 3.9 X10E6/UL (ref 3.77–5.28)
SODIUM SERPL-SCNC: 140 MMOL/L (ref 134–144)
TRIGL SERPL-MCNC: 258 MG/DL (ref 0–149)
TSH SERPL DL<=0.005 MIU/L-ACNC: 3.13 UIU/ML (ref 0.45–4.5)
VIT B12 SERPL-MCNC: >2000 PG/ML (ref 232–1245)
VLDLC SERPL CALC-MCNC: 44 MG/DL (ref 5–40)
WBC # BLD AUTO: 6.7 X10E3/UL (ref 3.4–10.8)

## 2023-12-07 NOTE — ASSESSMENT & PLAN NOTE
Try lower dose of Gabapentin and see if helps dizziness.   Check with pain management.  Increase intake of clear liquids.

## 2023-12-07 NOTE — ASSESSMENT & PLAN NOTE
Stable.  Continue Baclofen twice daily and Diclofenac 3 times daily.  Follow up as scheduled with rehab specialist and pain management.

## 2023-12-07 NOTE — ASSESSMENT & PLAN NOTE
Psychological condition is  stable .  Continue current treatment regimen.  Psychological condition  will be reassessed at the next regular appointment.  Continue Duloxetine twice daily.  Follow up as scheduled with psychiatry.

## 2023-12-07 NOTE — ASSESSMENT & PLAN NOTE
Patient's depression is recurrent and is moderate without psychosis. Their depression is currently in partial remission and the condition is  stable . This will be reassessed at the next regular appointment. F/U as described:patient will continue current medication therapy.  Continue Duloxetine twice daily.  Follow up as scheduled with psychiatry.

## 2023-12-07 NOTE — ASSESSMENT & PLAN NOTE
Change positions slowly.  Increase intake of clear liquids.  Schedule a follow up with cardiology.

## 2024-01-04 DIAGNOSIS — M79.641 PAIN IN RIGHT HAND: ICD-10-CM

## 2024-01-04 DIAGNOSIS — M62.89 MUSCLE HYPERTONICITY: ICD-10-CM

## 2024-01-04 DIAGNOSIS — G81.10 SPASTIC HEMIPLEGIA AFFECTING DOMINANT SIDE: ICD-10-CM

## 2024-01-19 NOTE — PATIENT INSTRUCTIONS
Continue to monitor your blood pressure periodically and record results.  Continue to work on healthy diet and exercise.  Follow up pending lab results.  Follow up in 6 months, or sooner if problems or concerns.      Medicare Wellness  Personal Prevention Plan of Service     Date of Office Visit:  2021  Encounter Provider:  YONG Garcia  Place of Service:  [facility]  Patient Name: Ania eRyes  :  1980    As part of the Medicare Wellness portion of your visit today, we are providing you with this personalized preventive plan of services (PPPS). This plan is based upon recommendations of the [place] Preventive Services Task Force (USPSTF) and the Advisory Committee on Immunization Practices (ACIP).    This lists the preventive care services that should be considered, and provides dates of when you are due. Items listed as completed are up-to-date and do not require any further intervention.    Health Maintenance   Topic Date Due   • HEPATITIS C SCREENING  2019   • ANNUAL WELLNESS VISIT  2021   • LIPID PANEL  2021   • PAP SMEAR  2023   • TDAP/TD VACCINES (3 - Td) 2030   • INFLUENZA VACCINE  Completed   • Pneumococcal Vaccine 0-64  Aged Out   • MENINGOCOCCAL VACCINE  Aged Out       Orders Placed This Encounter   Procedures   • Lipid Panel With LDL / HDL Ratio   • Comprehensive Metabolic Panel   • Hepatitis C Antibody       Return in about 6 months (around 2021) for Recheck.          
2022

## 2024-02-26 DIAGNOSIS — M79.641 PAIN IN RIGHT HAND: ICD-10-CM

## 2024-02-26 DIAGNOSIS — M62.89 MUSCLE HYPERTONICITY: ICD-10-CM

## 2024-02-26 DIAGNOSIS — G81.10 SPASTIC HEMIPLEGIA AFFECTING DOMINANT SIDE: ICD-10-CM

## 2024-02-26 RX ORDER — BACLOFEN 10 MG/1
TABLET ORAL
Qty: 180 TABLET | Refills: 0 | Status: SHIPPED | OUTPATIENT
Start: 2024-02-26

## 2024-02-26 NOTE — TELEPHONE ENCOUNTER
LOV             12/6/2023   NOV             3/11/2024   Last RF        11/29/23  Protocol       met    JEFFRY Saleh/LEXI

## 2024-03-11 ENCOUNTER — OFFICE VISIT (OUTPATIENT)
Dept: FAMILY MEDICINE CLINIC | Facility: CLINIC | Age: 44
End: 2024-03-11
Payer: MEDICARE

## 2024-03-11 VITALS
OXYGEN SATURATION: 97 % | WEIGHT: 156 LBS | SYSTOLIC BLOOD PRESSURE: 118 MMHG | HEART RATE: 93 BPM | TEMPERATURE: 97.9 F | BODY MASS INDEX: 23.11 KG/M2 | DIASTOLIC BLOOD PRESSURE: 78 MMHG | HEIGHT: 69 IN

## 2024-03-11 DIAGNOSIS — Z01.419 WELL WOMAN EXAM WITH ROUTINE GYNECOLOGICAL EXAM: Primary | ICD-10-CM

## 2024-03-11 DIAGNOSIS — F33.1 MODERATE EPISODE OF RECURRENT MAJOR DEPRESSIVE DISORDER: ICD-10-CM

## 2024-03-11 DIAGNOSIS — R42 DIZZINESS: ICD-10-CM

## 2024-03-11 DIAGNOSIS — R10.2 ADNEXAL TENDERNESS, LEFT: ICD-10-CM

## 2024-03-11 DIAGNOSIS — G81.10 SPASTIC HEMIPLEGIA AFFECTING DOMINANT SIDE: ICD-10-CM

## 2024-03-11 DIAGNOSIS — M79.641 PAIN IN RIGHT HAND: ICD-10-CM

## 2024-03-11 DIAGNOSIS — Z12.31 SCREENING MAMMOGRAM FOR BREAST CANCER: ICD-10-CM

## 2024-03-11 DIAGNOSIS — R06.89 DECREASED BREATH SOUNDS AT RIGHT LUNG BASE: ICD-10-CM

## 2024-03-11 DIAGNOSIS — Z98.890 HISTORY OF REPAIR OF MITRAL VALVE: ICD-10-CM

## 2024-03-11 DIAGNOSIS — F41.9 ANXIETY DISORDER, UNSPECIFIED TYPE: ICD-10-CM

## 2024-03-11 NOTE — PATIENT INSTRUCTIONS
Get chest x-ray at [facility].  Continue to monitor your blood pressure periodically and record results.  Continue to work on healthy diet and exercise.  Follow up pending lab/test results.  Follow up in 4 months for Medicare Wellness, or sooner if problems or concerns.

## 2024-03-11 NOTE — PROGRESS NOTES
Subjective   Ania Reyes is a 43 y.o. female.     Chief Complaint   Patient presents with    Annual Exam     Well woman exam with PAP       History of Present Illness   Patient presents for well woman exam with PAP; see well woman form; has regular menses; has a lot of menstrual cramping; menses lasts for 5 days; will have heavy flow for about 2 days and then slow down; will have to change super pad 4 times per day; no clots; takes Midol as needed for cramping and helps.    F/U S/P MVR: takes Metoprolol daily; does not monitor BP; no headaches; dizziness has improved with lower dose of Gabapentin--decreased Gabapentin to TID instead of QID; no more episodes of syncope; sees Dr. Mccurdy cardiology annually; has follow up in June.     F/U dyslipidemia: takes Atorvastatin daily; no myalgias.     F/U right hand pain/muscle hypertonicity: takes Baclofen twice daily and Diclofenac three times daily and works well; has been taking Baclofen in early morning and again around 1000 to prevent hand drawing up around 0930 and works well; also takes Gabapentin TID and works well; sees pain management; gets Botox injections in right hand and foot per Dr. Trammell at [facility] and helps, but does not last more than 1 month; has repeat injection on 3/13/24; will be trying an alternative medication; has not been wearing brace to stretch hand as recommended per PT like should.     F/U seizures/spastic hemiplegia/history of stroke: takes Keppra twice daily; sees neurology, goes to Epilepsy clinic; no seizures since has been on medication.     F/U depression/anxiety: takes Duloxetine twice daily; takes 60 mg at 1 p.m. and 30 mg at bedtime and works well; sees psychiatry annually at this point and counselor every 2 months.     F/U weight loss: weight has been stable; has been eating better; living with mom.     Mother was present during the history-taking and subsequent discussion with this patient.  Patient agrees to the presence  of the individual during this visit.      The following portions of the patient's history were reviewed and updated as appropriate: allergies, current medications, past family history, past medical history, past social history, past surgical history and problem list.    Current Outpatient Medications on File Prior to Visit   Medication Sig    diclofenac (VOLTAREN) 50 MG EC tablet TAKE 1 TABLET BY MOUTH THREE TIMES DAILY AS NEEDED FOR PAIN (TAKE WITH FOOD)    DULoxetine (CYMBALTA) 30 MG capsule Take 2 capsules by mouth Every Morning AND 1 capsule Every Night. Indications: Generalized Anxiety Disorder, Major Depressive Disorder    famotidine (PEPCID) 20 MG tablet Take 1 tablet by mouth Daily.    gabapentin (NEURONTIN) 600 MG tablet Take 1 tablet by mouth 3 (Three) Times a Day.    levETIRAcetam (KEPPRA) 500 MG tablet Take 1 tablet by mouth 2 (Two) Times a Day.    metoprolol succinate XL (TOPROL-XL) 25 MG 24 hr tablet Take 1 tablet by mouth Daily.    Multiple Vitamins-Minerals (MULTIVITAMIN ADULTS PO) Take 1 tablet by mouth Daily.    potassium chloride (K-DUR) 10 MEQ CR tablet Take 1 tablet by mouth Daily.    VITAMIN D, CHOLECALCIFEROL, PO Take 1 tablet by mouth Daily.    aspirin 81 MG tablet Take 1 tablet by mouth Daily.    atorvastatin (LIPITOR) 20 MG tablet Take 1 tablet by mouth Daily.    baclofen (LIORESAL) 10 MG tablet TAKE 1 TABLET BY MOUTH TWICE DAILY AS NEEDED FOR MUSCLE SPASMS    diazePAM (DIASTAT ACUDIAL) 20 MG rectal kit INSERT 12.5MG INTO THE RECTUM ONCE FOR 1 DOSE FOR GENERALIZED SEIZURE LASTING OVER 3 MINUTES.     No current facility-administered medications on file prior to visit.        Past Medical History:   Diagnosis Date    Acute bilateral low back pain without sciatica 07/26/2022    Allergic rhinitis     Aphasia     BMI 22.0-22.9, adult     Burning with urination 12/07/2020    Cerebral infarction due to embolism of left middle cerebral artery     Depression with anxiety     Dyslipidemia      Embolism of left middle cerebral artery 05/28/2015    Encounter for immunization     Fatigue     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 05/11/2015    Description: 02- - (N) Coronaries - LVEF 55% - Severe prolapse of the anterior & posterior mitral leaflet with severe MR,    Functional cardiac murmur 04/14/2014    Gingival enlargement 11/17/2016    Gum hypertrophy     Heart failure 08/13/2021    NYHA class 3 NYHA class 3 NYHA class 3 NYHA class 3    High risk medication use     History of acute sinusitis     History of dizziness     History of echocardiogram     History of hyperparathyroidism     History of kidney stones     Bilateral    History of low back pain     History of mitral valve repair     [employer] annuloplasty ring, complex MV repair with significant intra-op and post op complications    History of nephrolithiasis     History of stroke     LMCA; right hemiplegia    History of transesophageal echocardiography (PRASHANT)     Hydroureteronephrosis 12/21/2020 12/21/20 CT abd/pelvis: 4-5 mm obstructing stone located in the distal left ureter with mild-moderate left hydroureteronephrosis; tiny nonobstructing stones elsewhere in both kidneys    Kidney stone     3mm     Lactase deficiency     Left flank pain 12/07/2020    Left nephrolithiasis 08/12/2019    Methicillin resistant Staphylococcus aureus infection 03/13/2015    No A2K system hx - now +MRSA sputum on 3/13/2015    Mitral regurgitation     Mitral valve prolapse     Muscle hypertonicity     Need for SBE (subacute bacterial endocarditis) prophylaxis 08/12/2019    Osteopenia     Pain in right hand     Pain, wrist joint     Parathyroid adenoma     Positive depression screening     Primary hyperparathyroidism     Right arm pain     Right hemiplegia 05/28/2015    Seizures     Sinus tachycardia     Spastic hemiplegia affecting dominant side     Stroke 05/2015    Vitamin D deficiency        Past Surgical History:    Procedure Laterality Date    CARDIAC SURGERY      CYSTOSCOPY URETEROSCOPY LASER LITHOTRIPSY      KNEE SURGERY Left     MITRAL VALVE REPAIR/REPLACEMENT  2015    annuloplasty ring, complex MV repair with significant intra-op and post-op complications    OTHER SURGICAL HISTORY      Selective Arterial Catheterization     PARATHYROID GLAND SURGERY  2014    Parathyroid resection       Family History   Problem Relation Age of Onset    Depression Mother     Anxiety disorder Mother     Coronary artery disease Mother     Glaucoma Mother     Nephrolithiasis Mother     Heart disease Mother     No Known Problems Father     Anxiety disorder Sister     Colon cancer Maternal Grandmother         diagnosed in her 60's    Breast cancer Maternal Grandmother     Ovarian cancer Maternal Grandmother     Coronary artery disease Maternal Grandfather     Heart attack Maternal Grandfather          at age 54 years    Breast cancer Maternal Aunt         diagnosed in 40s    Ovarian cancer Maternal Aunt     Osteoporosis Maternal Aunt     No Known Problems Other     Colon cancer Maternal Great-Grandfather        Social History     Socioeconomic History    Marital status:    Tobacco Use    Smoking status: Never    Smokeless tobacco: Never   Vaping Use    Vaping status: Never Used   Substance and Sexual Activity    Alcohol use: Yes     Alcohol/week: 1.0 standard drink of alcohol     Types: 1 Drinks containing 0.5 oz of alcohol per week     Comment: social drinker    Drug use: Never    Sexual activity: Yes     Partners: Male     Birth control/protection: None       Review of Systems   Constitutional:  Negative for appetite change, chills, fatigue, fever, unexpected weight gain and unexpected weight loss.   HENT:  Negative for ear pain, sinus pressure, sore throat and trouble swallowing.    Eyes:  Negative for blurred vision.   Respiratory:  Negative for cough, chest tightness and shortness of breath.    Cardiovascular:   "Negative for chest pain and palpitations.   Gastrointestinal:  Negative for abdominal pain, blood in stool, constipation, diarrhea, GERD and indigestion.   Endocrine: Negative for polydipsia.   Genitourinary:  Negative for dysuria, frequency and vaginal discharge.   Musculoskeletal:  Negative for back pain. Arthralgias: see HPI.  Skin:  Negative for rash.   Neurological:  Negative for dizziness and headache.       Objective   Vitals:    03/11/24 1358   BP: 118/78   BP Location: Left arm   Patient Position: Sitting   Cuff Size: Adult   Pulse: 93   Temp: 97.9 °F (36.6 °C)   TempSrc: Temporal   SpO2: 97%   Weight: 70.8 kg (156 lb)   Height: 175.3 cm (69\")     Body mass index is 23.04 kg/m².    Physical Exam  Vitals and nursing note reviewed. Exam conducted with a chaperone present.   Constitutional:       General: She is not in acute distress.     Appearance: She is well-developed and well-groomed. She is not diaphoretic.   HENT:      Head: Normocephalic.      Right Ear: External ear normal.      Left Ear: External ear normal.   Eyes:      Conjunctiva/sclera: Conjunctivae normal.   Neck:      Vascular: No carotid bruit.   Cardiovascular:      Rate and Rhythm: Normal rate and regular rhythm.      Pulses: Normal pulses.   Pulmonary:      Effort: Pulmonary effort is normal. No respiratory distress.      Breath sounds: Examination of the right-lower field reveals decreased breath sounds. Decreased breath sounds present.   Chest:   Breasts:     Right: No mass, nipple discharge or skin change.      Left: No mass, nipple discharge or skin change.   Abdominal:      General: Bowel sounds are normal.      Palpations: Abdomen is soft. There is no hepatomegaly or splenomegaly.      Tenderness: There is no abdominal tenderness. There is no guarding.   Genitourinary:     Labia:         Right: No rash.         Left: No rash.       Vagina: Normal.      Cervix: Normal. No cervical motion tenderness or lesion.      Uterus: Normal.       " Adnexa:         Right: No fullness.          Left: Tenderness and fullness present.    Musculoskeletal:      Cervical back: Normal range of motion and neck supple.      Right lower leg: No edema.      Left lower leg: No edema.   Lymphadenopathy:      Upper Body:      Right upper body: No axillary adenopathy.      Left upper body: No axillary adenopathy.   Skin:     General: Skin is warm and dry.      Findings: No rash.   Neurological:      Mental Status: She is alert and oriented to person, place, and time.      Motor: Weakness: slight weakness in RUE compared to LUE.      Deep Tendon Reflexes:      Reflex Scores:       Patellar reflexes are 2+ on the right side and 2+ on the left side.  Psychiatric:         Mood and Affect: Mood normal.         Behavior: Behavior normal.         Thought Content: Thought content normal.         Judgment: Judgment normal.         Lab Results   Component Value Date    WBC 6.7 12/06/2023    RBC 3.90 12/06/2023    HGB 11.6 12/06/2023    HCT 35.1 12/06/2023    MCV 90 12/06/2023    MCH 29.7 12/06/2023    MCHC 33.0 12/06/2023    RDW 11.8 12/06/2023     12/06/2023    NEUTRORELPCT 64 12/06/2023    LYMPHORELPCT 27 12/06/2023    MONORELPCT 6 12/06/2023    EOSRELPCT 2 12/06/2023    BASORELPCT 1 12/06/2023    NEUTROABS 4.3 12/06/2023    LYMPHSABS 1.8 12/06/2023    MONOSABS 0.4 12/06/2023    EOSABS 0.1 12/06/2023    BASOSABS 0.1 12/06/2023    NRBC 0.0 12/11/2020     Lab Results   Component Value Date    GLUCOSE 86 12/06/2023    BUN 15 12/06/2023    CREATININE 0.89 12/06/2023    EGFRIFNONA 106 11/05/2021    EGFRIFAFRI 122 11/05/2021    BCR 17 12/06/2023    K 4.3 12/06/2023    CO2 25 12/06/2023    CALCIUM 9.4 12/06/2023    PROTENTOTREF 6.6 12/06/2023    ALBUMIN 4.1 12/06/2023    LABIL2 1.6 12/06/2023    AST 16 12/06/2023    ALT 22 12/06/2023      Lab Results   Component Value Date    CHLPL 187 12/06/2023    TRIG 258 (H) 12/06/2023    HDL 41 12/06/2023    VLDL 44 (H) 12/06/2023     (H)  12/06/2023     Lab Results   Component Value Date    TSH 3.130 12/06/2023           Assessment    Problem List Items Addressed This Visit       Spastic hemiplegia affecting dominant side    Current Assessment & Plan     Continue Baclofen twice daily.  Follow up as scheduled with pain management and rehab specialist.         Pain in right hand    Current Assessment & Plan     Stable. Continue Diclofenac TID and Baclofen twice daily.  Continue Gabapentin TID.  Follow up as scheduled with pain management.         History of repair of mitral valve    Overview     Description: [employer] annuloplasty ring, complex MV repair with significant intra-op and post-op complications         Current Assessment & Plan     Continue Metoprolol daily.  Follow up as scheduled with cardiology.         Dizziness    Current Assessment & Plan     Resolved with decreasing Gabapentin to TID instead of QID.         Anxiety disorder    Current Assessment & Plan     Psychological condition is  stable .  Continue current treatment regimen.  Psychological condition  will be reassessed in 6 months.  Continue Duloxetine twice daily.  Follow up as scheduled with psychiatry and counselor.         Moderate episode of recurrent major depressive disorder    Current Assessment & Plan     Patient's depression is a recurrent episode that is moderate without psychosis. Depression is active and stable.    Plan:   Continue current medication therapy   Continue Duloxetine twice daily.  Follow up as scheduled with psychiatry and counselor.  Followup in 6 months.          Decreased breath sounds at right lung base    Relevant Orders    XR Chest PA & Lateral    Adnexal tenderness, left    Relevant Orders    US Non-ob Transvaginal     Other Visit Diagnoses       Well woman exam with routine gynecological exam    -  Primary    Relevant Orders    IGP,Aptima HPV,Age Gdln    Screening mammogram for breast cancer        Relevant Orders    Mammo Screening  Digital Tomosynthesis Bilateral With CAD             Return in about 4 months (around 7/11/2024) for Medicare Wellness, Recheck.or sooner if problems or concerns.           Answers submitted by the patient for this visit:  Other (Submitted on 3/4/2024)  Please describe your symptoms.: [employer]  Have you had these symptoms before?: No  How long have you been having these symptoms?: 1-4 days  Primary Reason for Visit (Submitted on 3/4/2024)  What is the primary reason for your visit?: Other

## 2024-03-12 PROBLEM — R55 SYNCOPE: Status: RESOLVED | Noted: 2023-12-07 | Resolved: 2024-03-12

## 2024-03-12 PROBLEM — R06.89 DECREASED BREATH SOUNDS AT RIGHT LUNG BASE: Status: ACTIVE | Noted: 2024-03-12

## 2024-03-12 PROBLEM — R10.2 ADNEXAL TENDERNESS, LEFT: Status: ACTIVE | Noted: 2024-03-12

## 2024-03-12 NOTE — ASSESSMENT & PLAN NOTE
Stable. Continue Diclofenac TID and Baclofen twice daily.  Continue Gabapentin TID.  Follow up as scheduled with pain management.

## 2024-03-12 NOTE — ASSESSMENT & PLAN NOTE
Patient's depression is a recurrent episode that is moderate without psychosis. Depression is active and stable.    Plan:   Continue current medication therapy   Continue Duloxetine twice daily.  Follow up as scheduled with psychiatry and counselor.  Followup in 6 months.

## 2024-03-12 NOTE — ASSESSMENT & PLAN NOTE
Psychological condition is  stable .  Continue current treatment regimen.  Psychological condition  will be reassessed in 6 months.  Continue Duloxetine twice daily.  Follow up as scheduled with psychiatry and counselor.

## 2024-03-14 LAB
AGE GDLN ACOG TESTING: NORMAL
CYTOLOGIST CVX/VAG CYTO: NORMAL
CYTOLOGY CVX/VAG DOC CYTO: NORMAL
CYTOLOGY CVX/VAG DOC THIN PREP: NORMAL
DX ICD CODE: NORMAL
HPV GENOTYPE REFLEX: NORMAL
HPV I/H RISK 4 DNA CVX QL PROBE+SIG AMP: NEGATIVE
Lab: NORMAL
Lab: NORMAL
OTHER STN SPEC: NORMAL
STAT OF ADQ CVX/VAG CYTO-IMP: NORMAL

## 2024-04-03 DIAGNOSIS — G81.10 SPASTIC HEMIPLEGIA AFFECTING DOMINANT SIDE: ICD-10-CM

## 2024-04-03 DIAGNOSIS — M62.89 MUSCLE HYPERTONICITY: ICD-10-CM

## 2024-04-03 DIAGNOSIS — M79.641 PAIN IN RIGHT HAND: ICD-10-CM

## 2024-04-08 ENCOUNTER — HOSPITAL ENCOUNTER (OUTPATIENT)
Dept: MAMMOGRAPHY | Facility: HOSPITAL | Age: 44
Discharge: HOME | End: 2024-04-08
Admitting: NURSE PRACTITIONER
Payer: MEDICARE

## 2024-04-08 DIAGNOSIS — Z12.31 SCREENING MAMMOGRAM FOR BREAST CANCER: ICD-10-CM

## 2024-04-08 PROCEDURE — 77063 BREAST TOMOSYNTHESIS BI: CPT

## 2024-04-08 PROCEDURE — 77067 SCR MAMMO BI INCL CAD: CPT

## 2024-04-18 ENCOUNTER — HOSPITAL ENCOUNTER (OUTPATIENT)
Dept: GENERAL RADIOLOGY | Facility: HOSPITAL | Age: 44
Discharge: HOME | End: 2024-04-18
Payer: MEDICARE

## 2024-04-18 ENCOUNTER — HOSPITAL ENCOUNTER (OUTPATIENT)
Dept: ULTRASOUND IMAGING | Facility: HOSPITAL | Age: 44
Discharge: HOME | End: 2024-04-18
Payer: MEDICARE

## 2024-04-18 DIAGNOSIS — D25.9 UTERINE LEIOMYOMA, UNSPECIFIED LOCATION: Primary | ICD-10-CM

## 2024-04-18 DIAGNOSIS — R10.2 ADNEXAL TENDERNESS, LEFT: ICD-10-CM

## 2024-04-18 DIAGNOSIS — R06.89 DECREASED BREATH SOUNDS AT RIGHT LUNG BASE: ICD-10-CM

## 2024-04-18 PROCEDURE — 71046 X-RAY EXAM CHEST 2 VIEWS: CPT

## 2024-04-18 PROCEDURE — 76830 TRANSVAGINAL US NON-OB: CPT

## 2024-05-04 DIAGNOSIS — G81.10 SPASTIC HEMIPLEGIA AFFECTING DOMINANT SIDE: ICD-10-CM

## 2024-05-04 DIAGNOSIS — M79.641 PAIN IN RIGHT HAND: ICD-10-CM

## 2024-05-04 DIAGNOSIS — M62.89 MUSCLE HYPERTONICITY: ICD-10-CM

## 2024-05-21 ENCOUNTER — OFFICE VISIT (OUTPATIENT)
Dept: OBSTETRICS AND GYNECOLOGY | Age: 44
End: 2024-05-21
Payer: MEDICARE

## 2024-05-21 VITALS
SYSTOLIC BLOOD PRESSURE: 122 MMHG | WEIGHT: 156 LBS | BODY MASS INDEX: 23.11 KG/M2 | HEIGHT: 69 IN | DIASTOLIC BLOOD PRESSURE: 74 MMHG

## 2024-05-21 DIAGNOSIS — N92.0 MENORRHAGIA WITH REGULAR CYCLE: Primary | ICD-10-CM

## 2024-05-21 DIAGNOSIS — N94.6 DYSMENORRHEA: ICD-10-CM

## 2024-05-21 NOTE — PROGRESS NOTES
"Subjective     Chief Complaint   Patient presents with    Gynecologic Exam     New GYN, last pap 2024 neg/hpv neg, mg 2024, dexa 2019  Cc:Heavy menses, no appetite       Ania Reyes is a 43 y.o.  whose LMP is Patient's last menstrual period was 05/10/2024.     New GYN  UTD on annual exam and pap smear  Mammogram normal last month  She c/o of heavy, painful periods menses  Having monthly menses, lasting 5 days, heavy all 5 days and pretty severe cramping  She had US that was ordered by PCP. She has a 3 cm fibroid appeared to be subserosal. Ovaries were normal.  She has hx of stroke in   She is not SA  She has no other GYN concerns or complaints today      No Additional Complaints Reported    The following portions of the patient's history were reviewed and updated as appropriate:vital signs, allergies, current medications, past medical history, past social history, past surgical history, and problem list      Review of Systems   Pertinent items are noted in HPI.     Objective      /74   Ht 175.3 cm (69\")   Wt 70.8 kg (156 lb)   LMP 05/10/2024   BMI 23.04 kg/m²     Physical Exam    General:   alert and no distress   Heart: Not performed today   Lungs: Not performed today.   Breast: Not performed today   Neck: na   Abdomen: {Not performed today   CVA: Not performed today   Pelvis: Not performed today   Extremities: Not performed today   Neurologic: negative   Psychiatric: Normal affect, judgement, and mood       Lab Review   Labs: No data reviewed     Imaging   No data reviewed    Assessment & Plan     ASSESSMENT  1. Menorrhagia with regular cycle    2. Dysmenorrhea        PLAN  1. No orders of the defined types were placed in this encounter.      2. Medications prescribed this encounter:      No orders of the defined types were placed in this encounter.      3. Discussed options for menorrhagia/dysmenorrhea. She has hx of of CVA so recommend Mirena IUD. She is agreeable to " trying this and if no improvement patient wanting to proceed with hysterectomy.     Follow up: 3 week(s)    Rand Candelario, YONG  5/21/2024

## 2024-05-23 ENCOUNTER — PATIENT ROUNDING (BHMG ONLY) (OUTPATIENT)
Dept: OBSTETRICS AND GYNECOLOGY | Age: 44
End: 2024-05-23
Payer: MEDICARE

## 2024-05-23 DIAGNOSIS — G81.10 SPASTIC HEMIPLEGIA AFFECTING DOMINANT SIDE: ICD-10-CM

## 2024-05-23 DIAGNOSIS — M62.89 MUSCLE HYPERTONICITY: ICD-10-CM

## 2024-05-23 DIAGNOSIS — M79.641 PAIN IN RIGHT HAND: ICD-10-CM

## 2024-05-23 RX ORDER — BACLOFEN 10 MG/1
TABLET ORAL
Qty: 180 TABLET | Refills: 0 | Status: SHIPPED | OUTPATIENT
Start: 2024-05-23

## 2024-05-23 NOTE — TELEPHONE ENCOUNTER
Rx Refill Note  Requested Prescriptions     Pending Prescriptions Disp Refills    baclofen (LIORESAL) 10 MG tablet [Pharmacy Med Name: BACLOFEN 10MG] 180 tablet 0     Sig: TAKE 1 TABLET BY MOUTH TWICE DAILY AS NEEDED FOR MUSCLE SPASMS      Last office visit with prescribing clinician: 3/11/2024   Last telemedicine visit with prescribing clinician: Visit date not found   Next office visit with prescribing clinician: 7/15/2024                         Would you like a call back once the refill request has been completed: [] Yes [] No    If the office needs to give you a call back, can they leave a voicemail: [] Yes [] No    Rosalee Martinez MA  05/23/24, 15:36 EDT

## 2024-05-23 NOTE — PROGRESS NOTES
A MY CHART MESSAGE HAS BEEN SENT TO THE PATIENT FOR [facility] [place] [facility].

## 2024-06-03 ENCOUNTER — TELEMEDICINE (OUTPATIENT)
Dept: BEHAVIORAL HEALTH | Facility: CLINIC | Age: 44
End: 2024-06-03
Payer: MEDICARE

## 2024-06-03 DIAGNOSIS — Z79.899 MEDICATION MANAGEMENT: ICD-10-CM

## 2024-06-03 DIAGNOSIS — R40.0 DROWSINESS: ICD-10-CM

## 2024-06-03 DIAGNOSIS — F41.1 GENERALIZED ANXIETY DISORDER: ICD-10-CM

## 2024-06-03 DIAGNOSIS — F33.42 RECURRENT MAJOR DEPRESSIVE DISORDER, IN FULL REMISSION: Primary | ICD-10-CM

## 2024-06-03 PROCEDURE — 99214 OFFICE O/P EST MOD 30 MIN: CPT | Performed by: NURSE PRACTITIONER

## 2024-06-03 PROCEDURE — 1159F MED LIST DOCD IN RCRD: CPT | Performed by: NURSE PRACTITIONER

## 2024-06-03 PROCEDURE — 1160F RVW MEDS BY RX/DR IN RCRD: CPT | Performed by: NURSE PRACTITIONER

## 2024-06-03 RX ORDER — DULOXETIN HYDROCHLORIDE 30 MG/1
CAPSULE, DELAYED RELEASE ORAL
Qty: 270 CAPSULE | Refills: 3 | Status: SHIPPED | OUTPATIENT
Start: 2024-06-03 | End: 2025-06-03

## 2024-06-03 NOTE — PATIENT INSTRUCTIONS
"1. Should you want to get in touch with your provider, YONG Aranda, please contact MY Medical Assistant, Latonya, directly at [phone].  Recommend saving Latonya's direct number in phone as this is the PREFERRED & EASIEST way to get in contact with your provider.  Please leave a voice mail if you do not get an answer and she will return your call within 24 hrs. You will NOT be able to contact provider on [employer], as Behavioral Health Providers are restricted. YOU MUST CALL [phone]  If you need to speak with the on call provider after hours or on weekends, please Contact the [facility] ([phone]) and staff will be able to page the provider on call directly.     2.  In the event you need to cancel an appointment, please notify the office at least 24 hrs prior:   Contact **Latonya Medical Assistant at [facility] directly at [phone] or the [facility] ([phone])     3. MEDICATION REFILLS:  PLEASE CALL THE PHARMACY TO REQUEST ALL MEDICATION REFILLS or via [employer] TO ENSURE YOU ARE RECEIVING YOUR MEDICATIONS IN A TIMELY MANNER. The pharmacy or [employer] will send this request ELECTRONICALLY to the ordering provider.   IF YOU USE AN AUTOMATED SERVICE AT THE PHARMACY FOR REFILLS AND ARE TOLD THERE ARE \"NO REFILLS REMAINING\"   PLEASE CALL THE PHARMACY & SPEAK TO A LIVE PERSON TO VERIFY IT IS THE MOST UP TO DATE PRESCRIPTION ON FILE.    All new prescriptions will have a different number, therefore, if you were given refills for a medication today or at last visit it will not have the same number as the previous prescription.     4.  In the event you have personal crisis, contact the following crisis numbers: Suicide Prevention Hotline 1-216.155.9093 or *988, [facility] Helpline 2-792-152-BDFH; [facility] Emergency Room [phone]; text HELLO to 073822; or 911.  If you feel like harming yourself or others, call 911 right away.  You can call the 983 Suicide and Crisis " "Lifeline at  988   to speak with a counselor at the [facility], or you can connect with one using their online chat  .    5.  Never stop an antidepressant medicine without first talking to a healthcare provider. Suddenly stopping this type of medication can cause withdrawal symptoms.    6.  Counseling and talk therapy  Counseling or therapy teaches you new coping skills and more adaptive ways of thinking about problems. These tools can help you make positive changes. The benefits of counseling often last long after treatment sessions have stopped.    7.   We would appreciate your feedback, please scan the QRS code on the back of your appointment card (or see below) and complete a brief survey.  [place] location is still not available, so please click \"[place]\" location.  Thank you      SPECIFIC RECOMMENDATIONS:     1.      Medications discussed at this encounter:                   -  Refilled Cymbalta today    2.      Psychotherapy recommendations: Declined     3.     Return to clinic: 1 yr. Monday 6/2/25 at 2 pm via video    Please arrive at least 15 minutes before your scheduled appointment time to complete check in process.      IF you are scheduled for a [employer] VIDEO visit, YOU MUST COMPLETE THE \"E-CHECK IN\" PROCESS PRIOR TO BEGINNING THE VISIT, YOU WILL NO LONGER RECEIVE A PHONE CALL PRIOR TO ALL VIDEO VISITS; You may still complete the E-Check in for in office visits prior to appointment, you will receive multiple text/email reminders which will direct you further if needed.           "

## 2024-06-03 NOTE — PROGRESS NOTES
This provider is located at [address] [place] [address], [address], [place] [address]. The Patient is seen remotely using [employer]. Patient is being seen via telehealth and confirm that they are in a secure environment for this session. The patient's condition being diagnosed/treated is appropriate for telemedicine. The provider identified himself/herself: herself as well as her credentials.   The patient and mother gave consent to be seen remotely, and when consent is given they understand that the consent allows for patient identifiable information to be sent to a third party as needed.   They may refuse to be seen remotely at any time. The electronic data is encrypted and password protected, and the patient has been advised of the potential risks to privacy not withstanding such measures.    You have chosen to receive care through a telehealth visit.  Do you consent to use a video/audio connection for your medical care today? Yes    Subjective   Ania Reyes is a 43 y.o. female who presents today for follow up      Referring Provider:  No referring provider defined for this encounter.    Chief Complaint:   Depression, anxiety, fatigue, medication management     Answers submitted by the patient for this visit:  Other (Submitted on 6/1/2024)  Please describe your symptoms.: Good  Have you had these symptoms before?: No  How long have you been having these symptoms?: 1-4 days  Primary Reason for Visit (Submitted on 6/1/2024)  What is the primary reason for your visit?: Other    History of Present Illness:    6/3/24:  Patient presents today via [employer] Video visit from home with mother, Haydee, whom will be assisting with communication during visit due to patient aphasia, located in [place], [place].   Patient continues to do well with Cymbalta 60 mg at 1 pm and 30 mg at bedtime.  Patient was taking 4 of the 600 mg Gabapentin per day and had to reduce to 3 per day due to dizziness, which has improved since dose.  "  Patient continues to experience day time fatigue, mom reports she has to push her to get out of the house. Patient is sleeping through the night, though taking naps throughout the day.  If mother and patient are gone out of the house patient is fine and does not fall asleep, otherwise will sleep on and off all day.  Patient is also taking several other medications that can cause drowsiness. Mother says, \"I just have to keep her busy.\"  Mother is planning on doing some site seeing around [place] soon.    Overall mood has been stable, denies symptoms of anxiety or depression.       6/2/23:  Patient presents today via [employer] Video visit from home, located in [place], [place].  Mother is present with patient new dog, \"Bandit\".  Patient continues to take Cymbalta 60 mg in the morning and 30 mg at night, which is tolerating well.  Patient mom has been putting her to work cutting the yard, and they both do yard work, and \"botox doctor is really impressed, said that was good for her.\" As they have been using a push mower due to riding mower breaking.  Patient reports weight of 155 lbs and appetite has improved.  Overall mood improved, more active, tolerating Cymbalta well without reported side effects.     Depression: Patient complains of depression. She complains of fatigue and insomnia  Denies symptoms of anxiety.      12/2/22:  Patient presents today via [employer] Video visit from home with mother Haydee, patient reports now living with mother in [place], [place].  Patient mother assisting with communication, and patient moved back approximately 1 month ago. Mother's dog has had puppies and had told patient she had to come to stay with mother to get a puppy.     Patient reports she is doing good, in regards to appetite and weight, mother confirms appetite has improved due to mother cooking and patient has started playing cards with group of friends at house, which patient enjoys.  On Mondays, they have a girl's day out and " "patient attends. \"I don't let her sit too much\".  Patient mother does not have a scale, and will have weight obtained next Tues at PCP appointment.      Patient has had increased activity as mother has patient doing several household chores, helping with puppies which is helpful and patient enjoys.        9/2/22:  Patient presents today via [employer] Video visit from outside of [facility] in [place] with sister Bianca. Patient reports move to [place] went well as patient is now living with sister and sister's son.      Patient reports current medication, Cymbalta has been effective in management of both depression and anxiety.  Denies sleep problems, nor side effects.   Rates self a 4 out 10 for depression, and 4 out of 10 for anxiety.    Denies seizure activity.   Per sister patient is \"upbeat about things, I have to stay on her about eating\". Weight gain of 5 lb since last visit.        6/2/22: Patient presents today in office with sister, Nicolette.   Patient has not eaten for 7 days, however, sister was able to get patient to eat last night and today.  Patient was out of Gabapentin which caused nausea, was able to  medication yesterday.  Apparently there was a discrepancy with the date and pharmacy. Patient will be moving back to [place] on Monday with sister, Bianca.  Patient has noticed clothes are loose, weight trend reviewed.  Due to heart condition patient is not to weigh more than 140 lbs.     Since patient began splitting dose of Cymbalta to 60 mg every morning and 30 mg every night patient no longer sleeping all day,  Patient feels overall mood has improved.  Sister has noticed positive changes, as patient interacts more, no longer just nodding head, now engaging more with others, and laughing.     Patient will be living off [facility] And wishes to do video visits due to length of drive.      5/5/22: Patient presents today in office with mother, Haydee.  Patient reports since " "increase of Cymbalta had 2 weeks of being awake all day then had 2 weeks of sleeping all day.  \"she seems to want to sleep all the time.\"  \"today I am tired.\" Mother reports this past Monday patient slept all day, and woke up an hour before bedtime, and went back to sleep.     Patient has moved in with mother completely, sister is trying to find a house and patient plans to move in with sister.  Patient likes living with mother though does not like the country.  Patient plans to move in with other sister, Bianca, which will be in [place].   Patient plans to move on 5/31/22.     Patient declines therapy due to aphasia.  Patient feels mood has improved though difficult to determine due to increased sleep pattern.      3/31/22: INITIAL EVAL  Patient presents today in office with sister, Nicolette Johnson, with a history of depression and anxiety.  Patient suffered a stroke 2015, first seizure occured 6 yrs after stroke in July or August of 2021, and 4 weeks later had another seizure.  Started on Keppra 9/2021. Currently is followed at Epilepsy clinic and by neurologist.  Patient with right hemiplegia, rle weakness, stability problems occasionally,  aphasia, and limited fine motor to right hand.     Patient dog, Rama, passed away 1 month ago, 2/27/22, from cancer at 5 yrs old.  Patient has been having difficulty coping, as patient did not eat for the first 4 days after, and this past Monday and Tues patient went without eating. Minimal oral intake, drinking big red more than water.  Patient admits to clothes not fitting as well since food deprivation.   Patient admits to anhedonia, crying often, having difficulty getting out of bed, increased desire to sleep, and lacking motivation.     Stressors: [place], spouse who passed away 2 yrs ago, dog passing away, and losing home. Patient sister reports patient was a victim of cat fishing and now house has to be sold as patient sent out [phone] to a boyfriend online.  " Patient reports being with boyfriend for 1 yr and 6 months, he was in the , all communication was through online social media, no telephone or video communication.     Patient appeared to do well with loss of , cat fishing episode, but when Rama was put down patient shut down.      Patient will be moving to [place] with mother in approximately 1 month, house has been sold, and all belongings must be out by April 27.  Sister lives in [place], [place]. And assists with medical care and communication.      Before stroke patient only took vitamin d, has been on Cymbalta since after stroke for pain with nerves to right hand/arm prescribed by neurologist takes in the morning currently. Uncertain of start date, though, sister confirms for several years.           PHQ-9 Depression Screening  PHQ-9 Total Score:  6/1/2024 5 (PHQ2-0)    Little interest or pleasure in doing things? (P) 0-->not at all   Feeling down, depressed, or hopeless? (P) 0-->not at all   Trouble falling or staying asleep, or sleeping too much? (P) 3-->nearly every day   Feeling tired or having little energy? (P) 2-->more than half the days   Poor appetite or overeating? (P) 0-->not at all   Feeling bad about yourself - or that you are a failure or have let yourself or your family down? (P) 0-->not at all   Trouble concentrating on things, such as reading the newspaper or watching television? (P) 0-->not at all   Moving or speaking so slowly that other people could have noticed? Or the opposite - being so fidgety or restless that you have been moving around a lot more than usual? (P) 0-->not at all   Thoughts that you would be better off dead, or of hurting yourself in some way? (P) 0-->not at all   PHQ-9 Total Score (P) 5     CALIN-7  Feeling nervous, anxious or on edge: (P) Not at all  Not being able to stop or control worrying: (P) Not at all  Worrying too much about different things: (P) Not at all  Trouble Relaxing: (P) Not at  all  Being so restless that it is hard to sit still: (P) Not at all  Feeling afraid as if something awful might happen: (P) Not at all  Becoming easily annoyed or irritable: (P) Not at all  CALIN 7 Total Score: (P) 0  If you checked any problems, how difficult have these problems made it for you to do your work, take care of things at home, or get along with other people: (P) Not difficult at all     Past Surgical History:  Past Surgical History:   Procedure Laterality Date    CARDIAC SURGERY  2015    CYSTOSCOPY URETEROSCOPY LASER LITHOTRIPSY      KNEE SURGERY Left     MITRAL VALVE REPAIR/REPLACEMENT  03/2015    annuloplasty ring, complex MV repair with significant intra-op and post-op complications    OTHER SURGICAL HISTORY      Selective Arterial Catheterization     PARATHYROID GLAND SURGERY  2014    Parathyroid resection       Problem List:  Patient Active Problem List   Diagnosis    Spastic hemiplegia affecting dominant side    Pain in right hand    Muscle hypertonicity    History of stroke    History of repair of mitral valve    High risk medication use    Dyslipidemia    Allergic rhinitis    Right hemiplegia    Osteopenia    Vitamin D deficiency    History of parathyroid surgery    Abnormality of gait following cerebrovascular accident (CVA)    Dizziness    Heart failure    Hypercalcemia    Methicillin resistant Staphylococcus aureus infection    Seizure    Anxiety disorder    Moderate episode of recurrent major depressive disorder    Pain disorder associated with psychological factors    Cold intolerance    Vitamin B12 deficiency    Decreased breath sounds at right lung base    Adnexal tenderness, left    Fibroid, uterine       Allergy:   No Known Allergies     Discontinued Medications:  Medications Discontinued During This Encounter   Medication Reason    DULoxetine (CYMBALTA) 30 MG capsule Reorder         Current Medications:   Current Outpatient Medications   Medication Sig Dispense Refill    DULoxetine  (CYMBALTA) 30 MG capsule Take 2 capsules by mouth Daily AND 1 capsule Every Night. Indications: Generalized Anxiety Disorder, Major Depressive Disorder 270 capsule 3    aspirin 81 MG tablet Take 1 tablet by mouth Daily.      atorvastatin (LIPITOR) 20 MG tablet Take 1 tablet by mouth Daily.      baclofen (LIORESAL) 10 MG tablet TAKE 1 TABLET BY MOUTH TWICE DAILY AS NEEDED FOR MUSCLE SPASMS 180 tablet 0    diazePAM (DIASTAT ACUDIAL) 20 MG rectal kit INSERT 12.5MG INTO THE RECTUM ONCE FOR 1 DOSE FOR GENERALIZED SEIZURE LASTING OVER 3 MINUTES.      diclofenac (VOLTAREN) 50 MG EC tablet TAKE 1 TABLET BY MOUTH THREE TIMES DAILY AS NEEDED FOR PAIN (TAKE WITH FOOD) 270 tablet 0    famotidine (PEPCID) 20 MG tablet Take 1 tablet by mouth Daily.      gabapentin (NEURONTIN) 600 MG tablet Take 1 tablet by mouth 3 (Three) Times a Day.  5    levETIRAcetam (KEPPRA) 500 MG tablet Take 1 tablet by mouth 2 (Two) Times a Day.      metoprolol succinate XL (TOPROL-XL) 25 MG 24 hr tablet Take 1 tablet by mouth Daily.  3    Multiple Vitamins-Minerals (MULTIVITAMIN ADULTS PO) Take 1 tablet by mouth Daily.      potassium chloride (K-DUR) 10 MEQ CR tablet Take 1 tablet by mouth Daily.      VITAMIN D, CHOLECALCIFEROL, PO Take 1 tablet by mouth Daily.       No current facility-administered medications for this visit.       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 05/11/2015    Description: 02- - (N) Coronaries - LVEF 55% - Severe prolapse of the anterior & posterior mitral leaflet with  severe MR,    Functional cardiac murmur 2014    Gingival enlargement 2016    Gum hypertrophy     Heart failure 2021    NYHA class 3 NYHA class 3 NYHA class 3 NYHA class 3    High risk medication use     History of acute sinusitis     History of dizziness     History of echocardiogram     History of hyperparathyroidism     History of kidney stones     Bilateral    History of low back pain     History of mitral valve repair     [employer] annuloplasty ring, complex MV repair with significant intra-op and post op complications    History of nephrolithiasis     History of stroke     LMCA; right hemiplegia    History of transesophageal echocardiography (PRASHANT)     Hydroureteronephrosis 2020 CT abd/pelvis: 4-5 mm obstructing stone located in the distal left ureter with mild-moderate left hydroureteronephrosis; tiny nonobstructing stones elsewhere in both kidneys    Kidney stone     3mm     Lactase deficiency     Left flank pain 2020    Left nephrolithiasis 2019    Methicillin resistant Staphylococcus aureus infection 2015    No A2K system hx - now +MRSA sputum on 3/13/2015    Mitral regurgitation     Mitral valve prolapse     Muscle hypertonicity     Need for SBE (subacute bacterial endocarditis) prophylaxis 2019    Osteopenia     Pain in right hand     Pain, wrist joint     Parathyroid adenoma     Positive depression screening     Primary hyperparathyroidism     Right arm pain     Right hemiplegia 2015    Seizures     Sinus tachycardia     Spastic hemiplegia affecting dominant side     Stroke 2015    Vitamin D deficiency         Past Psychiatric History:  Began Treatment: 3/31/22  Diagnoses:Depression and Anxiety  Psychiatrist:Denies  Therapist:Denies  Admission History:Denies  Medication Trials: 2015 after stroke for 6 months, unable to recal name  Self Harm: Denies  Suicide Attempts:Denies   Psychosis, Anxiety, Depression:   n/a    Substance Abuse History:   Types:Denies all, including illicit  Withdrawal Symptoms:Denies  Longest Period Sober:Not Applicable   AA: Not applicable     Social History: Updated 6/3/24  Martial Status:Single    Employed:No disabled  Kids:No  House:Lives in a house with mother in [place], [place] since approximately early 2022    History: Denies  Access to Guns:  no    Social History     Socioeconomic History    Marital status:    Tobacco Use    Smoking status: Never    Smokeless tobacco: Never   Vaping Use    Vaping status: Never Used   Substance and Sexual Activity    Alcohol use: Yes     Alcohol/week: 1.0 standard drink of alcohol     Types: 1 Drinks containing 0.5 oz of alcohol per week     Comment: social drinker    Drug use: Never    Sexual activity: Not Currently     Partners: Male     Birth control/protection: None       Family History:   Suicide Attempts: Denies  Suicide Completions:Denies      Family History   Problem Relation Age of Onset    Depression Mother     Anxiety disorder Mother     Coronary artery disease Mother     Glaucoma Mother     Nephrolithiasis Mother     Heart disease Mother     No Known Problems Father     Anxiety disorder Sister     Colon cancer Maternal Grandmother         diagnosed in her 60's    Breast cancer Maternal Grandmother     Ovarian cancer Maternal Grandmother     Coronary artery disease Maternal Grandfather     Heart attack Maternal Grandfather          at age 54 years    Breast cancer Maternal Aunt         diagnosed in 40s    Ovarian cancer Maternal Aunt     Osteoporosis Maternal Aunt     No Known Problems Other     Colon cancer Maternal Great-Grandfather        Developmental History:   Born: [place]  Siblings:1 brother, 1 sister  Childhood: Denies Abuse  High School:Completed  College: almost completed nursing, due to stroke (1/2 yr left)    Mental Status Exam:   Hygiene:   good  Cooperation:  Cooperative  Eye Contact:  Good  Psychomotor  Behavior:  Appropriate  Affect:  Appropriate  Mood: euthymic   Speech:   Aphasic  Thought Process:  Goal directed  Thought Content:  Mood congruent  Suicidal:  None  Homicidal:  None  Hallucinations:  None  Delusion:  None  Memory:  Intact  Orientation:  Person, Place, Time and Situation  Reliability:  good  Insight:  Good  Judgement:  Good  Impulse Control:  Good  Physical/Medical Issues:  Yes Stroke with rt sided weakness, HLD, Sz 6 yrs post stroke, orthostatic hypotension, s/p MVR, spastic hemiplegia to rt dominant hand      Review of Systems:  Review of Systems   Constitutional:  Negative for appetite change, chills, diaphoresis, fatigue and fever.   HENT:  Negative for drooling, ear pain, postnasal drip, rhinorrhea and sore throat.    Eyes:  Negative for visual disturbance.   Respiratory:  Negative for cough, shortness of breath and wheezing.    Cardiovascular:  Negative for chest pain, palpitations and leg swelling.   Gastrointestinal:  Negative for nausea and vomiting.   Endocrine: Negative for cold intolerance and heat intolerance.   Genitourinary:  Negative for difficulty urinating.   Musculoskeletal:  Negative for joint swelling and myalgias.   Skin:  Negative for rash.   Allergic/Immunologic: Negative for immunocompromised state.   Neurological:  Positive for speech difficulty. Negative for dizziness, seizures, numbness and headaches.   Psychiatric/Behavioral:  Negative for decreased concentration, hallucinations, self-injury, sleep disturbance and suicidal ideas. The patient is not nervous/anxious.          Physical Exam:  Physical Exam  Psychiatric:         Attention and Perception: Attention and perception normal.         Mood and Affect: Mood and affect normal.         Speech: Speech is delayed.         Behavior: Behavior normal. Behavior is cooperative.         Thought Content: Thought content normal. Thought content does not include suicidal ideation. Thought content does not include suicidal plan.          Cognition and Memory: Cognition and memory normal.         Judgment: Judgment normal.      Comments: Aphasic secondary to stroke         Vital Signs:   There were no vitals taken for this visit.     Office notes from care team reviewed:  Date of Service: 5/21/24 OV with YONG Cruz with [facility]   Date of Service: 5/15/24 OV with  with [facility]   Date of Service: 3/11/24 OV with YONG Dangelo with [facility]    Lab Results: Reviewed  Office Visit on 03/11/2024   Component Date Value Ref Range Status    Age Gdln ACOG Testing 03/11/2024 30-65   Final    Diagnosis 03/11/2024 Comment   Final    NEGATIVE FOR INTRAEPITHELIAL LESION OR MALIGNANCY.    Specimen adequacy: 03/11/2024 Comment   Final    Comment: Satisfactory for evaluation.  Endocervical and/or squamous metaplastic  cells (endocervical component) are present.      Clinician Provided ICD-10: 03/11/2024 Comment   Final    Z01.419    Performed by: 03/11/2024 Comment   Final    Shu Tellez, Cytotechnologist (ASCP)    QC reviewed by: 03/11/2024 Comment   Final    Leandra Holliday, Supervisory Cytotechnologist (ASCP)    . 03/11/2024 .   Final    Note: 03/11/2024 Comment   Final    Comment: The Pap smear is a screening test designed to aid in the detection of  premalignant and malignant conditions of the uterine cervix.  It is not a  diagnostic procedure and should not be used as the sole means of detecting  cervical cancer.  Both false-positive and false-negative reports do occur.      Method: 03/11/2024 Comment   Final    Comment: This liquid based ThinPrep(R) pap test was screened with the  use of an image guided system.      HPV Aptima 03/11/2024 Negative  Negative Final    Comment: This nucleic acid amplification test detects fourteen high-risk  HPV types (16,18,31,33,35,39,45,51,52,56,58,59,66,68) without  differentiation.      HPV Genotype Reflex 03/11/2024 Comment   Final    Criteria not met, HPV Genotype  not performed.   Office Visit on 12/06/2023   Component Date Value Ref Range Status    Total Cholesterol 12/06/2023 187  100 - 199 mg/dL Final    Triglycerides 12/06/2023 258 (H)  0 - 149 mg/dL Final    HDL Cholesterol 12/06/2023 41  >39 mg/dL Final    VLDL Cholesterol Miles 12/06/2023 44 (H)  5 - 40 mg/dL Final    LDL Chol Calc ([facility]) 12/06/2023 102 (H)  0 - 99 mg/dL Final    LDL/HDL RATIO 12/06/2023 2.5  0.0 - 3.2 ratio Final    Comment:                                     LDL/HDL Ratio                                              Men  Women                                1/2 Avg.Risk  1.0    1.5                                    Avg.Risk  3.6    3.2                                 2X Avg.Risk  6.2    5.0                                 3X Avg.Risk  8.0    6.1      WBC 12/06/2023 6.7  3.4 - 10.8 x10E3/uL Final    RBC 12/06/2023 3.90  3.77 - 5.28 x10E6/uL Final    Hemoglobin 12/06/2023 11.6  11.1 - 15.9 g/dL Final    Hematocrit 12/06/2023 35.1  34.0 - 46.6 % Final    MCV 12/06/2023 90  79 - 97 fL Final    MCH 12/06/2023 29.7  26.6 - 33.0 pg Final    MCHC 12/06/2023 33.0  31.5 - 35.7 g/dL Final    RDW 12/06/2023 11.8  11.7 - 15.4 % Final    Platelets 12/06/2023 241  150 - 450 x10E3/uL Final    Neutrophil Rel % 12/06/2023 64  Not Estab. % Final    Lymphocyte Rel % 12/06/2023 27  Not Estab. % Final    Monocyte Rel % 12/06/2023 6  Not Estab. % Final    Eosinophil Rel % 12/06/2023 2  Not Estab. % Final    Basophil Rel % 12/06/2023 1  Not Estab. % Final    Neutrophils Absolute 12/06/2023 4.3  1.4 - 7.0 x10E3/uL Final    Lymphocytes Absolute 12/06/2023 1.8  0.7 - 3.1 x10E3/uL Final    Monocytes Absolute 12/06/2023 0.4  0.1 - 0.9 x10E3/uL Final    Eosinophils Absolute 12/06/2023 0.1  0.0 - 0.4 x10E3/uL Final    Basophils Absolute 12/06/2023 0.1  0.0 - 0.2 x10E3/uL Final    Immature Granulocyte Rel % 12/06/2023 0  Not Estab. % Final    Immature Grans Absolute 12/06/2023 0.0  0.0 - 0.1 x10E3/uL Final    Glucose 12/06/2023 86   70 - 99 mg/dL Final    BUN 12/06/2023 15  6 - 24 mg/dL Final    Creatinine 12/06/2023 0.89  0.57 - 1.00 mg/dL Final    EGFR Result 12/06/2023 83  >59 mL/min/1.73 Final    BUN/Creatinine Ratio 12/06/2023 17  9 - 23 Final    Sodium 12/06/2023 140  134 - 144 mmol/L Final    Potassium 12/06/2023 4.3  3.5 - 5.2 mmol/L Final    Chloride 12/06/2023 103  96 - 106 mmol/L Final    Total CO2 12/06/2023 25  20 - 29 mmol/L Final    Calcium 12/06/2023 9.4  8.7 - 10.2 mg/dL Final    Total Protein 12/06/2023 6.6  6.0 - 8.5 g/dL Final    Albumin 12/06/2023 4.1  3.9 - 4.9 g/dL Final    Globulin 12/06/2023 2.5  1.5 - 4.5 g/dL Final    A/G Ratio 12/06/2023 1.6  1.2 - 2.2 Final    Total Bilirubin 12/06/2023 0.2  0.0 - 1.2 mg/dL Final    Alkaline Phosphatase 12/06/2023 97  44 - 121 IU/L Final    AST (SGOT) 12/06/2023 16  0 - 40 IU/L Final    ALT (SGPT) 12/06/2023 22  0 - 32 IU/L Final    TSH 12/06/2023 3.130  0.450 - 4.500 uIU/mL Final    Vitamin B-12 12/06/2023 >2000 (H)  232 - 1245 pg/mL Final    Folate 12/06/2023 >20.0  >3.0 ng/mL Final    Comment: A serum folate concentration of less than 3.1 ng/mL is  considered to represent clinical deficiency.      25 Hydroxy, Vitamin D 12/06/2023 46.7  30.0 - 100.0 ng/mL Final    Comment: Vitamin D deficiency has been defined by the [place] [employer] and an Endocrine Society practice guideline as a  level of serum 25-OH vitamin D less than 20 ng/mL (1,2).  The Endocrine Society went on to further define vitamin D  insufficiency as a level between 21 and 29 ng/mL (2).  1. IOM ([place] [employer]). 2010. Dietary reference     intakes for calcium and D. [place]: The     National Academies Press.  2. Carole MF, Angle COOL, Nataliia LEMUS, et al.     Evaluation, treatment, and prevention of vitamin D     deficiency: an Endocrine Society clinical practice     guideline. JCEM. 2011 Jul; 96(7):1911-30.         EKG Results:  No orders to display       Imaging Results:  XR Chest PA &  Lateral    Result Date: 3/13/2022  No focal pulmonary consolidation. Follow-up as clinical indications persist.  This report was finalized on 3/13/2022 10:34 AM by Dr. Meliton Simental M.D.          Assessment & Plan   Diagnoses and all orders for this visit:    1. Recurrent major depressive disorder, in full remission (Primary)  -     DULoxetine (CYMBALTA) 30 MG capsule; Take 2 capsules by mouth Daily AND 1 capsule Every Night. Indications: Generalized Anxiety Disorder, Major Depressive Disorder  Dispense: 270 capsule; Refill: 3    2. Generalized anxiety disorder  -     DULoxetine (CYMBALTA) 30 MG capsule; Take 2 capsules by mouth Daily AND 1 capsule Every Night. Indications: Generalized Anxiety Disorder, Major Depressive Disorder  Dispense: 270 capsule; Refill: 3    3. Drowsiness    4. Medication management          Visit Diagnoses:    ICD-10-CM ICD-9-CM   1. Recurrent major depressive disorder, in full remission  F33.42 296.36   2. Generalized anxiety disorder  F41.1 300.02   3. Drowsiness  R40.0 780.09   4. Medication management  Z79.899 V58.69         PLAN:  Safety: No acute safety concerns  Therapy: Declines   Risk Assessment: Risk of self-harm acutely is low.  Risk factors include anxiety disorder, mood disorder, and recent psychosocial stressors (pandemic). Protective factors include no family history, denies access to guns/weapons, no present SI, no history of suicide attempts or self-harm in the past, minimal AODA, healthcare seeking, future orientation, willingness to engage in care.  Risk of self-harm chronically is also low, but could be further elevated in the event of treatment noncompliance and/or AODA.  Meds: Continue Cymbalta 60 mg by mouth daily every morning and 30 mg by mouth nightly to target depression with anxiety.  Refilled today for 90 days with 3 refills.    Labs: n/a    Symptoms of anxiety, depression are under good control with current medication regimen.  Suspected drowsiness is  related to several medications patient takes daily.  The plan was discussed with the patient and mother. The patient was given time to ask questions and these questions were answered. At the conclusion of their visit they had no additional questions or concerns and all questions were answered to their satisfaction.   Patient was given instructions and counseling regarding condition and for health maintenance advice. Please see specific information pulled into the AVS if appropriate.    Patient to contact provider if symptoms worsen or fail to improve.      6/2/23:   -Continue Cymbalta 60 mg by mouth daily every morning and 30 mg by mouth nightly to target depression with anxiety.  Refilled today  Symptoms of anxiety, depression are under good control with current medication regimen.  Overall mood is stable and in remission.   Patient was given instructions and counseling regarding condition and for health maintenance advice. Please see specific information pulled into the AVS if appropriate.    Patient to contact provider if symptoms worsen or fail to improve.      12/2/23:   Continue Cymbalta 60 mg by mouth daily every morning and 30 mg by mouth nightly to target chronic pain as previously ordered per neurologist and depression with anxiety.  Mother to contact new pharmacy in [place] for transfer of medications.   Overall mood improved, symptoms of anxiety and depression are under good control with Cymbalta.  New living arrangement with mother has been positive. Mother given direct contact number to [facility].   Patient to contact provider if symptoms worsen or fail to improve.       9/2/22:   Declines therapy  Continue Cymbalta 60 mg by mouth daily every morning and 30 mg by mouth nightly  to target chronic pain as previously ordered per neurologist and depression with anxiety.  Patient has adequate refills until early Oct. For which patient has been instructed to contact pharmacy for refill.  Updated  patient pharmacies   Patient doing well, mood improved, tolerating Cymbalta without side effects, continues to struggle with appetite, though noted 5 lb weight gain since last visit per EHR which noted weight of 139 7/25/22.  Will see patient back in 3 months or sooner if symptoms worsen or fail to improve.     6/2/22:   -Continue Cymbalta 60 mg by mouth daily every morning and 30 mg by mouth nightly  to target chronic pain as previously ordered per neurologist and depression with anxiety.     Patient doing well with current medication regimen, will be moving to [place] with sister soon.  Discussed option for telehealth video visits and patient agrees with plan, informed patient and sister of need to have an in person visit every 12 months per medicare guidelines.     5/5/22:   Change Cymbalta from 90 mg by mouth daily to 60 mg by mouth every morning and 30 mg by mouth nightly  to target chronic pain as previously ordered per neurologist and depression with anxiety.  Patient experiencing increased sedation with one total dose of 90 mg, will split doses. Overall mood improved with increase thus far.  Therapy-offered and encouraged today; patient to contact provider if later decides; would need one in [place] as patient will be relocating at the end of the month    3/31/22:   Increase Cymbalta from 60 mg to 90 mg by mouth daily in the morning to target chronic pain as previously ordered per neurologist and depression with anxiety. Discussed all risks, benefits, alternatives, and side effects of Duloxetine including but not limited to GI upset, sexual dysfunction, bleeding risk, seizure risk, weight loss, insomnia, diaphoresis, drowsiness, headache, dizziness, fatigue, activation of radha or hypomania, increased fragility fracture risk, hyponatremia, increased BP, hepatotoxicity, ocular effects, withdrawal syndrome following abrupt discontinuation, serotonin syndrome, and activation of suicidal ideation and  behavior. Pt educated on the need to practice safe sex while taking this med. Discussed the need for pt to immediately call the office for any new or worsening symptoms, such as worsening depression; feeling nervous or restless; suicidal thoughts or actions; or other changes changes in mood or behavior, and all other concerns. Pt educated on med compliance and the risks of suddenly stopping this medication or missing doses. Pt verbalized understanding and is agreeable to taking Duloxetine. Addressed all questions and concerns.  Will see patient back in 5 weeks as patient will be moving out of home into mother's home and must have all belongings out of home by 4/27/22.        Patient screened positive for depression based on a PHQ-9 score of 5 on 6/1/2024. Follow-up recommendations include: Prescribed antidepressant medication treatment and Suicide Risk Assessment performed. (PHQ2-0)       TREATMENT PLAN/GOALS: Continue supportive psychotherapy efforts and medications as indicated. Treatment and medication options discussed during today's visit. Patient acknowledged and verbally consented to continue with current treatment plan and was educated on the importance of compliance with treatment and follow-up appointments.    MEDICATION ISSUES:  LAMONTE reviewed as expected.  Discussed medication options and treatment plan of prescribed medication as well as the risks, benefits, and side effects including potential falls, possible impaired driving and metabolic adversities among others. Patient is agreeable to call the office with any worsening of symptoms or onset of side effects. Patient is agreeable to call 911 or go to the nearest ER should he/she begin having SI/HI. No medication side effects or related complaints today.     MEDS ORDERED DURING VISIT:  New Medications Ordered This Visit   Medications    DULoxetine (CYMBALTA) 30 MG capsule     Sig: Take 2 capsules by mouth Daily AND 1 capsule Every Night. Indications:  Generalized Anxiety Disorder, Major Depressive Disorder     Dispense:  270 capsule     Refill:  3       Return in about 1 year (around 6/3/2025) for Video visit, medication check.         I spent 31 minutes caring for Ania on this date of service. This time includes time spent by me in the following activities: preparing for the visit, reviewing tests, obtaining and/or reviewing a separately obtained history, performing a medically appropriate examination and/or evaluation, counseling and educating the patient/family/caregiver, ordering medications, tests, or procedures, referring and communicating with other health care professionals, documenting information in the medical record, care coordination, and scheduling .      This document has been electronically signed by YONG Aranda  Katie 3, 2024 14:17 EDT      Part of this note may be an electronic transcription/translation of spoken language to printed text using the Dragon Dictation System.

## 2024-07-13 NOTE — PROGRESS NOTES
The ABCs of the Annual Wellness Visit  Subsequent Medicare Wellness Visit    Subjective    Ania Reyes is a 43 y.o. female who presents for a Subsequent Medicare Wellness Visit.    The following portions of the patient's history were reviewed and   updated as appropriate: allergies, current medications, past family history, past medical history, past social history, past surgical history, and problem list.    Compared to one year ago, the patient feels her physical   health is the same.    Compared to one year ago, the patient feels her mental   health is the same.    Recent Hospitalizations:  She was not admitted to the hospital during the last year.       Current Medical Providers:  Patient Care Team:  Cinthia Mark APRN as PCP - General (Family Medicine)  Betty Mccurdy MD as Consulting Physician (Cardiology)  Amanda Escobar APRN (Nurse Practitioner)  Todd Davis MD as Consulting Physician (Urology)  Kellie Peterson APRN as Nurse Practitioner (Nurse Practitioner)  Renetta Ortega MD as Consulting Physician (Neurology)  Markie Trammell MD as Consulting Physician (Physical Medicine and Rehabilitation)  Kalyn Martin APRN as Nurse Practitioner (Behavioral Health)  Rand Candelario APRN as Nurse Practitioner (Obstetrics and Gynecology)    Outpatient Medications Prior to Visit   Medication Sig Dispense Refill    aspirin 81 MG tablet Take 1 tablet by mouth Daily.      atorvastatin (LIPITOR) 20 MG tablet Take 1 tablet by mouth Daily.      baclofen (LIORESAL) 10 MG tablet TAKE 1 TABLET BY MOUTH TWICE DAILY AS NEEDED FOR MUSCLE SPASMS 180 tablet 0    Cyanocobalamin (Vitamin B-12) 5000 MCG tablet dispersible Place 1 tablet on the tongue Every 30 (Thirty) Days.      diazePAM (DIASTAT ACUDIAL) 20 MG rectal kit INSERT 12.5MG INTO THE RECTUM ONCE FOR 1 DOSE FOR GENERALIZED SEIZURE LASTING OVER 3 MINUTES.      DULoxetine (CYMBALTA) 30 MG capsule Take 2 capsules by mouth Daily AND 1 capsule Every Night.  Indications: Generalized Anxiety Disorder, Major Depressive Disorder (Patient taking differently: 30 mg nightly in addition to 60 mg in morning) 270 capsule 3    DULoxetine (CYMBALTA) 60 MG capsule Take 1 capsule by mouth Every Morning.      famotidine (PEPCID) 20 MG tablet Take 1 tablet by mouth Daily.      gabapentin (NEURONTIN) 600 MG tablet Take 1 tablet by mouth 3 (Three) Times a Day.  5    levETIRAcetam (KEPPRA) 500 MG tablet Take 1 tablet by mouth 2 (Two) Times a Day.      metoprolol succinate XL (TOPROL-XL) 25 MG 24 hr tablet Take 1 tablet by mouth Daily.  3    Multiple Vitamins-Minerals (MULTIVITAMIN ADULTS PO) Take 1 tablet by mouth Daily.      potassium chloride (K-DUR) 10 MEQ CR tablet Take 1 tablet by mouth Daily.      VITAMIN D, CHOLECALCIFEROL, PO Take 1 tablet by mouth Daily.      diclofenac (VOLTAREN) 50 MG EC tablet TAKE 1 TABLET BY MOUTH THREE TIMES DAILY AS NEEDED FOR PAIN (TAKE WITH FOOD) 270 tablet 0     No facility-administered medications prior to visit.       No opioid medication identified on active medication list. I have reviewed chart for other potential  high risk medication/s and harmful drug interactions in the elderly.        Aspirin is on active medication list. Aspirin use is indicated based on review of current medical condition/s. Pros and cons of this therapy have been discussed today. Benefits of this medication outweigh potential harm.  Patient has been encouraged to continue taking this medication.  .      Patient Active Problem List   Diagnosis    Spastic hemiplegia affecting dominant side    Pain in right hand    Muscle hypertonicity    History of stroke    History of repair of mitral valve    High risk medication use    Dyslipidemia    Allergic rhinitis    Right hemiplegia    Osteopenia    Vitamin D deficiency    History of parathyroid surgery    Abnormality of gait following cerebrovascular accident (CVA)    Dizziness    Heart failure    Hypercalcemia    Methicillin  "resistant Staphylococcus aureus infection    Seizure    Anxiety disorder    Moderate episode of recurrent major depressive disorder    Pain disorder associated with psychological factors    Cold intolerance    Vitamin B12 deficiency    Decreased breath sounds at right lung base    Adnexal tenderness, left    Uterine fibroid    Chronic pain     Advance Care Planning   Advance Care Planning     Advance Directive is not on file.  ACP discussion was held with the patient during this visit. Patient does not have an advance directive, information provided.     Objective    Vitals:    07/15/24 1452   BP: 102/80   Pulse: 82   Temp: 98 °F (36.7 °C)   TempSrc: Infrared   SpO2: 98%   Weight: 71.2 kg (157 lb)   Height: 175.3 cm (69\")     Estimated body mass index is 23.18 kg/m² as calculated from the following:    Height as of this encounter: 175.3 cm (69\").    Weight as of this encounter: 71.2 kg (157 lb).    BMI is within normal parameters. No other follow-up for BMI required.      Does the patient have evidence of cognitive impairment? Yes as evidenced by mini-cog assessment, sees results; history of stroke, no change since stroke in past.    Lab Results   Component Value Date    CHLPL 219 (H) 07/15/2024    TRIG 432 (H) 07/15/2024    HDL 36 (L) 07/15/2024     (H) 07/15/2024    VLDL 74 (H) 07/15/2024        HEALTH RISK ASSESSMENT    Smoking Status:  Social History     Tobacco Use   Smoking Status Never    Passive exposure: Never   Smokeless Tobacco Never     Alcohol Consumption:  Social History     Substance and Sexual Activity   Alcohol Use Yes    Alcohol/week: 1.0 standard drink of alcohol    Types: 1 Drinks containing 0.5 oz of alcohol per week    Comment: social drinker     Fall Risk Screen:    STEADI Fall Risk Assessment was completed, and patient is at LOW risk for falls.Assessment completed on:7/15/2024    Depression Screenin/1/2024     1:22 PM   PHQ-2/PHQ-9 Depression Screening   Little Interest or " Pleasure in Doing Things 0-->not at all   Feeling Down, Depressed or Hopeless 0-->not at all   Trouble Falling or Staying Asleep, or Sleeping Too Much 3-->nearly every day   Feeling Tired or Having Little Energy 2-->more than half the days   Poor Appetite or Overeating 0-->not at all   Feeling Bad about Yourself - or that You are a Failure or Have Let Yourself or Your Family Down 0-->not at all   Trouble Concentrating on Things, Such as Reading the Newspaper or Watching Television 0-->not at all   Moving or Speaking So Slowly that Other People Could Have Noticed? Or the Opposite - Being So Fidgety 0-->not at all   Thoughts that You Would be Better Off Dead or of Hurting Yourself in Some Way 0-->not at all   PHQ-9: Brief Depression Severity Measure Score 5   If You Checked Off Any Problems, How Difficult Have These Problems Made It For You to Do Your Work, Take Care of Things at Home, or Get Along with Other People? not difficult at all   Little Interest or Pleasure in Doing Things 0-->not at all   Feeling Down, Depressed or Hopeless 0-->not at all   Trouble Falling or Staying Asleep, or Sleeping Too Much 3-->nearly every day   Feeling Tired or Having Little Energy 2-->more than half the days   Poor Appetite or Overeating 0-->not at all   Feeling Bad about Yourself - or that You are a Failure or Have Let Yourself or Your Family Down 0-->not at all   Trouble Concentrating on Things, Such as Reading the Newspaper or Watching Television 0-->not at all   Moving or Speaking So Slowly that Other People Could Have Noticed? Or the Opposite - Being So Fidgety 0-->not at all   Thoughts that You Would be Better Off Dead or of Hurting Yourself in Some Way 0-->not at all   If You Checked Off Any Problems, How Difficult Have These Problems Made It For You to Do Your Work, Take Care of Things at Home, or Get Along with Other People? not difficult at all       Health Habits and Functional and Cognitive Screenin/15/2024      2:59 PM   Functional & Cognitive Status   Do you have difficulty preparing food and eating? No   Do you have difficulty bathing yourself, getting dressed or grooming yourself? No   Do you have difficulty using the toilet? No   Do you have difficulty moving around from place to place? No   Do you have trouble with steps or getting out of a bed or a chair? No   Current Diet Well Balanced Diet   Dental Exam Up to date   Eye Exam Up to date   Exercise (times per week) 0 times per week   Current Exercises Include No Regular Exercise   Do you need help using the phone?  No   Are you deaf or do you have serious difficulty hearing?  No   Do you need help to go to places out of walking distance? No   Do you need help shopping? No   Do you need help preparing meals?  No   Do you need help with housework?  No   Do you need help with laundry? No   Do you need help taking your medications? No   Do you need help managing money? Yes   Do you ever drive or ride in a car without wearing a seat belt? No   Have you felt unusual stress, anger or loneliness in the last month? No   If you need help, do you have trouble finding someone available to you? No   Do you have difficulty concentrating, remembering or making decisions? Yes       Age-appropriate Screening Schedule:  Refer to the list below for future screening recommendations based on patient's age, sex and/or medical conditions. Orders for these recommended tests are listed in the plan section. The patient has been provided with a written plan.    Health Maintenance   Topic Date Due    COVID-19 Vaccine (4 - 2023-24 season) 09/01/2023    INFLUENZA VACCINE  08/01/2024    MAMMOGRAM  04/08/2025    ANNUAL WELLNESS VISIT  07/15/2025    LIPID PANEL  07/15/2025    DXA SCAN  06/27/2026    PAP SMEAR  03/11/2027    TDAP/TD VACCINES (3 - Td or Tdap) 12/07/2030    HEPATITIS C SCREENING  Completed    Pneumococcal Vaccine 0-64  Aged Out     History of stroke; lives with mother and mother helps  her manage money.  Some exercise mowing lawn.  Will consider COVID-19 vaccine booster.           CMS Preventative Services Quick Reference  Risk Factors Identified During Encounter  Immunizations Discussed/Encouraged: COVID19  The above risks/problems have been discussed with the patient.    Discussed low risk for falls and recommended avoiding throw rugs, installing grab bars in bathroom, making sure have handrails on steps, etc.    Pertinent information has been shared with the patient in the After Visit Summary.  An After Visit Summary and PPPS were made available to the patient.    Follow Up:   Next Medicare Wellness visit to be scheduled in 1 year.       Additional E&M Note during same encounter follows:  Patient has multiple medical problems which are significant and separately identifiable that require additional work above and beyond the Medicare Wellness Visit.      Chief Complaint  Medicare Wellness-subsequent    Subjective        HPI  Ania Reyes is also being seen today for follow up S/P MVR: takes Metoprolol daily; does not typically monitor BP; no headaches; no orthostasis; no swelling; sees Dr. Mccurdy cardiology annually; had follow up in June and recommended to increase exercise.     F/U dyslipidemia: takes Atorvastatin daily; no myalgias; avoids fried food, ice cream and cheese; non-fasting today.     F/U right hand pain/muscle hypertonicity: takes Baclofen twice daily and Diclofenac three times daily and working well; has been taking Baclofen in early morning and again around 1000 to prevent hand drawing up around 0930 and works well; also takes Gabapentin TID and helps; sees pain management; gets Botox injections in right hand and foot per Dr. Trammell at [facility] and helps foot more than right hand; does not last more than 1 month in right hand; has not been wearing brace to stretch hand as recommended per PT like should, but does stretches regularly.     F/U seizures/spastic  "hemiplegia/history of stroke: takes Keppra twice daily; sees neurology, had follow up in February; goes to Epilepsy clinic; no seizures since has been on medication.     F/U depression/anxiety: takes Duloxetine twice daily; takes 60 mg at 10:00 a.m. and 30 mg at bedtime and working well; sees psychiatry annually at this point and counselor once per year.    Saw GYN for uterine fibroid; discussed Mirena IUD for heavy menses; will consider hysterectomy if no improvement; current menses started today.      Mother was present during the history-taking and subsequent discussion with this patient.  Patient agrees to the presence of the individual during this visit.     Review of Systems   Constitutional:  Positive for fatigue. Negative for appetite change, chills and fever.   HENT:  Negative for ear pain, sinus pressure, sore throat and trouble swallowing.    Eyes:  Negative for discharge and visual disturbance.   Respiratory:  Negative for cough, chest tightness and shortness of breath.    Cardiovascular:  Negative for chest pain and palpitations.   Gastrointestinal:  Negative for abdominal pain, blood in stool, constipation and diarrhea.   Endocrine: Positive for cold intolerance. Negative for heat intolerance (some during menses) and polydipsia.   Genitourinary:  Negative for dysuria and frequency.   Musculoskeletal:  Back pain: some in left lower back since had lithotripsy in past; no radiation of pain down legs.   Skin:  Negative for rash.   Neurological:  Negative for syncope. Weakness: some in right hand since stroke.  Psychiatric/Behavioral:  Negative for sleep disturbance and suicidal ideas.        Objective   Vital Signs:  /80   Pulse 82   Temp 98 °F (36.7 °C) (Infrared)   Ht 175.3 cm (69\")   Wt 71.2 kg (157 lb)   SpO2 98%   BMI 23.18 kg/m²     Physical Exam  Vitals and nursing note reviewed.   Constitutional:       General: She is not in acute distress.     Appearance: She is well-developed and " well-groomed. She is not diaphoretic.   HENT:      Head: Normocephalic.      Jaw: No tenderness or pain on movement.      Right Ear: Tympanic membrane and external ear normal. No decreased hearing noted.      Left Ear: Tympanic membrane and external ear normal. No decreased hearing noted.      Nose: Nose normal.      Right Sinus: No maxillary sinus tenderness or frontal sinus tenderness.      Left Sinus: No maxillary sinus tenderness or frontal sinus tenderness.      Mouth/Throat:      Mouth: Mucous membranes are moist.      Pharynx: No oropharyngeal exudate or posterior oropharyngeal erythema.   Eyes:      Extraocular Movements: Extraocular movements intact.      Conjunctiva/sclera: Conjunctivae normal.      Pupils: Pupils are equal, round, and reactive to light.   Neck:      Thyroid: No thyromegaly.      Vascular: No carotid bruit.   Cardiovascular:      Rate and Rhythm: Normal rate and regular rhythm.      Pulses: Normal pulses.      Heart sounds: Normal heart sounds.   Pulmonary:      Effort: Pulmonary effort is normal. No respiratory distress.      Breath sounds: Normal breath sounds.   Abdominal:      General: Bowel sounds are normal.      Palpations: Abdomen is soft. There is no hepatomegaly or splenomegaly.      Tenderness: There is no abdominal tenderness. There is no guarding.   Musculoskeletal:      Cervical back: Normal range of motion and neck supple. No bony tenderness.      Thoracic back: No bony tenderness.      Lumbar back: No bony tenderness.      Right lower leg: No edema.      Left lower leg: No edema.   Lymphadenopathy:      Cervical: No cervical adenopathy.   Skin:     General: Skin is warm and dry.      Findings: No rash.   Neurological:      Mental Status: She is alert and oriented to person, place, and time.      Cranial Nerves: Cranial nerves 2-12 are intact.      Motor: Weakness: some in right hand compared to left.      Gait: Abnormal gait: guarded gait.      Deep Tendon Reflexes:       Reflex Scores:       Bicep reflexes are 2+ on the right side and 2+ on the left side.       Patellar reflexes are 2+ on the right side and 2+ on the left side.  Psychiatric:         Mood and Affect: Mood normal.         Behavior: Behavior normal.         Thought Content: Thought content normal.         Cognition and Memory: Cognition normal.         Judgment: Judgment normal.     12/6/23 CMP WNL; CBC WNL; lipid panel: , Trig 258, HDL 41,     Lab Results   Component Value Date    TSH 3.130 12/06/2023     Lab Results   Component Value Date    COLORU Yellow 07/25/2022    CLARITYU Clear 07/25/2022    LEUKOCYTESUR Negative 07/25/2022    BILIRUBINUR Negative 07/25/2022               Assessment and Plan   Diagnoses and all orders for this visit:    1. Encounter for Medicare annual wellness exam (Primary)  -     CBC & Differential  -     Lipid Panel With LDL / HDL Ratio  -     Comprehensive Metabolic Panel  -     Vitamin D,25-Hydroxy  -     Vitamin B12 & Folate    2. Vitamin D deficiency  -     Vitamin D,25-Hydroxy    3. Osteopenia, unspecified location  -     Vitamin D,25-Hydroxy    4. Dyslipidemia  Assessment & Plan:  Continue Atorvastatin daily.  Continue to work on healthy diet and exercise.    Orders:  -     Lipid Panel With LDL / HDL Ratio  -     Comprehensive Metabolic Panel    5. History of repair of mitral valve  Assessment & Plan:  Stable.  Continue Metoprolol daily.  Follow up as scheduled with cardiology.      6. Muscle hypertonicity  Assessment & Plan:  Continue Baclofen twice daily and Diclofenac TID.    Orders:  -     Comprehensive Metabolic Panel  -     diclofenac (VOLTAREN) 50 MG EC tablet; Take 1 tablet by mouth 3 (Three) Times a Day As Needed (hand pain).  Dispense: 270 tablet; Refill: 1    7. Vitamin B12 deficiency  -     Vitamin B12 & Folate    8. Spastic hemiplegia affecting dominant side  Assessment & Plan:  Continue stretching as per PT.    Orders:  -     diclofenac (VOLTAREN) 50 MG EC  tablet; Take 1 tablet by mouth 3 (Three) Times a Day As Needed (hand pain).  Dispense: 270 tablet; Refill: 1    9. Pain in right hand  Assessment & Plan:  Stable.  Continue Diclofenac TID as needed.  Continue Gabapentin TID as needed.  Follow up as scheduled with pain management.    Orders:  -     diclofenac (VOLTAREN) 50 MG EC tablet; Take 1 tablet by mouth 3 (Three) Times a Day As Needed (hand pain).  Dispense: 270 tablet; Refill: 1    10. Anxiety disorder, unspecified type  Assessment & Plan:  Psychological condition is stable.  Continue current treatment regimen.  Psychological condition  will be reassessed in 6 months.  Continue Duloxetine twice daily as per psychiatry.      11. Moderate episode of recurrent major depressive disorder  Assessment & Plan:  Patient's depression is a recurrent episode that is moderate without psychosis. Depression is active and stable.    Plan:   Continue current medication therapy   Continue Duloxetine twice daily as per psychiatry.  Followup in 6 months.       12. Seizure  Assessment & Plan:  Stable.  Continue Keppra twice daily.  Follow up as scheduled with neurology.      13. Uterine leiomyoma, unspecified location  Assessment & Plan:  Schedule appointment with GYN for IUD.               Follow Up   Return in about 6 months (around 1/15/2025) for Recheck.or sooner if problems or concerns.    Patient was given instructions and counseling regarding her condition or for health maintenance advice. Please see specific information pulled into the AVS if appropriate.

## 2024-07-15 ENCOUNTER — OFFICE VISIT (OUTPATIENT)
Dept: FAMILY MEDICINE CLINIC | Facility: CLINIC | Age: 44
End: 2024-07-15
Payer: MEDICARE

## 2024-07-15 VITALS
TEMPERATURE: 98 F | HEIGHT: 69 IN | BODY MASS INDEX: 23.25 KG/M2 | SYSTOLIC BLOOD PRESSURE: 102 MMHG | DIASTOLIC BLOOD PRESSURE: 80 MMHG | OXYGEN SATURATION: 98 % | HEART RATE: 82 BPM | WEIGHT: 157 LBS

## 2024-07-15 DIAGNOSIS — D25.9 UTERINE LEIOMYOMA, UNSPECIFIED LOCATION: ICD-10-CM

## 2024-07-15 DIAGNOSIS — E53.8 VITAMIN B12 DEFICIENCY: ICD-10-CM

## 2024-07-15 DIAGNOSIS — R56.9 SEIZURE: ICD-10-CM

## 2024-07-15 DIAGNOSIS — Z00.00 ENCOUNTER FOR MEDICARE ANNUAL WELLNESS EXAM: Primary | ICD-10-CM

## 2024-07-15 DIAGNOSIS — Z98.890 HISTORY OF REPAIR OF MITRAL VALVE: ICD-10-CM

## 2024-07-15 DIAGNOSIS — G81.10 SPASTIC HEMIPLEGIA AFFECTING DOMINANT SIDE: ICD-10-CM

## 2024-07-15 DIAGNOSIS — E55.9 VITAMIN D DEFICIENCY: ICD-10-CM

## 2024-07-15 DIAGNOSIS — F33.1 MODERATE EPISODE OF RECURRENT MAJOR DEPRESSIVE DISORDER: ICD-10-CM

## 2024-07-15 DIAGNOSIS — M79.641 PAIN IN RIGHT HAND: ICD-10-CM

## 2024-07-15 DIAGNOSIS — E78.5 DYSLIPIDEMIA: ICD-10-CM

## 2024-07-15 DIAGNOSIS — M85.80 OSTEOPENIA, UNSPECIFIED LOCATION: ICD-10-CM

## 2024-07-15 DIAGNOSIS — M62.89 MUSCLE HYPERTONICITY: ICD-10-CM

## 2024-07-15 DIAGNOSIS — F41.9 ANXIETY DISORDER, UNSPECIFIED TYPE: ICD-10-CM

## 2024-07-15 PROBLEM — G89.29 CHRONIC PAIN: Status: ACTIVE | Noted: 2024-05-16

## 2024-07-15 PROCEDURE — 99213 OFFICE O/P EST LOW 20 MIN: CPT | Performed by: NURSE PRACTITIONER

## 2024-07-15 PROCEDURE — 1159F MED LIST DOCD IN RCRD: CPT | Performed by: NURSE PRACTITIONER

## 2024-07-15 PROCEDURE — 1125F AMNT PAIN NOTED PAIN PRSNT: CPT | Performed by: NURSE PRACTITIONER

## 2024-07-15 PROCEDURE — 1160F RVW MEDS BY RX/DR IN RCRD: CPT | Performed by: NURSE PRACTITIONER

## 2024-07-15 PROCEDURE — G0439 PPPS, SUBSEQ VISIT: HCPCS | Performed by: NURSE PRACTITIONER

## 2024-07-15 RX ORDER — CYANOCOBALAMIN (VITAMIN B-12) 5000 MCG
5000 TABLET,DISINTEGRATING ORAL
COMMUNITY

## 2024-07-15 RX ORDER — DULOXETIN HYDROCHLORIDE 60 MG/1
60 CAPSULE, DELAYED RELEASE ORAL EVERY MORNING
COMMUNITY

## 2024-07-15 NOTE — PATIENT INSTRUCTIONS
Continue to monitor your blood pressure periodically and record results.  Continue to work on healthy diet and exercise.  Follow up pending lab results.  Follow up in 6 months, or sooner if problems or concerns.       Medicare Wellness  Personal Prevention Plan of Service     Date of Office Visit:    Encounter Provider:  YONG Jefferson  Place of Service:  [facility]  Patient Name: Ania Reyes  :  1980    As part of the Medicare Wellness portion of your visit today, we are providing you with this personalized preventive plan of services (PPPS). This plan is based upon recommendations of the [place] Preventive Services Task Force (USPSTF) and the Advisory Committee on Immunization Practices (ACIP).    This lists the preventive care services that should be considered, and provides dates of when you are due. Items listed as completed are up-to-date and do not require any further intervention.    Health Maintenance   Topic Date Due    COVID-19 Vaccine (2023- season) 2023    ANNUAL WELLNESS VISIT  2024    INFLUENZA VACCINE  2024    LIPID PANEL  2024    MAMMOGRAM  2025    DXA SCAN  2026    PAP SMEAR  2027    TDAP/TD VACCINES (3 - Td or Tdap) 2030    HEPATITIS C SCREENING  Completed    Pneumococcal Vaccine 0-64  Aged Out       Orders Placed This Encounter   Procedures    Lipid Panel With LDL / HDL Ratio     Order Specific Question:   Release to patient     Answer:   Routine Release [[id number]]    Comprehensive Metabolic Panel     Order Specific Question:   Release to patient     Answer:   Routine Release [[id number]]    Vitamin D,25-Hydroxy     Order Specific Question:   Release to patient     Answer:   Routine Release [[id number]]    Vitamin B12 & Folate     Order Specific Question:   Release to patient     Answer:   Routine Release [[id number]]    CBC & Differential     Order Specific Question:   Manual Differential      Answer:   No     Order Specific Question:   Release to patient     Answer:   Routine Release [[id number]]       Return in about 6 months (around 1/15/2025) for Recheck.

## 2024-07-16 LAB
25(OH)D3+25(OH)D2 SERPL-MCNC: 30 NG/ML (ref 30–100)
ALBUMIN SERPL-MCNC: 4.3 G/DL (ref 3.9–4.9)
ALP SERPL-CCNC: 98 IU/L (ref 44–121)
ALT SERPL-CCNC: 21 IU/L (ref 0–32)
AST SERPL-CCNC: 14 IU/L (ref 0–40)
BASOPHILS # BLD AUTO: 0.1 X10E3/UL (ref 0–0.2)
BASOPHILS NFR BLD AUTO: 1 %
BILIRUB SERPL-MCNC: <0.2 MG/DL (ref 0–1.2)
BUN SERPL-MCNC: 18 MG/DL (ref 6–24)
BUN/CREAT SERPL: 20 (ref 9–23)
CALCIUM SERPL-MCNC: 9.9 MG/DL (ref 8.7–10.2)
CHLORIDE SERPL-SCNC: 104 MMOL/L (ref 96–106)
CHOLEST SERPL-MCNC: 219 MG/DL (ref 100–199)
CO2 SERPL-SCNC: 25 MMOL/L (ref 20–29)
CREAT SERPL-MCNC: 0.9 MG/DL (ref 0.57–1)
EGFRCR SERPLBLD CKD-EPI 2021: 82 ML/MIN/1.73
EOSINOPHIL # BLD AUTO: 0.1 X10E3/UL (ref 0–0.4)
EOSINOPHIL NFR BLD AUTO: 1 %
ERYTHROCYTE [DISTWIDTH] IN BLOOD BY AUTOMATED COUNT: 11.9 % (ref 11.7–15.4)
FOLATE SERPL-MCNC: 18.7 NG/ML
GLOBULIN SER CALC-MCNC: 2.6 G/DL (ref 1.5–4.5)
GLUCOSE SERPL-MCNC: 84 MG/DL (ref 70–99)
HCT VFR BLD AUTO: 39.4 % (ref 34–46.6)
HDLC SERPL-MCNC: 36 MG/DL
HGB BLD-MCNC: 12.4 G/DL (ref 11.1–15.9)
IMM GRANULOCYTES # BLD AUTO: 0 X10E3/UL (ref 0–0.1)
IMM GRANULOCYTES NFR BLD AUTO: 0 %
LDLC SERPL CALC-MCNC: 109 MG/DL (ref 0–99)
LDLC/HDLC SERPL: 3 RATIO (ref 0–3.2)
LYMPHOCYTES # BLD AUTO: 1.7 X10E3/UL (ref 0.7–3.1)
LYMPHOCYTES NFR BLD AUTO: 24 %
MCH RBC QN AUTO: 29.4 PG (ref 26.6–33)
MCHC RBC AUTO-ENTMCNC: 31.5 G/DL (ref 31.5–35.7)
MCV RBC AUTO: 93 FL (ref 79–97)
MONOCYTES # BLD AUTO: 0.4 X10E3/UL (ref 0.1–0.9)
MONOCYTES NFR BLD AUTO: 6 %
NEUTROPHILS # BLD AUTO: 4.7 X10E3/UL (ref 1.4–7)
NEUTROPHILS NFR BLD AUTO: 68 %
PLATELET # BLD AUTO: 214 X10E3/UL (ref 150–450)
POTASSIUM SERPL-SCNC: 4.2 MMOL/L (ref 3.5–5.2)
PROT SERPL-MCNC: 6.9 G/DL (ref 6–8.5)
RBC # BLD AUTO: 4.22 X10E6/UL (ref 3.77–5.28)
SODIUM SERPL-SCNC: 142 MMOL/L (ref 134–144)
TRIGL SERPL-MCNC: 432 MG/DL (ref 0–149)
VIT B12 SERPL-MCNC: 890 PG/ML (ref 232–1245)
VLDLC SERPL CALC-MCNC: 74 MG/DL (ref 5–40)
WBC # BLD AUTO: 7 X10E3/UL (ref 3.4–10.8)

## 2024-07-17 DIAGNOSIS — E55.9 VITAMIN D DEFICIENCY: Primary | ICD-10-CM

## 2024-07-17 RX ORDER — FAMOTIDINE 20 MG
2000 TABLET ORAL DAILY
Start: 2024-07-17

## 2024-07-17 NOTE — ASSESSMENT & PLAN NOTE
Psychological condition is stable.  Continue current treatment regimen.  Psychological condition  will be reassessed in 6 months.  Continue Duloxetine twice daily as per psychiatry.

## 2024-07-17 NOTE — ASSESSMENT & PLAN NOTE
Patient's depression is a recurrent episode that is moderate without psychosis. Depression is active and stable.    Plan:   Continue current medication therapy   Continue Duloxetine twice daily as per psychiatry.  Followup in 6 months.

## 2024-07-17 NOTE — ASSESSMENT & PLAN NOTE
Stable.  Continue Diclofenac TID as needed.  Continue Gabapentin TID as needed.  Follow up as scheduled with pain management.

## 2024-08-19 DIAGNOSIS — M79.641 PAIN IN RIGHT HAND: ICD-10-CM

## 2024-08-19 DIAGNOSIS — G81.10 SPASTIC HEMIPLEGIA AFFECTING DOMINANT SIDE: ICD-10-CM

## 2024-08-19 DIAGNOSIS — M62.89 MUSCLE HYPERTONICITY: ICD-10-CM

## 2024-08-19 RX ORDER — BACLOFEN 10 MG/1
TABLET ORAL
Qty: 180 TABLET | Refills: 1 | Status: SHIPPED | OUTPATIENT
Start: 2024-08-19

## 2025-01-21 ENCOUNTER — OFFICE VISIT (OUTPATIENT)
Dept: FAMILY MEDICINE CLINIC | Facility: CLINIC | Age: 45
End: 2025-01-21
Payer: MEDICARE

## 2025-01-21 VITALS
HEIGHT: 69 IN | OXYGEN SATURATION: 100 % | SYSTOLIC BLOOD PRESSURE: 100 MMHG | TEMPERATURE: 97.7 F | HEART RATE: 79 BPM | WEIGHT: 163 LBS | BODY MASS INDEX: 24.14 KG/M2 | DIASTOLIC BLOOD PRESSURE: 60 MMHG

## 2025-01-21 DIAGNOSIS — G81.10 SPASTIC HEMIPLEGIA AFFECTING DOMINANT SIDE: ICD-10-CM

## 2025-01-21 DIAGNOSIS — Z98.890 HISTORY OF REPAIR OF MITRAL VALVE: ICD-10-CM

## 2025-01-21 DIAGNOSIS — E55.9 VITAMIN D DEFICIENCY: ICD-10-CM

## 2025-01-21 DIAGNOSIS — F41.1 GENERALIZED ANXIETY DISORDER: ICD-10-CM

## 2025-01-21 DIAGNOSIS — M79.641 PAIN IN RIGHT HAND: ICD-10-CM

## 2025-01-21 DIAGNOSIS — D25.9 UTERINE LEIOMYOMA, UNSPECIFIED LOCATION: ICD-10-CM

## 2025-01-21 DIAGNOSIS — F33.42 RECURRENT MAJOR DEPRESSIVE DISORDER, IN FULL REMISSION: ICD-10-CM

## 2025-01-21 DIAGNOSIS — E78.5 DYSLIPIDEMIA: Primary | ICD-10-CM

## 2025-01-21 DIAGNOSIS — R56.9 SEIZURE: ICD-10-CM

## 2025-01-21 DIAGNOSIS — Z23 ENCOUNTER FOR IMMUNIZATION: ICD-10-CM

## 2025-01-21 DIAGNOSIS — M85.80 OSTEOPENIA, UNSPECIFIED LOCATION: ICD-10-CM

## 2025-01-21 DIAGNOSIS — M62.89 MUSCLE HYPERTONICITY: ICD-10-CM

## 2025-01-21 PROCEDURE — 1159F MED LIST DOCD IN RCRD: CPT | Performed by: NURSE PRACTITIONER

## 2025-01-21 PROCEDURE — G0008 ADMIN INFLUENZA VIRUS VAC: HCPCS | Performed by: NURSE PRACTITIONER

## 2025-01-21 PROCEDURE — 90656 IIV3 VACC NO PRSV 0.5 ML IM: CPT | Performed by: NURSE PRACTITIONER

## 2025-01-21 PROCEDURE — 1160F RVW MEDS BY RX/DR IN RCRD: CPT | Performed by: NURSE PRACTITIONER

## 2025-01-21 PROCEDURE — 1125F AMNT PAIN NOTED PAIN PRSNT: CPT | Performed by: NURSE PRACTITIONER

## 2025-01-21 PROCEDURE — 99214 OFFICE O/P EST MOD 30 MIN: CPT | Performed by: NURSE PRACTITIONER

## 2025-01-21 RX ORDER — DULOXETIN HYDROCHLORIDE 60 MG/1
60 CAPSULE, DELAYED RELEASE ORAL DAILY
Qty: 90 CAPSULE | Refills: 1 | Status: SHIPPED | OUTPATIENT
Start: 2025-01-21

## 2025-01-21 RX ORDER — DULOXETIN HYDROCHLORIDE 30 MG/1
30 CAPSULE, DELAYED RELEASE ORAL NIGHTLY
Qty: 90 CAPSULE | Refills: 1 | Status: SHIPPED | OUTPATIENT
Start: 2025-01-21

## 2025-01-21 NOTE — PATIENT INSTRUCTIONS
Monitor your blood pressure periodically and record results.  Continue to work on healthy diet and exercise.  Follow up pending lab results.  Follow up in 6 months for Medicare Wellness, or sooner if problems or concerns.

## 2025-01-21 NOTE — PROGRESS NOTES
Subjective   Ania Reyes is a 44 y.o. female.     Chief Complaint   Patient presents with    Hyperlipidemia    Hypertension     Answers submitted by the patient for this visit:  Primary Reason for Visit (Submitted on 1/14/2025)  What is the primary reason for your visit?: Problem Not Listed    History of Present Illness   Patient presents for follow up S/P MVR: takes Metoprolol daily; does not typically monitor BP; no headaches; no orthostasis; no swelling; sees Dr. Mccurdy cardiology annually; had follow up in June and recommended to increase exercise; to follow up in December.     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     F/U right hand pain/muscle hypertonicity: takes Baclofen twice daily and Diclofenac three times daily and works well; takes Baclofen in early morning and again around 1000 to prevent hand drawing up around 0930 and works well; also takes Gabapentin TID and works well; no more dizziness since decreased to TID; sees pain management; gets Botox injections in right hand and foot per Dr. Trammell at [facility] and helps foot more than right hand; does not last more than 1 month in right hand; has not been wearing brace to stretch hand as recommended per PT like should, but does some stretches of right hand; wears brace at night when sleeps; finds self not using right hand since easier to do things with left due to weakness and decreased ROM of right hand.     F/U seizures/spastic hemiplegia/history of stroke: takes Keppra twice daily; sees neurology, has follow up in February; goes to Epilepsy clinic; no seizures since has been on medication.     F/U depression/anxiety: takes Duloxetine twice daily; takes 60 mg at 10:00 a.m. and 30 mg at bedtime and working well; sees psychiatry  annually at this point and counselor once per year; no problems with sleep; see PHQ-2 and CALIN-7; no SI/HI; would like PCP to take over Rx.     Saw GYN for uterine fibroid; discussed Mirena IUD for heavy menses; has not had IUD placed yet; will consider hysterectomy if no improvement with IUD.      Mother was present during the history-taking and subsequent discussion with this patient.  Patient agrees to the presence of the individual during this visit.       The following portions of the patient's history were reviewed and updated as appropriate: allergies, current medications, past family history, past medical history, past social history, past surgical history and problem list.      Current Outpatient Medications   Medication Sig Dispense Refill    aspirin 81 MG tablet Take 1 tablet by mouth Daily.      atorvastatin (LIPITOR) 20 MG tablet Take 1 tablet by mouth Daily.      baclofen (LIORESAL) 10 MG tablet TAKE 1 TABLET BY MOUTH TWICE DAILY AS NEEDED FOR MUSCLE SPASMS 180 tablet 1    Cyanocobalamin (Vitamin B-12) 5000 MCG tablet dispersible Place 1 tablet on the tongue Every 30 (Thirty) Days.      diazePAM (DIASTAT ACUDIAL) 20 MG rectal kit INSERT 12.5MG INTO THE RECTUM ONCE FOR 1 DOSE FOR GENERALIZED SEIZURE LASTING OVER 3 MINUTES.      diclofenac (VOLTAREN) 50 MG EC tablet Take 1 tablet by mouth 3 (Three) Times a Day As Needed (hand pain). 270 tablet 1    DULoxetine (CYMBALTA) 30 MG capsule Take 1 capsule by mouth Every Night. in addition to 60 mg in morning 90 capsule 1    DULoxetine (CYMBALTA) 60 MG capsule Take 1 capsule by mouth Daily. In addition to 30 mg nightly 90 capsule 1    famotidine (PEPCID) 20 MG tablet Take 1 tablet by mouth Daily.      gabapentin (NEURONTIN) 600 MG tablet Take 1 tablet by mouth 3 (Three) Times a Day.  5    levETIRAcetam (KEPPRA) 500 MG tablet Take 1 tablet by mouth 2 (Two) Times a Day.      metoprolol succinate XL (TOPROL-XL) 25 MG 24 hr tablet Take 1 tablet by mouth Daily.  3     Multiple Vitamins-Minerals (MULTIVITAMIN ADULTS PO) Take 1 tablet by mouth Daily.      potassium chloride (K-DUR) 10 MEQ CR tablet Take 1 tablet by mouth Daily.      Vitamin D, Cholecalciferol, 25 MCG (1000 UT) capsule Take 2 capsules by mouth Daily.       No current facility-administered medications for this visit.       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 05/11/2015    Description: 02- - (N) Coronaries - LVEF 55% - Severe prolapse of the anterior & posterior mitral leaflet with severe MR,    Functional cardiac murmur 04/14/2014    Gingival enlargement 11/17/2016    Gum hypertrophy     Heart failure 08/13/2021    NYHA class 3 NYHA class 3 NYHA class 3 NYHA class 3    High risk medication use     History of acute sinusitis     History of dizziness     History of echocardiogram     History of hyperparathyroidism     History of kidney stones     Bilateral    History of low back pain     History of mitral valve repair     [employer] annuloplasty ring, complex MV repair with significant intra-op and post op complications    History of nephrolithiasis     History of stroke     LMCA; right hemiplegia    History of transesophageal echocardiography (PRASHANT)     Hydroureteronephrosis 12/21/2020 12/21/20 CT abd/pelvis: 4-5 mm obstructing stone located in the distal left ureter with mild-moderate left hydroureteronephrosis; tiny nonobstructing stones elsewhere in both kidneys    Kidney stone     3mm     Lactase deficiency     Left flank pain 12/07/2020    Left nephrolithiasis  2019    Methicillin resistant Staphylococcus aureus infection 2015    No A2K system hx - now +MRSA sputum on 3/13/2015    Mitral regurgitation     Mitral valve prolapse     Muscle hypertonicity     Need for SBE (subacute bacterial endocarditis) prophylaxis 2019    Osteopenia     Pain in right hand     Pain, wrist joint     Parathyroid adenoma     Positive depression screening     Primary hyperparathyroidism     Right arm pain     Right hemiplegia 2015    Seizures     Sinus tachycardia     Spastic hemiplegia affecting dominant side     Stroke 2015    Vitamin D deficiency        Past Surgical History:   Procedure Laterality Date    CARDIAC SURGERY      CYSTOSCOPY URETEROSCOPY LASER LITHOTRIPSY      KNEE SURGERY Left     MITRAL VALVE REPAIR/REPLACEMENT  2015    annuloplasty ring, complex MV repair with significant intra-op and post-op complications    OTHER SURGICAL HISTORY      Selective Arterial Catheterization     PARATHYROID GLAND SURGERY      Parathyroid resection       Family History   Problem Relation Age of Onset    Depression Mother     Anxiety disorder Mother     Coronary artery disease Mother     Glaucoma Mother     Nephrolithiasis Mother     Heart disease Mother     No Known Problems Father     Anxiety disorder Sister     Colon cancer Maternal Grandmother         diagnosed in her 60's    Breast cancer Maternal Grandmother     Ovarian cancer Maternal Grandmother     Coronary artery disease Maternal Grandfather     Heart attack Maternal Grandfather          at age 54 years    Breast cancer Maternal Aunt         diagnosed in 40s    Ovarian cancer Maternal Aunt     Osteoporosis Maternal Aunt     No Known Problems Other     Colon cancer Maternal Great-Grandfather        Social History     Socioeconomic History    Marital status:    Tobacco Use    Smoking status: Never     Passive exposure: Never    Smokeless tobacco: Never   Vaping Use    Vaping status: Never  "Used   Substance and Sexual Activity    Alcohol use: Yes     Alcohol/week: 1.0 standard drink of alcohol     Types: 1 Drinks containing 0.5 oz of alcohol per week     Comment: social drinker    Drug use: Never    Sexual activity: Yes     Partners: Male     Birth control/protection: None       Review of Systems   Constitutional:  Negative for appetite change, chills, diaphoresis, fatigue, fever, unexpected weight gain and unexpected weight loss.   HENT:  Negative for congestion, sore throat and swollen glands.    Eyes:  Negative for blurred vision.   Respiratory:  Negative for cough, chest tightness and shortness of breath.    Cardiovascular:  Negative for chest pain and palpitations.   Gastrointestinal:  Negative for abdominal pain, blood in stool, constipation, diarrhea, nausea and vomiting.   Endocrine: Negative for cold intolerance, heat intolerance and polydipsia.   Genitourinary:  Negative for dysuria and frequency.   Musculoskeletal:  Negative for myalgias and neck pain.   Skin:  Negative for rash.   Neurological:  Negative for syncope, weakness and numbness.       PHQ-2 Depression Screening  Little interest or pleasure in doing things? Not at all   Feeling down, depressed, or hopeless? Not at all   PHQ-2 Total Score 0     CALIN-7 anxiety score: 1    Objective   Vitals:    01/21/25 1307   BP: 100/60   BP Location: Left arm   Patient Position: Sitting   Cuff Size: Adult   Pulse: 79   Temp: 97.7 °F (36.5 °C)   SpO2: 100%   Weight: 73.9 kg (163 lb)   Height: 175.3 cm (69\")     Body mass index is 24.07 kg/m².    Physical Exam  Vitals and nursing note reviewed.   Constitutional:       General: She is not in acute distress.     Appearance: She is well-developed and well-groomed. She is not diaphoretic.   HENT:      Head: Normocephalic.      Right Ear: External ear normal.      Left Ear: External ear normal.   Eyes:      Conjunctiva/sclera: Conjunctivae normal.   Neck:      Vascular: No carotid bruit.   Cardiovascular: "      Rate and Rhythm: Normal rate and regular rhythm.      Pulses: Normal pulses.      Heart sounds: Normal heart sounds.   Pulmonary:      Effort: Pulmonary effort is normal. No respiratory distress.      Breath sounds: Normal breath sounds.   Abdominal:      General: Bowel sounds are normal.      Palpations: Abdomen is soft. There is no hepatomegaly or splenomegaly.      Tenderness: There is no abdominal tenderness. There is no guarding.   Musculoskeletal:      Cervical back: Normal range of motion and neck supple. No bony tenderness.      Thoracic back: No bony tenderness.      Lumbar back: No bony tenderness.      Right lower leg: No edema.      Left lower leg: No edema.   Skin:     General: Skin is warm and dry.      Findings: No rash.   Neurological:      Mental Status: She is alert and oriented to person, place, and time.      Motor: Weakness (of right hand compared to left; decreased ROM of right hand due to hypertonicity) present.      Gait: Abnormal gait: mild limping on right.   Psychiatric:         Mood and Affect: Mood normal.         Behavior: Behavior normal.         Thought Content: Thought content normal.         Cognition and Memory: Cognition normal.         Judgment: Judgment normal.         Lab Results   Component Value Date    WBC 7.0 07/15/2024    RBC 4.22 07/15/2024    HGB 12.4 07/15/2024    HCT 39.4 07/15/2024    MCV 93 07/15/2024    MCH 29.4 07/15/2024    MCHC 31.5 07/15/2024    RDW 11.9 07/15/2024     07/15/2024    NEUTRORELPCT 68 07/15/2024    LYMPHORELPCT 24 07/15/2024    MONORELPCT 6 07/15/2024    EOSRELPCT 1 07/15/2024    BASORELPCT 1 07/15/2024    NEUTROABS 4.7 07/15/2024    LYMPHSABS 1.7 07/15/2024    MONOSABS 0.4 07/15/2024    EOSABS 0.1 07/15/2024    BASOSABS 0.1 07/15/2024    NRBC 0.0 12/11/2020     Lab Results   Component Value Date    GLUCOSE 84 07/15/2024    BUN 18 07/15/2024    CREATININE 0.90 07/15/2024    EGFRIFNONA 106 11/05/2021    EGFRIFAFRI 122 11/05/2021    BCR  20 07/15/2024    K 4.2 07/15/2024    CO2 25 07/15/2024    CALCIUM 9.9 07/15/2024    PROTENTOTREF 6.9 07/15/2024    ALBUMIN 4.3 07/15/2024    LABIL2 1.6 12/06/2023    AST 14 07/15/2024    ALT 21 07/15/2024      Lab Results   Component Value Date    CHLPL 219 (H) 07/15/2024    TRIG 432 (H) 07/15/2024    HDL 36 (L) 07/15/2024    VLDL 74 (H) 07/15/2024     (H) 07/15/2024     Lab Results   Component Value Date    TSH 3.130 12/06/2023     Lab Results   Component Value Date    COLORU Yellow 07/25/2022    CLARITYU Clear 07/25/2022    LEUKOCYTESUR Negative 07/25/2022    BILIRUBINUR Negative 07/25/2022          Assessment    Problem List Items Addressed This Visit       Spastic hemiplegia affecting dominant side    Current Assessment & Plan     Continue stretching as per PT.  Continue Baclofen twice daily.         Relevant Orders    CBC & Differential    Comprehensive Metabolic Panel    Pain in right hand    Current Assessment & Plan     Stable.  Continue Diclofenac twice daily.  Continue Gabapentin TID.  Follow up as scheduled with pain management.         Relevant Orders    CBC & Differential    Comprehensive Metabolic Panel    Muscle hypertonicity    Current Assessment & Plan     Stable.  Continue Baclofen twice daily and Diclofenac TID.         History of repair of mitral valve    Overview     Description: [employer] annuloplasty ring, complex MV repair with significant intra-op and post-op complications         Current Assessment & Plan     Stable.  Continue Metoprolol daily.  Follow up as scheduled with cardiology.         Dyslipidemia - Primary    Current Assessment & Plan     Continue Atorvastatin daily.  Continue to work on healthy diet and exercise.         Relevant Orders    Comprehensive Metabolic Panel    Lipid Panel With LDL / HDL Ratio    Osteopenia    Current Assessment & Plan     Continue Vitamin D daily.  Continue weight bearing exercise.         Vitamin D deficiency    Relevant Orders     Vitamin D,25-Hydroxy    Seizure    Overview     First seizure 4/1/21 and second June 2021         Current Assessment & Plan     Stable.  Continue Keppra twice daily.  Follow up as scheduled with neurology.         Anxiety disorder    Current Assessment & Plan     Psychological condition is stable.  Continue current treatment regimen.  Psychological condition  will be reassessed in 6 months.  Continue Duloxetine twice daily.         Relevant Medications    DULoxetine (CYMBALTA) 30 MG capsule    DULoxetine (CYMBALTA) 60 MG capsule    Uterine fibroid    Current Assessment & Plan     Follow up as scheduled with GYN.         Recurrent major depressive disorder, in full remission    Current Assessment & Plan     Patient's depression is a recurrent episode that is mild without psychosis. Depression is in full remission and stable.    Plan:   Continue current medication therapy   Continue Duloxetine twice daily.    Followup in 6 months.          Relevant Medications    DULoxetine (CYMBALTA) 30 MG capsule    DULoxetine (CYMBALTA) 60 MG capsule     Other Visit Diagnoses       Encounter for immunization        Relevant Orders    Fluzone >6mos (Completed)              Return in about 6 months (around 7/21/2025) for Medicare Wellness, Recheck.or sooner if symptoms persist or worsen.

## 2025-01-22 PROBLEM — R06.89 DECREASED BREATH SOUNDS AT RIGHT LUNG BASE: Status: RESOLVED | Noted: 2024-03-12 | Resolved: 2025-01-22

## 2025-01-22 PROBLEM — F33.42 RECURRENT MAJOR DEPRESSIVE DISORDER, IN FULL REMISSION: Status: ACTIVE | Noted: 2025-01-22

## 2025-01-22 LAB
25(OH)D3+25(OH)D2 SERPL-MCNC: 36.2 NG/ML (ref 30–100)
ALBUMIN SERPL-MCNC: 4.4 G/DL (ref 3.9–4.9)
ALP SERPL-CCNC: 129 IU/L (ref 44–121)
ALT SERPL-CCNC: 61 IU/L (ref 0–32)
AST SERPL-CCNC: 44 IU/L (ref 0–40)
BASOPHILS # BLD AUTO: 0.1 X10E3/UL (ref 0–0.2)
BASOPHILS NFR BLD AUTO: 1 %
BILIRUB SERPL-MCNC: 0.4 MG/DL (ref 0–1.2)
BUN SERPL-MCNC: 17 MG/DL (ref 6–24)
BUN/CREAT SERPL: 19 (ref 9–23)
CALCIUM SERPL-MCNC: 9.4 MG/DL (ref 8.7–10.2)
CHLORIDE SERPL-SCNC: 104 MMOL/L (ref 96–106)
CHOLEST SERPL-MCNC: 238 MG/DL (ref 100–199)
CO2 SERPL-SCNC: 24 MMOL/L (ref 20–29)
CREAT SERPL-MCNC: 0.88 MG/DL (ref 0.57–1)
EGFRCR SERPLBLD CKD-EPI 2021: 84 ML/MIN/1.73
EOSINOPHIL # BLD AUTO: 0.2 X10E3/UL (ref 0–0.4)
EOSINOPHIL NFR BLD AUTO: 3 %
ERYTHROCYTE [DISTWIDTH] IN BLOOD BY AUTOMATED COUNT: 11.9 % (ref 11.7–15.4)
GLOBULIN SER CALC-MCNC: 2.3 G/DL (ref 1.5–4.5)
GLUCOSE SERPL-MCNC: 79 MG/DL (ref 70–99)
HCT VFR BLD AUTO: 41.2 % (ref 34–46.6)
HDLC SERPL-MCNC: 43 MG/DL
HGB BLD-MCNC: 13.1 G/DL (ref 11.1–15.9)
IMM GRANULOCYTES # BLD AUTO: 0 X10E3/UL (ref 0–0.1)
IMM GRANULOCYTES NFR BLD AUTO: 0 %
LDLC SERPL CALC-MCNC: 153 MG/DL (ref 0–99)
LDLC/HDLC SERPL: 3.6 RATIO (ref 0–3.2)
LYMPHOCYTES # BLD AUTO: 1.9 X10E3/UL (ref 0.7–3.1)
LYMPHOCYTES NFR BLD AUTO: 28 %
MCH RBC QN AUTO: 29.2 PG (ref 26.6–33)
MCHC RBC AUTO-ENTMCNC: 31.8 G/DL (ref 31.5–35.7)
MCV RBC AUTO: 92 FL (ref 79–97)
MONOCYTES # BLD AUTO: 0.4 X10E3/UL (ref 0.1–0.9)
MONOCYTES NFR BLD AUTO: 5 %
NEUTROPHILS # BLD AUTO: 4.3 X10E3/UL (ref 1.4–7)
NEUTROPHILS NFR BLD AUTO: 63 %
PLATELET # BLD AUTO: 265 X10E3/UL (ref 150–450)
POTASSIUM SERPL-SCNC: 4.4 MMOL/L (ref 3.5–5.2)
PROT SERPL-MCNC: 6.7 G/DL (ref 6–8.5)
RBC # BLD AUTO: 4.49 X10E6/UL (ref 3.77–5.28)
SODIUM SERPL-SCNC: 140 MMOL/L (ref 134–144)
TRIGL SERPL-MCNC: 227 MG/DL (ref 0–149)
VLDLC SERPL CALC-MCNC: 42 MG/DL (ref 5–40)
WBC # BLD AUTO: 6.9 X10E3/UL (ref 3.4–10.8)

## 2025-01-22 NOTE — ASSESSMENT & PLAN NOTE
Patient's depression is a recurrent episode that is mild without psychosis. Depression is in full remission and stable.    Plan:   Continue current medication therapy   Continue Duloxetine twice daily.    Followup in 6 months.

## 2025-01-22 NOTE — ASSESSMENT & PLAN NOTE
Psychological condition is stable.  Continue current treatment regimen.  Psychological condition  will be reassessed in 6 months.  Continue Duloxetine twice daily.

## 2025-01-22 NOTE — ASSESSMENT & PLAN NOTE
Stable.  Continue Diclofenac twice daily.  Continue Gabapentin TID.  Follow up as scheduled with pain management.

## 2025-01-24 DIAGNOSIS — E78.5 DYSLIPIDEMIA: Primary | ICD-10-CM

## 2025-02-14 DIAGNOSIS — M62.89 MUSCLE HYPERTONICITY: ICD-10-CM

## 2025-02-14 DIAGNOSIS — G81.10 SPASTIC HEMIPLEGIA AFFECTING DOMINANT SIDE: ICD-10-CM

## 2025-02-14 DIAGNOSIS — M79.641 PAIN IN RIGHT HAND: ICD-10-CM

## 2025-02-19 DIAGNOSIS — M79.641 PAIN IN RIGHT HAND: ICD-10-CM

## 2025-02-19 DIAGNOSIS — G81.10 SPASTIC HEMIPLEGIA AFFECTING DOMINANT SIDE: ICD-10-CM

## 2025-02-19 DIAGNOSIS — M62.89 MUSCLE HYPERTONICITY: ICD-10-CM

## 2025-02-19 RX ORDER — BACLOFEN 10 MG/1
TABLET ORAL
Qty: 180 TABLET | Refills: 0 | Status: SHIPPED | OUTPATIENT
Start: 2025-02-19

## 2025-02-24 ENCOUNTER — LAB (OUTPATIENT)
Facility: HOSPITAL | Age: 45
End: 2025-02-24
Payer: MEDICARE

## 2025-02-24 PROCEDURE — 80053 COMPREHEN METABOLIC PANEL: CPT | Performed by: NURSE PRACTITIONER

## 2025-02-24 PROCEDURE — 80061 LIPID PANEL: CPT | Performed by: NURSE PRACTITIONER

## 2025-05-07 DIAGNOSIS — M79.641 PAIN IN RIGHT HAND: ICD-10-CM

## 2025-05-07 DIAGNOSIS — G81.10 SPASTIC HEMIPLEGIA AFFECTING DOMINANT SIDE: ICD-10-CM

## 2025-05-07 DIAGNOSIS — M62.89 MUSCLE HYPERTONICITY: ICD-10-CM

## 2025-05-16 ENCOUNTER — OFFICE VISIT (OUTPATIENT)
Dept: FAMILY MEDICINE CLINIC | Facility: CLINIC | Age: 45
End: 2025-05-16
Payer: MEDICARE

## 2025-05-16 VITALS
TEMPERATURE: 98.2 F | OXYGEN SATURATION: 99 % | BODY MASS INDEX: 25.15 KG/M2 | WEIGHT: 169.8 LBS | HEIGHT: 69 IN | DIASTOLIC BLOOD PRESSURE: 78 MMHG | SYSTOLIC BLOOD PRESSURE: 102 MMHG | HEART RATE: 66 BPM

## 2025-05-16 DIAGNOSIS — F33.42 RECURRENT MAJOR DEPRESSIVE DISORDER, IN FULL REMISSION: ICD-10-CM

## 2025-05-16 DIAGNOSIS — Z98.890 HISTORY OF REPAIR OF MITRAL VALVE: ICD-10-CM

## 2025-05-16 DIAGNOSIS — E53.8 VITAMIN B12 DEFICIENCY: ICD-10-CM

## 2025-05-16 DIAGNOSIS — R19.7 DIARRHEA, UNSPECIFIED TYPE: Primary | ICD-10-CM

## 2025-05-16 DIAGNOSIS — E78.5 DYSLIPIDEMIA: ICD-10-CM

## 2025-05-16 DIAGNOSIS — F41.9 ANXIETY DISORDER, UNSPECIFIED TYPE: ICD-10-CM

## 2025-05-16 DIAGNOSIS — D25.9 UTERINE LEIOMYOMA, UNSPECIFIED LOCATION: ICD-10-CM

## 2025-05-16 PROCEDURE — 1159F MED LIST DOCD IN RCRD: CPT | Performed by: NURSE PRACTITIONER

## 2025-05-16 PROCEDURE — 1160F RVW MEDS BY RX/DR IN RCRD: CPT | Performed by: NURSE PRACTITIONER

## 2025-05-16 PROCEDURE — 99214 OFFICE O/P EST MOD 30 MIN: CPT | Performed by: NURSE PRACTITIONER

## 2025-05-16 PROCEDURE — 1125F AMNT PAIN NOTED PAIN PRSNT: CPT | Performed by: NURSE PRACTITIONER

## 2025-05-16 NOTE — PATIENT INSTRUCTIONS
Return stool samples.  Make sure drinking adequate liquids.  Try over the counter MiraLAX and see if helps empty bowels more completely due to some hard bowel movements.  Try over the counter antihistamine, such as Claritin or Allegra.  Continue to work on healthy diet and exercise.  Follow up pending lab results.  Follow up in 2 months for Medicare Wellness, or sooner if symptoms persist or worsen.

## 2025-05-16 NOTE — PROGRESS NOTES
Subjective   Ania Reyes is a 44 y.o. female.     Chief Complaint   Patient presents with    Diarrhea     For about a month now it seems like everything she eats goes straight through her    Pain     She has had some abdominal pain as well       History of Present Illness   Patient presents with c/o diarrhea and abdominal pain for last month; feels like everything she eats goes straight through her; will have diarrhea 4-5 times after eating; diarrhea will be watery, but has also had some hard BM; has discomfort in lower abdomen, particularly after eating and will improve after going to bathroom several times; no blood in BM; no fever; no recent antibiotics or camping; symptoms do not seem to be related to a particular food; will have hard stool mixed in with watery stool; takes Lactaid 6 tablets as needed if will eat dairy due to lactose intolerant.    F/U S/P MVR: takes Metoprolol daily; does not monitor BP; no headaches; no orthostasis; has had couple episodes of lightheadedness with walking in last month; no ear pain or runny nose; no swelling; sees Dr. Mccurdy cardiology annually; had follow up in December and no changes.     F/U dyslipidemia: takes Atorvastatin daily; no myalgias; has been snacking too much; has been eating a lot of vanilla wafers; fasting today.     F/U right hand pain/muscle hypertonicity: takes Baclofen twice daily and Diclofenac three times daily and help;   takes Baclofen in early morning and again around 1000 to prevent hand drawing up around 0930 and helps; also takes Gabapentin TID and helps; dizziness much better since decreased to TID; sees pain management; gets Botox injections in right hand and foot per Dr. Trammell at [facility] and helps foot more than right hand; Botox has been helping longer, gets relief for about 2 months; has not been wearing brace to stretch hand as recommended per PT like should, but does some stretches of right hand; wears brace at night when sleeps;  has been trying to do more things with right hand; able to push mow grass.     F/U seizures/spastic hemiplegia/history of stroke: takes Keppra twice daily; sees neurology, had follow up in February and no changes; goes to Epilepsy clinic; no seizures since has been on medication.     F/U depression/anxiety: takes Duloxetine twice daily; takes 60 mg at 10:00 a.m. and 30 mg at bedtime and works well; no longer seeing psychiatry or counselor; things have been good since has been living with mother; no SI/HI.     Saw GYN for uterine fibroid; discussed Mirena IUD for heavy menses; has not had IUD placed yet; will consider hysterectomy if no improvement with IUD; has not had IUD placed due to timing with menses.     Mother was present during the history-taking and subsequent discussion with this patient.  Patient agrees to the presence of the individual during this visit.       The following portions of the patient's history were reviewed and updated as appropriate: allergies, current medications, past family history, past medical history, past social history, past surgical history and problem list.    Current Outpatient Medications on File Prior to Visit   Medication Sig    aspirin 81 MG tablet Take 1 tablet by mouth Daily.    atorvastatin (LIPITOR) 20 MG tablet Take 1 tablet by mouth Daily.    baclofen (LIORESAL) 10 MG tablet TAKE 1 TABLET BY MOUTH TWICE DAILY AS NEEDED FOR MUSCLE SPASMS    Cyanocobalamin (Vitamin B-12) 5000 MCG tablet dispersible Place 1 tablet on the tongue Every 30 (Thirty) Days.    diazePAM (DIASTAT ACUDIAL) 20 MG rectal kit INSERT 12.5MG INTO THE RECTUM ONCE FOR 1 DOSE FOR GENERALIZED SEIZURE LASTING OVER 3 MINUTES.    diclofenac (VOLTAREN) 50 MG EC tablet TAKE 1 TABLET BY MOUTH THREE TIMES DAILY AS NEEDED FOR HAND PAIN (TAKE WITH FOOD)    DULoxetine (CYMBALTA) 30 MG capsule Take 1 capsule by mouth Every Night. in addition to 60 mg in morning    DULoxetine (CYMBALTA) 60 MG capsule Take 1 capsule by  mouth Daily. In addition to 30 mg nightly    famotidine (PEPCID) 20 MG tablet Take 1 tablet by mouth Daily.    gabapentin (NEURONTIN) 600 MG tablet Take 1 tablet by mouth 3 (Three) Times a Day.    levETIRAcetam (KEPPRA) 500 MG tablet Take 1 tablet by mouth 2 (Two) Times a Day.    metoprolol succinate XL (TOPROL-XL) 25 MG 24 hr tablet Take 1 tablet by mouth Daily.    Multiple Vitamins-Minerals (MULTIVITAMIN ADULTS PO) Take 1 tablet by mouth Daily.    potassium chloride (K-DUR) 10 MEQ CR tablet Take 1 tablet by mouth Daily.    Vitamin D, Cholecalciferol, 25 MCG (1000 UT) capsule Take 2 capsules by mouth Daily.     No current facility-administered medications on file prior to visit.        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 05/11/2015    Description: 02- - (N) Coronaries - LVEF 55% - Severe prolapse of the anterior & posterior mitral leaflet with severe MR,    Functional cardiac murmur 04/14/2014    Gingival enlargement 11/17/2016    Gum hypertrophy     Heart failure 08/13/2021    NYHA class 3 NYHA class 3 NYHA class 3 NYHA class 3    High risk medication use     History of acute sinusitis     History of dizziness     History of echocardiogram     History of hyperparathyroidism     History of kidney stones     Bilateral    History of low back pain     History of mitral valve repair     [employer] annuloplasty ring, complex MV repair with significant intra-op and post op complications    History of nephrolithiasis     History of stroke     LMCA; right hemiplegia     History of transesophageal echocardiography (PRASHANT)     Hydroureteronephrosis 12/21/2020 12/21/20 CT abd/pelvis: 4-5 mm obstructing stone located in the distal left ureter with mild-moderate left hydroureteronephrosis; tiny nonobstructing stones elsewhere in both kidneys    Kidney stone     3mm     Lactase deficiency     Left flank pain 12/07/2020    Left nephrolithiasis 08/12/2019    Methicillin resistant Staphylococcus aureus infection 03/13/2015    No A2K system hx - now +MRSA sputum on 3/13/2015    Mitral regurgitation     Mitral valve prolapse     Muscle hypertonicity     Need for SBE (subacute bacterial endocarditis) prophylaxis 08/12/2019    Osteopenia     Pain in right hand     Pain, wrist joint     Parathyroid adenoma     PMS (premenstrual syndrome)     Positive depression screening     Primary hyperparathyroidism     Right arm pain     Right hemiplegia 05/28/2015    Seizures     Sinus tachycardia     Spastic hemiplegia affecting dominant side     Stroke 05/2015    Vitamin D deficiency        Past Surgical History:   Procedure Laterality Date    CARDIAC SURGERY  2015    CYSTOSCOPY URETEROSCOPY LASER LITHOTRIPSY      KNEE SURGERY Left     MITRAL VALVE REPAIR/REPLACEMENT  03/2015    annuloplasty ring, complex MV repair with significant intra-op and post-op complications    OTHER SURGICAL HISTORY      Selective Arterial Catheterization     PARATHYROID GLAND SURGERY  2014    Parathyroid resection       Family History   Problem Relation Age of Onset    Depression Mother     Anxiety disorder Mother     Coronary artery disease Mother     Glaucoma Mother     Nephrolithiasis Mother     Heart disease Mother     Osteoporosis Mother     No Known Problems Father     Anxiety disorder Sister     Colon cancer Maternal Grandmother         diagnosed in her 60's    Breast cancer Maternal Grandmother     Ovarian cancer Maternal Grandmother     Coronary artery disease Maternal Grandfather     Heart attack Maternal Grandfather           at age 54 years    Breast cancer Maternal Aunt         diagnosed in 40s    Ovarian cancer Maternal Aunt     Osteoporosis Maternal Aunt     Breast cancer Maternal Aunt     No Known Problems Other     Colon cancer Maternal Great-Grandfather        Social History     Socioeconomic History    Marital status:    Tobacco Use    Smoking status: Never     Passive exposure: Never    Smokeless tobacco: Never   Vaping Use    Vaping status: Never Used   Substance and Sexual Activity    Alcohol use: Yes     Alcohol/week: 1.0 standard drink of alcohol     Types: 1 Drinks containing 0.5 oz of alcohol per week     Comment: social drinker    Drug use: Never    Sexual activity: Yes     Partners: Male     Birth control/protection: None       Review of Systems   Constitutional:  Negative for appetite change, chills, diaphoresis, fatigue, fever, unexpected weight gain (has gained weight since eating more) and unexpected weight loss.   HENT:  Negative for congestion, sore throat, swollen glands and trouble swallowing.    Eyes:  Negative for blurred vision.   Respiratory:  Negative for cough, chest tightness and shortness of breath.    Cardiovascular:  Negative for chest pain and palpitations.   Gastrointestinal:  Negative for abdominal pain (see HPI), nausea, vomiting, GERD and indigestion. Diarrhea: see HPI.  Endocrine: Negative for cold intolerance, heat intolerance and polydipsia.   Genitourinary:  Negative for difficulty urinating, dysuria and frequency.   Musculoskeletal:  Negative for myalgias and neck pain. Back pain: some in left lower back at times, no radiation of pain down legs.  Skin:  Negative for rash.   Neurological:  Negative for weakness and numbness.       Objective   Vitals:    25 0912   BP: 102/78   BP Location: Left arm   Patient Position: Sitting   Cuff Size: Adult   Pulse: 66   Temp: 98.2 °F (36.8 °C)   TempSrc: Temporal   SpO2: 99%   Weight: 77 kg (169 lb 12.8 oz)   Height: 175.3 cm  "(69.02\")     Body mass index is 25.06 kg/m².    Physical Exam  Vitals and nursing note reviewed.   Constitutional:       General: She is not in acute distress.     Appearance: She is well-developed and well-groomed. She is not diaphoretic.   HENT:      Head: Normocephalic.      Right Ear: External ear normal. No decreased hearing noted. Right ear middle ear effusion: TM dull. Tympanic membrane is not erythematous.      Left Ear: External ear normal. No decreased hearing noted. Left ear middle ear effusion: TM dull. Tympanic membrane is not erythematous.      Nose: Nose normal.      Right Sinus: No maxillary sinus tenderness or frontal sinus tenderness.      Left Sinus: No maxillary sinus tenderness or frontal sinus tenderness.      Mouth/Throat:      Mouth: Mucous membranes are moist.      Pharynx: No oropharyngeal exudate or posterior oropharyngeal erythema.   Eyes:      Conjunctiva/sclera: Conjunctivae normal.   Neck:      Vascular: No carotid bruit.   Cardiovascular:      Rate and Rhythm: Normal rate and regular rhythm.      Pulses: Normal pulses.      Heart sounds: Normal heart sounds. No murmur heard.  Pulmonary:      Effort: Pulmonary effort is normal. No respiratory distress.      Breath sounds: Normal breath sounds.   Abdominal:      General: Bowel sounds are normal.      Palpations: Abdomen is soft. There is no hepatomegaly or splenomegaly.      Tenderness: Tenderness: mild. There is no right CVA tenderness, guarding or rebound. Left CVA tenderness: mild.  Musculoskeletal:      Cervical back: Normal range of motion and neck supple.      Right lower leg: No edema.      Left lower leg: No edema.   Lymphadenopathy:      Cervical: No cervical adenopathy.   Skin:     General: Skin is warm and dry.      Findings: No rash.   Neurological:      Mental Status: She is alert and oriented to person, place, and time.      Gait: Abnormal gait: guarded gait.   Psychiatric:         Mood and Affect: Mood normal.         " Behavior: Behavior normal.         Thought Content: Thought content normal.         Cognition and Memory: Cognition normal.         Judgment: Judgment normal.         Lab Results   Component Value Date    WBC 6.9 01/21/2025    RBC 4.49 01/21/2025    HGB 13.1 01/21/2025    HCT 41.2 01/21/2025    MCV 92 01/21/2025    MCH 29.2 01/21/2025    MCHC 31.8 01/21/2025    RDW 11.9 01/21/2025     01/21/2025    NEUTRORELPCT 63 01/21/2025    LYMPHORELPCT 28 01/21/2025    MONORELPCT 5 01/21/2025    EOSRELPCT 3 01/21/2025    BASORELPCT 1 01/21/2025    NEUTROABS 4.3 01/21/2025    LYMPHSABS 1.9 01/21/2025    MONOSABS 0.4 01/21/2025    EOSABS 0.2 01/21/2025    BASOSABS 0.1 01/21/2025    NRBC 0.0 12/11/2020     Lab Results   Component Value Date    GLUCOSE 74 02/24/2025    BUN 20 02/24/2025    CREATININE 1.01 (H) 02/24/2025    EGFRIFNONA 106 11/05/2021    EGFRIFAFRI 122 11/05/2021    BCR 20 02/24/2025    K 4.1 02/24/2025    CO2 25 02/24/2025    CALCIUM 9.9 02/24/2025    ALBUMIN 4.3 02/24/2025    AST 23 02/24/2025    ALT 31 02/24/2025      Lab Results   Component Value Date    CHLPL 239 (H) 02/24/2025    TRIG 321 (H) 02/24/2025    HDL 41 02/24/2025    VLDL 58 (H) 02/24/2025     (H) 02/24/2025     Lab Results   Component Value Date    TSH 3.130 12/06/2023     Lab Results   Component Value Date    COLORU Yellow 07/25/2022    CLARITYU Clear 07/25/2022    LEUKOCYTESUR Negative 07/25/2022    BILIRUBINUR Negative 07/25/2022          Assessment    Problem List Items Addressed This Visit       History of repair of mitral valve    Overview   Description: [employer] annuloplasty ring, complex MV repair with significant intra-op and post-op complications         Current Assessment & Plan   Stable.  Continue Metoprolol daily.  Follow up as scheduled with cardiology.         Dyslipidemia    Current Assessment & Plan   Continue Atorvastatin daily.  Continue to work on healthy diet and exercise.         Relevant Orders     Comprehensive Metabolic Panel    Lipid Panel With LDL / HDL Ratio    Anxiety disorder    Current Assessment & Plan   Psychological condition is  stable .  Continue current treatment regimen.  Psychological condition  will be reassessed at the next regular appointment.  Continue Duloxetine twice daily.         Vitamin B12 deficiency    Relevant Orders    Vitamin B12 & Folate    Uterine fibroid    Current Assessment & Plan   Schedule a follow up appointment with GYN.         Recurrent major depressive disorder, in full remission    Current Assessment & Plan   Patient's depression is a recurrent episode that is moderate without psychosis. Depression is in full remission and stable.    Plan:   Continue current medication therapy   Continue Duloxetine twice daily.    Followup at the next regular appointment.          Diarrhea - Primary    Current Assessment & Plan   Return stool samples.  Make sure drinking adequate liquids.  Try over the counter MiraLAX and see if helps empty bowels more completely due to some hard bowel movements.         Relevant Orders    Clostridioides difficile EIA - Stool, Per Rectum    Stool Culture (Reference Lab) - Stool, Per Rectum    CBC & Differential    Comprehensive Metabolic Panel    TSH Rfx On Abnormal To Free T4    Ambulatory Referral to Gastroenterology        Return in about 2 months (around 7/16/2025) for Medicare Wellness, Recheck.or sooner if symptoms persist or worsen.

## 2025-05-17 LAB
ALBUMIN SERPL-MCNC: 4.1 G/DL (ref 3.5–5.2)
ALBUMIN/GLOB SERPL: 1.8 G/DL
ALP SERPL-CCNC: 100 U/L (ref 39–117)
ALT SERPL-CCNC: 30 U/L (ref 1–33)
AST SERPL-CCNC: 28 U/L (ref 1–32)
BASOPHILS # BLD AUTO: 0.08 10*3/MM3 (ref 0–0.2)
BASOPHILS NFR BLD AUTO: 1.3 % (ref 0–1.5)
BILIRUB SERPL-MCNC: 0.2 MG/DL (ref 0–1.2)
BUN SERPL-MCNC: 17 MG/DL (ref 6–20)
BUN/CREAT SERPL: 16.7 (ref 7–25)
CALCIUM SERPL-MCNC: 9 MG/DL (ref 8.6–10.5)
CHLORIDE SERPL-SCNC: 106 MMOL/L (ref 98–107)
CHOLEST SERPL-MCNC: 217 MG/DL (ref 0–200)
CO2 SERPL-SCNC: 24.7 MMOL/L (ref 22–29)
CREAT SERPL-MCNC: 1.02 MG/DL (ref 0.57–1)
EGFRCR SERPLBLD CKD-EPI 2021: 70.6 ML/MIN/1.73
EOSINOPHIL # BLD AUTO: 0.13 10*3/MM3 (ref 0–0.4)
EOSINOPHIL NFR BLD AUTO: 2.1 % (ref 0.3–6.2)
ERYTHROCYTE [DISTWIDTH] IN BLOOD BY AUTOMATED COUNT: 12.1 % (ref 12.3–15.4)
FOLATE SERPL-MCNC: >20 NG/ML (ref 4.78–24.2)
GLOBULIN SER CALC-MCNC: 2.3 GM/DL
GLUCOSE SERPL-MCNC: 75 MG/DL (ref 65–99)
HCT VFR BLD AUTO: 37.2 % (ref 34–46.6)
HDLC SERPL-MCNC: 37 MG/DL (ref 40–60)
HGB BLD-MCNC: 12.4 G/DL (ref 12–15.9)
IMM GRANULOCYTES # BLD AUTO: 0.01 10*3/MM3 (ref 0–0.05)
IMM GRANULOCYTES NFR BLD AUTO: 0.2 % (ref 0–0.5)
LDLC SERPL CALC-MCNC: 123 MG/DL (ref 0–100)
LDLC/HDLC SERPL: 3.12 {RATIO}
LYMPHOCYTES # BLD AUTO: 1.68 10*3/MM3 (ref 0.7–3.1)
LYMPHOCYTES NFR BLD AUTO: 27.2 % (ref 19.6–45.3)
MCH RBC QN AUTO: 30.7 PG (ref 26.6–33)
MCHC RBC AUTO-ENTMCNC: 33.3 G/DL (ref 31.5–35.7)
MCV RBC AUTO: 92.1 FL (ref 79–97)
MONOCYTES # BLD AUTO: 0.42 10*3/MM3 (ref 0.1–0.9)
MONOCYTES NFR BLD AUTO: 6.8 % (ref 5–12)
NEUTROPHILS # BLD AUTO: 3.86 10*3/MM3 (ref 1.7–7)
NEUTROPHILS NFR BLD AUTO: 62.4 % (ref 42.7–76)
NRBC BLD AUTO-RTO: 0 /100 WBC (ref 0–0.2)
PLATELET # BLD AUTO: 231 10*3/MM3 (ref 140–450)
POTASSIUM SERPL-SCNC: 4.5 MMOL/L (ref 3.5–5.2)
PROT SERPL-MCNC: 6.4 G/DL (ref 6–8.5)
RBC # BLD AUTO: 4.04 10*6/MM3 (ref 3.77–5.28)
SODIUM SERPL-SCNC: 140 MMOL/L (ref 136–145)
TRIGL SERPL-MCNC: 322 MG/DL (ref 0–150)
TSH SERPL DL<=0.005 MIU/L-ACNC: 3.63 UIU/ML (ref 0.27–4.2)
VIT B12 SERPL-MCNC: 801 PG/ML (ref 211–946)
VLDLC SERPL CALC-MCNC: 57 MG/DL (ref 5–40)
WBC # BLD AUTO: 6.18 10*3/MM3 (ref 3.4–10.8)

## 2025-05-17 NOTE — ASSESSMENT & PLAN NOTE
Psychological condition is  stable .  Continue current treatment regimen.  Psychological condition  will be reassessed at the next regular appointment.  Continue Duloxetine twice daily.

## 2025-05-17 NOTE — ASSESSMENT & PLAN NOTE
Return stool samples.  Make sure drinking adequate liquids.  Try over the counter MiraLAX and see if helps empty bowels more completely due to some hard bowel movements.

## 2025-05-17 NOTE — ASSESSMENT & PLAN NOTE
Patient's depression is a recurrent episode that is moderate without psychosis. Depression is in full remission and stable.    Plan:   Continue current medication therapy   Continue Duloxetine twice daily.    Followup at the next regular appointment.

## 2025-05-19 ENCOUNTER — LAB (OUTPATIENT)
Facility: HOSPITAL | Age: 45
End: 2025-05-19
Payer: MEDICARE

## 2025-05-19 PROCEDURE — 87324 CLOSTRIDIUM AG IA: CPT | Performed by: NURSE PRACTITIONER

## 2025-05-19 PROCEDURE — 87427 SHIGA-LIKE TOXIN AG IA: CPT | Performed by: NURSE PRACTITIONER

## 2025-05-19 PROCEDURE — 87045 FECES CULTURE AEROBIC BACT: CPT | Performed by: NURSE PRACTITIONER

## 2025-05-19 PROCEDURE — 87046 STOOL CULTR AEROBIC BACT EA: CPT | Performed by: NURSE PRACTITIONER

## 2025-05-20 LAB — C DIFF TOX A+B STL QL IA: NEGATIVE

## 2025-05-23 LAB
BACTERIA SPEC CULT: NORMAL
BACTERIA SPEC CULT: NORMAL
CAMPYLOBACTER STL CULT: NORMAL
E COLI SXT STL QL IA: NEGATIVE
SALM + SHIG STL CULT: NORMAL

## 2025-06-04 DIAGNOSIS — M79.641 PAIN IN RIGHT HAND: ICD-10-CM

## 2025-06-04 DIAGNOSIS — G81.10 SPASTIC HEMIPLEGIA AFFECTING DOMINANT SIDE: ICD-10-CM

## 2025-06-04 DIAGNOSIS — M62.89 MUSCLE HYPERTONICITY: ICD-10-CM

## 2025-06-05 RX ORDER — BACLOFEN 10 MG/1
10 TABLET ORAL 2 TIMES DAILY PRN
Qty: 180 TABLET | Refills: 0 | Status: SHIPPED | OUTPATIENT
Start: 2025-06-05

## 2025-06-26 ENCOUNTER — OFFICE VISIT (OUTPATIENT)
Dept: FAMILY MEDICINE CLINIC | Facility: CLINIC | Age: 45
End: 2025-06-26
Payer: MEDICARE

## 2025-06-26 VITALS
BODY MASS INDEX: 24.23 KG/M2 | DIASTOLIC BLOOD PRESSURE: 78 MMHG | OXYGEN SATURATION: 98 % | SYSTOLIC BLOOD PRESSURE: 104 MMHG | TEMPERATURE: 98.6 F | HEART RATE: 108 BPM | HEIGHT: 69 IN | WEIGHT: 163.6 LBS

## 2025-06-26 DIAGNOSIS — E86.0 DEHYDRATION: ICD-10-CM

## 2025-06-26 DIAGNOSIS — N30.01 ACUTE CYSTITIS WITH HEMATURIA: ICD-10-CM

## 2025-06-26 DIAGNOSIS — M54.50 ACUTE LEFT-SIDED LOW BACK PAIN WITHOUT SCIATICA: ICD-10-CM

## 2025-06-26 DIAGNOSIS — R23.2 HOT FLASHES: Primary | ICD-10-CM

## 2025-06-26 DIAGNOSIS — R00.0 TACHYCARDIA: ICD-10-CM

## 2025-06-26 LAB
BILIRUB BLD-MCNC: ABNORMAL MG/DL
CLARITY, POC: ABNORMAL
COLOR UR: ABNORMAL
EXPIRATION DATE: ABNORMAL
GLUCOSE UR STRIP-MCNC: NEGATIVE MG/DL
KETONES UR QL: NEGATIVE
LEUKOCYTE EST, POC: ABNORMAL
Lab: ABNORMAL
NITRITE UR-MCNC: POSITIVE MG/ML
PH UR: 6 [PH] (ref 5–8)
PROT UR STRIP-MCNC: ABNORMAL MG/DL
RBC # UR STRIP: ABNORMAL /UL
SP GR UR: 1.02 (ref 1–1.03)
UROBILINOGEN UR QL: NORMAL

## 2025-06-26 PROCEDURE — 1125F AMNT PAIN NOTED PAIN PRSNT: CPT | Performed by: NURSE PRACTITIONER

## 2025-06-26 PROCEDURE — 81003 URINALYSIS AUTO W/O SCOPE: CPT | Performed by: NURSE PRACTITIONER

## 2025-06-26 PROCEDURE — 1159F MED LIST DOCD IN RCRD: CPT | Performed by: NURSE PRACTITIONER

## 2025-06-26 PROCEDURE — 99214 OFFICE O/P EST MOD 30 MIN: CPT | Performed by: NURSE PRACTITIONER

## 2025-06-26 PROCEDURE — 1160F RVW MEDS BY RX/DR IN RCRD: CPT | Performed by: NURSE PRACTITIONER

## 2025-06-26 RX ORDER — NITROFURANTOIN 25; 75 MG/1; MG/1
100 CAPSULE ORAL 2 TIMES DAILY
Qty: 20 CAPSULE | Refills: 0 | Status: SHIPPED | OUTPATIENT
Start: 2025-06-26

## 2025-06-26 NOTE — PROGRESS NOTES
"Chief Complaint  Vomiting, Dizziness, and Pain (Pain left lower back and hip after vomiting for past several days- was in the pool drinking and shortly afterward starting vomiting)    Subjective        Ania Reyes presents to [facility]  Vomiting   Associated symptoms include dizziness.   Dizziness  Symptoms: vomiting    Pain  Symptoms: vomiting      Patient and mother present in room, mother is reporting for patient due to preexisting conditions which hinders patient's communication and memory. Patient states she is having some acute concerns since Saturday when she swam for a few hours and had some alcoholic drinks. She began to vomit on Saturday evening after that. She began to have dizziness around the same time. She states the dizziness persisted and has been intermittent, she is also having hot flashes, sweating. She developed a fever on Sunday and Monday and has since resolved. She noticed some left lower back pain on Sunday and that persists, and the area does seem swollen when compared to the right side. She denies taking any otc meds for pain. She denies trouble urinating. She denies shortness of breath but has noticed some left upper chest/side pain. She has a history of stroke. Not currently on any blood thinners. She has right side weakness.   Objective   Vital Signs:  /78 (BP Location: Left arm, Patient Position: Sitting, Cuff Size: Adult)   Pulse 108   Temp 98.6 °F (37 °C) (Oral)   Ht 175.3 cm (69\")   Wt 74.2 kg (163 lb 9.6 oz)   SpO2 98%   BMI 24.16 kg/m²   Estimated body mass index is 24.16 kg/m² as calculated from the following:    Height as of this encounter: 175.3 cm (69\").    Weight as of this encounter: 74.2 kg (163 lb 9.6 oz).    BMI is within normal parameters. No other follow-up for BMI required.      Physical Exam  Constitutional:       Appearance: Normal appearance.   HENT:      Head: Normocephalic.   Cardiovascular:      Rate and Rhythm: " Normal rate and regular rhythm.   Pulmonary:      Effort: Pulmonary effort is normal.      Breath sounds: Normal breath sounds.   Musculoskeletal:         General: Normal range of motion.   Skin:     General: Skin is warm and dry.   Neurological:      General: No focal deficit present.      Mental Status: She is alert and oriented to person, place, and time.   Psychiatric:         Mood and Affect: Mood normal.        Result Review :                Assessment and Plan   Diagnoses and all orders for this visit:    1. Hot flashes (Primary)    2. Dehydration  -     POCT urinalysis dipstick, automated    3. Tachycardia  -     CBC w AUTO Differential  -     Comprehensive metabolic panel    4. Acute left-sided low back pain without sciatica  -     XR Spine Lumbar AP & Lateral; Future    5. Acute cystitis with hematuria  -     nitrofurantoin, macrocrystal-monohydrate, (Macrobid) 100 MG capsule; Take 1 capsule by mouth 2 (Two) Times a Day.  Dispense: 20 capsule; Refill: 0  -     Urine Culture - Urine, Urine, Clean Catch; Future  -     Urine Culture - Urine, Urine, Clean Catch    Patient encouraged to drink electrolyte solution to help with symptoms.     She is to present for further evaluation to nearest ER if not feeling improved over the next 12-24 hours with oral rehydration.          Follow Up   Return if symptoms worsen or fail to improve.  Patient was given instructions and counseling regarding her condition or for health maintenance advice. Please see specific information pulled into the AVS if appropriate.

## 2025-06-27 ENCOUNTER — RESULTS FOLLOW-UP (OUTPATIENT)
Dept: FAMILY MEDICINE CLINIC | Facility: CLINIC | Age: 45
End: 2025-06-27
Payer: MEDICARE

## 2025-06-27 ENCOUNTER — HOSPITAL ENCOUNTER (OUTPATIENT)
Dept: GENERAL RADIOLOGY | Facility: HOSPITAL | Age: 45
Discharge: HOME | End: 2025-06-27
Payer: MEDICARE

## 2025-06-27 ENCOUNTER — HOSPITAL ENCOUNTER (EMERGENCY)
Facility: HOSPITAL | Age: 45
Discharge: HOME | End: 2025-06-27
Attending: EMERGENCY MEDICINE
Payer: MEDICARE

## 2025-06-27 ENCOUNTER — APPOINTMENT (OUTPATIENT)
Dept: CT IMAGING | Facility: HOSPITAL | Age: 45
End: 2025-06-27
Payer: MEDICARE

## 2025-06-27 VITALS
HEIGHT: 69 IN | DIASTOLIC BLOOD PRESSURE: 57 MMHG | OXYGEN SATURATION: 98 % | RESPIRATION RATE: 20 BRPM | BODY MASS INDEX: 24.23 KG/M2 | SYSTOLIC BLOOD PRESSURE: 112 MMHG | WEIGHT: 163.58 LBS | HEART RATE: 96 BPM | TEMPERATURE: 98.3 F

## 2025-06-27 DIAGNOSIS — M54.50 ACUTE LEFT-SIDED LOW BACK PAIN WITHOUT SCIATICA: ICD-10-CM

## 2025-06-27 DIAGNOSIS — N39.0 URINARY TRACT INFECTION WITHOUT HEMATURIA, SITE UNSPECIFIED: ICD-10-CM

## 2025-06-27 DIAGNOSIS — R74.01 TRANSAMINITIS: Primary | ICD-10-CM

## 2025-06-27 LAB
ALBUMIN SERPL-MCNC: 3.5 G/DL (ref 3.9–4.9)
ALBUMIN SERPL-MCNC: 3.9 G/DL (ref 3.5–5.2)
ALBUMIN/GLOB SERPL: 1.1 G/DL
ALP SERPL-CCNC: 336 IU/L (ref 44–121)
ALP SERPL-CCNC: 349 U/L (ref 39–117)
ALT SERPL W P-5'-P-CCNC: 141 U/L (ref 1–33)
ALT SERPL-CCNC: 133 IU/L (ref 0–32)
ANION GAP SERPL CALCULATED.3IONS-SCNC: 12.6 MMOL/L (ref 5–15)
AST SERPL-CCNC: 127 U/L (ref 1–32)
AST SERPL-CCNC: 130 IU/L (ref 0–40)
BACTERIA UR QL AUTO: ABNORMAL /HPF
BASOPHILS # BLD AUTO: 0.07 10*3/MM3 (ref 0–0.2)
BASOPHILS # BLD AUTO: 0.1 X10E3/UL (ref 0–0.2)
BASOPHILS NFR BLD AUTO: 0 %
BASOPHILS NFR BLD AUTO: 0.6 % (ref 0–1.5)
BILIRUB SERPL-MCNC: 0.4 MG/DL (ref 0–1.2)
BILIRUB SERPL-MCNC: 0.5 MG/DL (ref 0–1.2)
BILIRUB UR QL STRIP: NEGATIVE
BUN SERPL-MCNC: 12.9 MG/DL (ref 6–20)
BUN SERPL-MCNC: 15 MG/DL (ref 6–24)
BUN/CREAT SERPL: 10.9 (ref 7–25)
BUN/CREAT SERPL: 14 (ref 9–23)
CALCIUM SERPL-MCNC: 9.2 MG/DL (ref 8.7–10.2)
CALCIUM SPEC-SCNC: 9.2 MG/DL (ref 8.6–10.5)
CHLORIDE SERPL-SCNC: 101 MMOL/L (ref 98–107)
CHLORIDE SERPL-SCNC: 99 MMOL/L (ref 96–106)
CLARITY UR: ABNORMAL
CO2 SERPL-SCNC: 20 MMOL/L (ref 20–29)
CO2 SERPL-SCNC: 23.4 MMOL/L (ref 22–29)
COLOR UR: YELLOW
CREAT SERPL-MCNC: 1.09 MG/DL (ref 0.57–1)
CREAT SERPL-MCNC: 1.18 MG/DL (ref 0.57–1)
DEPRECATED RDW RBC AUTO: 43.4 FL (ref 37–54)
EGFRCR SERPLBLD CKD-EPI 2021: 59.3 ML/MIN/1.73
EGFRCR SERPLBLD CKD-EPI 2021: 65 ML/MIN/1.73
EOSINOPHIL # BLD AUTO: 0.1 X10E3/UL (ref 0–0.4)
EOSINOPHIL # BLD AUTO: 0.16 10*3/MM3 (ref 0–0.4)
EOSINOPHIL NFR BLD AUTO: 1 %
EOSINOPHIL NFR BLD AUTO: 1.3 % (ref 0.3–6.2)
ERYTHROCYTE [DISTWIDTH] IN BLOOD BY AUTOMATED COUNT: 11.9 % (ref 11.7–15.4)
ERYTHROCYTE [DISTWIDTH] IN BLOOD BY AUTOMATED COUNT: 12.8 % (ref 12.3–15.4)
GLOBULIN SER CALC-MCNC: 2.7 G/DL (ref 1.5–4.5)
GLOBULIN UR ELPH-MCNC: 3.4 GM/DL
GLUCOSE SERPL-MCNC: 104 MG/DL (ref 70–99)
GLUCOSE SERPL-MCNC: 89 MG/DL (ref 65–99)
GLUCOSE UR STRIP-MCNC: NEGATIVE MG/DL
HAV IGM SERPL QL IA: NORMAL
HBV CORE IGM SERPL QL IA: NORMAL
HBV SURFACE AG SERPL QL IA: NORMAL
HCG INTACT+B SERPL-ACNC: <0.5 MIU/ML
HCT VFR BLD AUTO: 34.7 % (ref 34–46.6)
HCT VFR BLD AUTO: 37.1 % (ref 34–46.6)
HCV AB SER QL: NORMAL
HGB BLD-MCNC: 10.9 G/DL (ref 12–15.9)
HGB BLD-MCNC: 12 G/DL (ref 11.1–15.9)
HGB UR QL STRIP.AUTO: ABNORMAL
HOLD SPECIMEN: NORMAL
HOLD SPECIMEN: NORMAL
HYALINE CASTS UR QL AUTO: ABNORMAL /LPF
IMM GRANULOCYTES # BLD AUTO: 0.06 10*3/MM3 (ref 0–0.05)
IMM GRANULOCYTES # BLD AUTO: 0.1 X10E3/UL (ref 0–0.1)
IMM GRANULOCYTES NFR BLD AUTO: 0.5 % (ref 0–0.5)
IMM GRANULOCYTES NFR BLD AUTO: 1 %
KETONES UR QL STRIP: NEGATIVE
LEUKOCYTE ESTERASE UR QL STRIP.AUTO: ABNORMAL
LIPASE SERPL-CCNC: 16 U/L (ref 13–60)
LYMPHOCYTES # BLD AUTO: 0.7 X10E3/UL (ref 0.7–3.1)
LYMPHOCYTES # BLD AUTO: 1.38 10*3/MM3 (ref 0.7–3.1)
LYMPHOCYTES NFR BLD AUTO: 11.2 % (ref 19.6–45.3)
LYMPHOCYTES NFR BLD AUTO: 6 %
MCH RBC QN AUTO: 29.2 PG (ref 26.6–33)
MCH RBC QN AUTO: 30 PG (ref 26.6–33)
MCHC RBC AUTO-ENTMCNC: 31.4 G/DL (ref 31.5–35.7)
MCHC RBC AUTO-ENTMCNC: 32.3 G/DL (ref 31.5–35.7)
MCV RBC AUTO: 93 FL (ref 79–97)
MCV RBC AUTO: 93 FL (ref 79–97)
MONOCYTES # BLD AUTO: 0.8 X10E3/UL (ref 0.1–0.9)
MONOCYTES # BLD AUTO: 0.99 10*3/MM3 (ref 0.1–0.9)
MONOCYTES NFR BLD AUTO: 7 %
MONOCYTES NFR BLD AUTO: 8 % (ref 5–12)
NEUTROPHILS # BLD AUTO: 10.7 X10E3/UL (ref 1.4–7)
NEUTROPHILS NFR BLD AUTO: 78.4 % (ref 42.7–76)
NEUTROPHILS NFR BLD AUTO: 85 %
NEUTROPHILS NFR BLD AUTO: 9.65 10*3/MM3 (ref 1.7–7)
NITRITE UR QL STRIP: NEGATIVE
NRBC BLD AUTO-RTO: 0 /100 WBC (ref 0–0.2)
PH UR STRIP.AUTO: 6.5 [PH] (ref 5–8)
PLATELET # BLD AUTO: 285 X10E3/UL (ref 150–450)
PLATELET # BLD AUTO: 301 10*3/MM3 (ref 140–450)
PMV BLD AUTO: 10.2 FL (ref 6–12)
POTASSIUM SERPL-SCNC: 3.2 MMOL/L (ref 3.5–5.2)
POTASSIUM SERPL-SCNC: 3.3 MMOL/L (ref 3.5–5.2)
PROT SERPL-MCNC: 6.2 G/DL (ref 6–8.5)
PROT SERPL-MCNC: 7.3 G/DL (ref 6–8.5)
PROT UR QL STRIP: ABNORMAL
RBC # BLD AUTO: 3.73 10*6/MM3 (ref 3.77–5.28)
RBC # BLD AUTO: 4 X10E6/UL (ref 3.77–5.28)
RBC # UR STRIP: ABNORMAL /HPF
REF LAB TEST METHOD: ABNORMAL
SODIUM SERPL-SCNC: 137 MMOL/L (ref 136–145)
SODIUM SERPL-SCNC: 138 MMOL/L (ref 134–144)
SP GR UR STRIP: >1.03 (ref 1–1.03)
SQUAMOUS #/AREA URNS HPF: ABNORMAL /HPF
UROBILINOGEN UR QL STRIP: ABNORMAL
WBC # BLD AUTO: 12.4 X10E3/UL (ref 3.4–10.8)
WBC # UR STRIP: ABNORMAL /HPF
WBC NRBC COR # BLD AUTO: 12.31 10*3/MM3 (ref 3.4–10.8)
WHOLE BLOOD HOLD COAG: NORMAL
WHOLE BLOOD HOLD SPECIMEN: NORMAL

## 2025-06-27 PROCEDURE — 36415 COLL VENOUS BLD VENIPUNCTURE: CPT

## 2025-06-27 PROCEDURE — 25010000002 CEFTRIAXONE PER 250 MG: Performed by: EMERGENCY MEDICINE

## 2025-06-27 PROCEDURE — 72100 X-RAY EXAM L-S SPINE 2/3 VWS: CPT

## 2025-06-27 PROCEDURE — 84702 CHORIONIC GONADOTROPIN TEST: CPT

## 2025-06-27 PROCEDURE — 81001 URINALYSIS AUTO W/SCOPE: CPT | Performed by: EMERGENCY MEDICINE

## 2025-06-27 PROCEDURE — 83690 ASSAY OF LIPASE: CPT | Performed by: EMERGENCY MEDICINE

## 2025-06-27 PROCEDURE — 80053 COMPREHEN METABOLIC PANEL: CPT | Performed by: EMERGENCY MEDICINE

## 2025-06-27 PROCEDURE — 96365 THER/PROPH/DIAG IV INF INIT: CPT

## 2025-06-27 PROCEDURE — 74177 CT ABD & PELVIS W/CONTRAST: CPT

## 2025-06-27 PROCEDURE — 99285 EMERGENCY DEPT VISIT HI MDM: CPT

## 2025-06-27 PROCEDURE — 85025 COMPLETE CBC W/AUTO DIFF WBC: CPT

## 2025-06-27 PROCEDURE — 25510000001 IOPAMIDOL PER 1 ML: Performed by: EMERGENCY MEDICINE

## 2025-06-27 PROCEDURE — 80074 ACUTE HEPATITIS PANEL: CPT | Performed by: EMERGENCY MEDICINE

## 2025-06-27 RX ORDER — SODIUM CHLORIDE 0.9 % (FLUSH) 0.9 %
10 SYRINGE (ML) INJECTION AS NEEDED
Status: DISCONTINUED | OUTPATIENT
Start: 2025-06-27 | End: 2025-06-27 | Stop reason: HOSPADM

## 2025-06-27 RX ORDER — CEPHALEXIN 500 MG/1
500 CAPSULE ORAL 3 TIMES DAILY
Qty: 15 CAPSULE | Refills: 0 | Status: SHIPPED | OUTPATIENT
Start: 2025-06-27 | End: 2025-07-02

## 2025-06-27 RX ORDER — IOPAMIDOL 755 MG/ML
100 INJECTION, SOLUTION INTRAVASCULAR
Status: COMPLETED | OUTPATIENT
Start: 2025-06-27 | End: 2025-06-27

## 2025-06-27 RX ADMIN — CEFTRIAXONE SODIUM 1000 MG: 1 INJECTION, POWDER, FOR SOLUTION INTRAMUSCULAR; INTRAVENOUS at 19:23

## 2025-06-27 RX ADMIN — IOPAMIDOL 100 ML: 755 INJECTION, SOLUTION INTRAVENOUS at 17:52

## 2025-06-27 NOTE — ED PROVIDER NOTES
Time: 4:21 PM EDT  Date of encounter:  6/27/2025  Independent Historian/Clinical History and Information was obtained by:   Patient and Family    History is limited by: N/A    Chief Complaint   Patient presents with    Abnormal Lab     Pt was seen by PCP yesterday, liver enzymes hgh and pt had UTI         History of Present Illness:  Patient is a 44 y.o. year old female who presents to the emergency department for evaluation of abdominal and left flank pain ongoing for 6 days.  Patient reports she was diagnosed with UTI yesterday at primary care provider office and also was told she had high liver enzymes on lab work yesterday. Patient reports she believes she had a fever at home prior to arrival. Reports chills and diaphoresis. One episode of vomiting.     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/11/2015    Description: 02- - (N) Coronaries - LVEF 55% - Severe prolapse of the anterior & posterior mitral leaflet with severe MR,    Functional cardiac murmur 04/14/2014    Gingival enlargement 11/17/2016    Gum hypertrophy     Heart failure 08/13/2021    NYHA class 3 NYHA class 3 NYHA class 3 NYHA class 3    High risk medication use     History of acute sinusitis     History of dizziness     History of echocardiogram     History of hyperparathyroidism     History of kidney stones     Bilateral    History of low back pain     History of mitral valve repair     Micheal  [employer] annuloplasty ring, complex MV repair with significant intra-op and post op complications    History of nephrolithiasis     History of stroke     LMCA; right hemiplegia    History of transesophageal echocardiography (PRASHANT)     Hydroureteronephrosis 12/21/2020 12/21/20 CT abd/pelvis: 4-5 mm obstructing stone located in the distal left ureter with mild-moderate left hydroureteronephrosis; tiny nonobstructing stones elsewhere in both kidneys    Kidney stone     3mm     Lactase deficiency     Lactose intolerance 01/2010    Left flank pain 12/07/2020    Left nephrolithiasis 08/12/2019    Methicillin resistant Staphylococcus aureus infection 03/13/2015    No A2K system hx - now +MRSA sputum on 3/13/2015    Mitral regurgitation     Mitral valve prolapse     Muscle hypertonicity     Need for SBE (subacute bacterial endocarditis) prophylaxis 08/12/2019    Osteopenia     Pain in right hand     Pain, wrist joint     Parathyroid adenoma     PMS (premenstrual syndrome)     Positive depression screening     Primary hyperparathyroidism     Right arm pain     Right hemiplegia 05/28/2015    Seizures     Sinus tachycardia     Spastic hemiplegia affecting dominant side     Stroke 05/2015    Vitamin D deficiency      Past Surgical History:   Procedure Laterality Date    CARDIAC SURGERY  2015    CYSTOSCOPY URETEROSCOPY LASER LITHOTRIPSY      KNEE SURGERY Left     MITRAL VALVE REPAIR/REPLACEMENT  03/2015    annuloplasty ring, complex MV repair with significant intra-op and post-op complications    OTHER SURGICAL HISTORY      Selective Arterial Catheterization     PARATHYROID GLAND SURGERY  2014    Parathyroid resection     Family History   Problem Relation Age of Onset    Depression Mother     Anxiety disorder Mother     Coronary artery disease Mother     Glaucoma Mother     Nephrolithiasis Mother     Heart disease Mother     Osteoporosis Mother     No Known Problems Father     Anxiety disorder Sister     Colon cancer  Maternal Grandmother         diagnosed in her 60's    Breast cancer Maternal Grandmother     Ovarian cancer Maternal Grandmother     Coronary artery disease Maternal Grandfather     Heart attack Maternal Grandfather          at age 54 years    Breast cancer Maternal Aunt         diagnosed in 40s    Ovarian cancer Maternal Aunt     Osteoporosis Maternal Aunt     Breast cancer Maternal Aunt     No Known Problems Other     Colon cancer Maternal Great-Grandfather        Home Medications:  Prior to Admission medications    Medication Sig Start Date End Date Taking? Authorizing Provider   aspirin 81 MG tablet Take 1 tablet by mouth Daily.    Fazal Blanc MD   atorvastatin (LIPITOR) 20 MG tablet Take 1 tablet by mouth Daily.    Fazal Blanc MD   baclofen (LIORESAL) 10 MG tablet TAKE 1 TABLET BY MOUTH TWICE DAILY AS NEEDED FOR MUSCLE SPASMS 25   Cinthia Mark APRN   Cyanocobalamin (Vitamin B-12) 5000 MCG tablet dispersible Place 1 tablet on the tongue Every 30 (Thirty) Days.    Fazal Blanc MD   diazePAM (DIASTAT ACUDIAL) 20 MG rectal kit INSERT 12.5MG INTO THE RECTUM ONCE FOR 1 DOSE FOR GENERALIZED SEIZURE LASTING OVER 3 MINUTES. 21   Fazal Blanc MD   diclofenac (VOLTAREN) 50 MG EC tablet TAKE 1 TABLET BY MOUTH THREE TIMES DAILY AS NEEDED FOR HAND PAIN (TAKE WITH FOOD) 25   Cinthia Mark APRN   DULoxetine (CYMBALTA) 30 MG capsule Take 1 capsule by mouth Every Night. in addition to 60 mg in morning 25   Cinthia Mark APRN   DULoxetine (CYMBALTA) 60 MG capsule Take 1 capsule by mouth Daily. In addition to 30 mg nightly 25   Cinthia Mark APRN   famotidine (PEPCID) 20 MG tablet Take 1 tablet by mouth Daily.    Fazal Blanc MD   gabapentin (NEURONTIN) 600 MG tablet Take 1 tablet by mouth 3 (Three) Times a Day. 19   Fazal Blanc MD   levETIRAcetam (KEPPRA) 500 MG tablet Take 1 tablet by mouth 2 (Two) Times a Day. 22   [place]  "MD Fazal   metoprolol succinate XL (TOPROL-XL) 25 MG 24 hr tablet Take 1 tablet by mouth Daily. 7/1/19   Fazal Blanc MD   Multiple Vitamins-Minerals (MULTIVITAMIN ADULTS PO) Take 1 tablet by mouth Daily.    Fazal Blanc MD   nitrofurantoin, macrocrystal-monohydrate, (Macrobid) 100 MG capsule Take 1 capsule by mouth 2 (Two) Times a Day. 6/26/25   Katie Greene APRN   potassium chloride (K-DUR) 10 MEQ CR tablet Take 1 tablet by mouth Daily.    Fazal Blanc MD   Vitamin D, Cholecalciferol, 25 MCG (1000 UT) capsule Take 2 capsules by mouth Daily. 7/17/24   Cinthia Mark APRN        Social History:   Social History     Tobacco Use    Smoking status: Never     Passive exposure: Never    Smokeless tobacco: Never   Vaping Use    Vaping status: Never Used   Substance Use Topics    Alcohol use: Yes     Alcohol/week: 1.0 standard drink of alcohol     Types: 1 Drinks containing 0.5 oz of alcohol per week     Comment: social drinker    Drug use: Never         Review of Systems:  Review of Systems   Constitutional:  Negative for chills and fever.   HENT:  Negative for congestion, rhinorrhea and sore throat.    Eyes:  Negative for pain and visual disturbance.   Respiratory:  Negative for apnea, cough, chest tightness and shortness of breath.    Cardiovascular:  Negative for chest pain and palpitations.   Gastrointestinal:  Negative for abdominal pain, diarrhea, nausea and vomiting.   Genitourinary:  Positive for dysuria. Negative for difficulty urinating.   Musculoskeletal:  Negative for joint swelling and myalgias.   Skin:  Negative for color change.   Neurological:  Negative for seizures and headaches.   Psychiatric/Behavioral: Negative.     All other systems reviewed and are negative.       Physical Exam:  /57   Pulse 96   Temp 98.3 °F (36.8 °C)   Resp 20   Ht 175.3 cm (69\")   Wt 74.2 kg (163 lb 9.3 oz)   LMP 06/13/2025   SpO2 98%   BMI 24.16 kg/m²         Physical " Exam  Vitals and nursing note reviewed.   Constitutional:       General: She is not in acute distress.     Appearance: Normal appearance. She is not toxic-appearing.   HENT:      Head: Normocephalic and atraumatic.      Jaw: There is normal jaw occlusion.   Eyes:      General: Lids are normal.      Extraocular Movements: Extraocular movements intact.      Conjunctiva/sclera: Conjunctivae normal.      Pupils: Pupils are equal, round, and reactive to light.   Cardiovascular:      Rate and Rhythm: Normal rate and regular rhythm.      Pulses: Normal pulses.      Heart sounds: Normal heart sounds.   Pulmonary:      Effort: Pulmonary effort is normal. No respiratory distress.      Breath sounds: Normal breath sounds. No wheezing or rhonchi.   Abdominal:      General: Abdomen is flat.      Palpations: Abdomen is soft.      Tenderness: There is no abdominal tenderness. There is left CVA tenderness. There is no guarding or rebound.   Musculoskeletal:         General: Normal range of motion.      Cervical back: Normal range of motion and neck supple.      Right lower leg: No edema.      Left lower leg: No edema.   Skin:     General: Skin is warm and dry.   Neurological:      Mental Status: She is alert and oriented to person, place, and time. Mental status is at baseline.   Psychiatric:         Mood and Affect: Mood normal.                            Medical Decision Making:      Comorbidities that affect care:    Seizure, chronic pain    External Notes reviewed:    Previous Clinic Note: Family medicine office visit for general medical management      The following orders were placed and all results were independently analyzed by me:  Orders Placed This Encounter   Procedures    CT Abdomen Pelvis With Contrast    [place] Draw    Comprehensive Metabolic Panel    Lipase    Urinalysis With Microscopic If Indicated (No Culture) - Urine, Clean Catch    hCG, Quantitative, Pregnancy    CBC Auto Differential    Hepatitis Panel, Acute     Urinalysis, Microscopic Only - Urine, Clean Catch    NPO Diet NPO Type: Strict NPO    Undress & Gown    Insert Peripheral IV    CBC & Differential    Green Top (Gel)    Lavender Top    Gold Top - SST    Light Blue Top       Medications Given in the Emergency Department:  Medications   sodium chloride 0.9 % flush 10 mL (has no administration in time range)   iopamidol (ISOVUE-370) 76 % injection 100 mL (100 mL Intravenous Given 6/27/25 1752)   cefTRIAXone (ROCEPHIN) 1,000 mg in sodium chloride 0.9 % 100 mL IVPB-VTB (0 mg Intravenous Stopped 6/27/25 2005)        ED Course:    The patient was initially evaluated in the triage area where orders were placed. The patient was later dispositioned by Regino Salazar MD.      The patient was advised to stay for completion of workup which includes but is not limited to communication of labs and radiological results, reassessment and plan. The patient was advised that leaving prior to disposition by a provider could result in critical findings that are not communicated to the patient.     ED Course as of 06/27/25 2128 Fri Jun 27, 2025 1620   --- PROVIDER IN TRIAGE NOTE ---    The patient was evaluated by Keisha booth in triage. Orders were placed and the patient is currently awaiting disposition.      [CE]      ED Course User Index  [CE] Keisha Gonzalez APRN       Labs:    Lab Results (last 24 hours)       Procedure Component Value Units Date/Time    CBC & Differential [[id number]]  (Abnormal) Collected: 06/27/25 1545    Specimen: Blood from Arm, Right Updated: 06/27/25 0599    Narrative:      The following orders were created for panel order CBC & Differential.  Procedure                               Abnormality         Status                     ---------                               -----------         ------                     CBC Auto Differential[[id number]]        Abnormal            Final result                 Please view results for these tests on the  individual orders.    Comprehensive Metabolic Panel [[id number]]  (Abnormal) Collected: 06/27/25 1545    Specimen: Blood from Arm, Right Updated: 06/27/25 1621     Glucose 89 mg/dL      BUN 12.9 mg/dL      Creatinine 1.18 mg/dL      Sodium 137 mmol/L      Potassium 3.3 mmol/L      Chloride 101 mmol/L      CO2 23.4 mmol/L      Calcium 9.2 mg/dL      Total Protein 7.3 g/dL      Albumin 3.9 g/dL      ALT (SGPT) 141 U/L      AST (SGOT) 127 U/L      Alkaline Phosphatase 349 U/L      Total Bilirubin 0.5 mg/dL      Globulin 3.4 gm/dL      A/G Ratio 1.1 g/dL      BUN/Creatinine Ratio 10.9     Anion Gap 12.6 mmol/L      eGFR 59.3 mL/min/1.73     Narrative:      GFR Categories in Chronic Kidney Disease (CKD)              GFR Category          GFR (mL/min/1.73)    Interpretation  G1                    90 or greater        Normal or high (1)  G2                    60-89                Mild decrease (1)  G3a                   45-59                Mild to moderate decrease  G3b                   30-44                Moderate to severe decrease  G4                    15-29                Severe decrease  G5                    14 or less           Kidney failure    (1)In the absence of evidence of kidney disease, neither GFR category G1 or G2 fulfill the criteria for CKD.    eGFR calculation 2021 CKD-EPI creatinine equation, which does not include race as a factor    Lipase [[id number]]  (Normal) Collected: 06/27/25 1545    Specimen: Blood from Arm, Right Updated: 06/27/25 1621     Lipase 16 U/L     hCG, Quantitative, Pregnancy [[id number]] Collected: 06/27/25 1545    Specimen: Blood from Arm, Right Updated: 06/27/25 1616     HCG Quantitative <0.50 mIU/mL     Narrative:      HCG Ranges by Gestational Age    Females - non-pregnant premenopausal   </= 1mIU/mL HCG  Females - postmenopausal               </= 7mIU/mL HCG    3 Weeks       5.4   -      72 mIU/mL  4 Weeks      10.2   -     708 mIU/mL  5 Weeks       217   -   8,245 mIU/mL  6  Weeks       152   -  32,177 mIU/mL  7 Weeks     4,059   - 153,767 mIU/mL  8 Weeks    31,366   - 149,094 mIU/mL  9 Weeks    59,109   - 135,901 mIU/mL  10 Weeks   44,186   - 170,409 mIU/mL  12 Weeks   27,107   - 201,615 mIU/mL  14 Weeks   24,302   -  93,646 mIU/mL  15 Weeks   12,540   -  69,747 mIU/mL  16 Weeks    8,904   -  55,332 mIU/mL  17 Weeks    8,240   -  51,793 mIU/mL  18 Weeks    9,649   -  55,271 mIU/mL      CBC Auto Differential [[id number]]  (Abnormal) Collected: 06/27/25 1545    Specimen: Blood from Arm, Right Updated: 06/27/25 1559     WBC 12.31 10*3/mm3      RBC 3.73 10*6/mm3      Hemoglobin 10.9 g/dL      Hematocrit 34.7 %      MCV 93.0 fL      MCH 29.2 pg      MCHC 31.4 g/dL      RDW 12.8 %      RDW-SD 43.4 fl      MPV 10.2 fL      Platelets 301 10*3/mm3      Neutrophil % 78.4 %      Lymphocyte % 11.2 %      Monocyte % 8.0 %      Eosinophil % 1.3 %      Basophil % 0.6 %      Immature Grans % 0.5 %      Neutrophils, Absolute 9.65 10*3/mm3      Lymphocytes, Absolute 1.38 10*3/mm3      Monocytes, Absolute 0.99 10*3/mm3      Eosinophils, Absolute 0.16 10*3/mm3      Basophils, Absolute 0.07 10*3/mm3      Immature Grans, Absolute 0.06 10*3/mm3      nRBC 0.0 /100 WBC     Urinalysis With Microscopic If Indicated (No Culture) - Urine, Clean Catch [[id number]]  (Abnormal) Collected: 06/27/25 1852    Specimen: Urine, Clean Catch Updated: 06/27/25 1925     Color, UA Yellow     Appearance, UA Cloudy     pH, UA 6.5     Specific Gravity, UA >1.030     Glucose, UA Negative     Ketones, UA Negative     Bilirubin, UA Negative     Blood, UA Moderate (2+)     Protein,  mg/dL (2+)     Leuk Esterase, UA Moderate (2+)     Nitrite, UA Negative     Urobilinogen, UA 1.0 E.U./dL    Urinalysis, Microscopic Only - Urine, Clean Catch [[id number]]  (Abnormal) Collected: 06/27/25 1852    Specimen: Urine, Clean Catch Updated: 06/27/25 1942     RBC, UA 6-10 /HPF      WBC, UA 21-50 /HPF      Bacteria, UA Trace /HPF      Squamous  Epithelial Cells, UA 3-6 /HPF      Hyaline Casts, UA None Seen /LPF      Methodology Manual Light Microscopy    Hepatitis Panel, Acute [[id number]] Collected: 06/27/25 1855    Specimen: Blood Updated: 06/27/25 1900             Imaging:    CT Abdomen Pelvis With Contrast  Result Date: 6/27/2025  CT ABDOMEN PELVIS W CONTRAST Date of Exam: 6/27/2025 5:35 PM EDT Indication: left flank pain, abdominal pain, vomiting. Comparison: 11/16/2021 Technique: Axial CT images were obtained of the abdomen and pelvis after the uneventful intravenous administration of iodinated contrast. Reconstructed coronal and sagittal images were also obtained. Automated exposure control and iterative construction methods were used. Findings: LUNG BASES:  Unremarkable without mass or infiltrate. LIVER:  Unremarkable parenchyma without focal lesion. BILIARY/GALLBLADDER:  Unremarkable SPLEEN:  Unremarkable PANCREAS:  Unremarkable ADRENAL:  Unremarkable KIDNEYS: There is a 10 mm calculus within the right renal pelvis without evidence of obstruction. There is right renal pelvis urothelial thickening which may be related to chronic irritation/inflammation secondary to the calculus although ascending urinary tract infection not excluded. Correlate with symptoms and urinalysis. There is a nonobstructive 4 mm calculus in the mid left kidney. No ureteral calculi are present. Unremarkable parenchyma with no solid mass identified. No obstruction.  GASTROINTESTINAL/MESENTERY:  No evidence of obstruction nor inflammation.  MESENTERIC VESSELS:  Patent. AORTA/IVC:  Normal caliber. RETROPERITONEUM/LYMPH NODES:  Unremarkable REPRODUCTIVE:  Unremarkable BLADDER:  Unremarkable OSSEUS STRUCTURES:  Typical for age with no acute process identified.     Impression: 1.There is a 10 mm calculus within the right renal pelvis without evidence of obstruction. There is right renal pelvis urothelial thickening which may be related to chronic irritation/inflammation secondary  to the calculus although ascending urinary tract infection not excluded. Correlate with symptoms and urinalysis. 2.Nonobstructive 4 mm left renal calculus. Electronically Signed: Candido Pugh MD  6/27/2025 6:01 PM EDT  Workstation ID: [id number]        Differential Diagnosis and Discussion:      Metabolic: Differential diagnosis includes but is not limited to hypertension, hyperglycemia, hyperkalemia, hypocalcemia, metabolic acidosis, hypokalemia, hypoglycemia, malnutrition, hypothyroidism, hyperthyroidism, and adrenal insufficiency.     PROCEDURES:    Labs were collected in the emergency department and all labs were reviewed and interpreted by me.  CT scan was performed in the emergency department and the CT scan radiology impression was interpreted by me.    No orders to display        Procedures    MDM  Number of Diagnoses or Management Options  Transaminitis  Urinary tract infection without hematuria, site unspecified  Diagnosis management comments: Insert me this is a 44-year-old female who presents for evaluation of abnormal labs, specifically liver enzymes.  She was also diagnosed with UTI yesterday but complains of the flank pain today.  CBC independently reviewed and interpreted by me and shows no critical abnormalities.  CMP independently reviewed and interpreted by me and shows no critical abnormalities except mild transaminitis.  CT scan of the pelvis reviewed by me does reveal bilateral nephrolithiasis without ureterolithiasis, specifically no obstructive uropathy.  Hepatitis panel pending.  Very strict return to ER and follow-up instructions have been provided to the patient.  Given the patient has flank pain now her antibiotic was changed from Macrobid to cephalexin.                     Patient Care Considerations:    CONSULT: I considered consulting urology, however no obstructive uropathy      Consultants/Shared Management Plan:    None    Social Determinants of Health:    Patient is independent,  reliable, and has access to care.       Disposition and Care Coordination:    Discharged: The patient is suitable and stable for discharge with no need for consideration of admission.    I have explained the patient´s condition, diagnoses and treatment plan based on the information available to me at this time. I have answered questions and addressed any concerns. The patient has a good  understanding of the patient´s diagnosis, condition, and treatment plan as can be expected at this point. The vital signs have been stable. The patient´s condition is stable and appropriate for discharge from the emergency department.      The patient will pursue further outpatient evaluation with the primary care physician or other designated or consulting physician as outlined in the discharge instructions. They are agreeable to this plan of care and follow-up instructions have been explained in detail. The patient has received these instructions in written format and has expressed an understanding of the discharge instructions. The patient is aware that any significant change in condition or worsening of symptoms should prompt an immediate return to this or the closest emergency department or call to 911.  I have explained discharge medications and the need for follow up with the patient/caretakers. This was also printed in the discharge instructions. Patient was discharged with the following medications and follow up:      Medication List        New Prescriptions      cephalexin 500 MG capsule  Commonly known as: KEFLEX  Take 1 capsule by mouth 3 (Three) Times a Day for 5 days.               Where to Get Your Medications        These medications were sent to [facility] - [place], [place] - [address] [facility] - [phone]  - [phone]  [address] [facility] [address]      Phone: [phone]   cephalexin 500 MG capsule      Cinthia Mark, APRN  [address] [facility]  [id number]  [phone]    In 1 week         Final diagnoses:   Transaminitis   Urinary tract infection without hematuria, site unspecified        ED Disposition       ED Disposition   Discharge    Condition   Stable    Comment   --               This medical record created using voice recognition software.             Regino Salazar MD  06/27/25 1482

## 2025-07-01 LAB
BACTERIA UR CULT: ABNORMAL
BACTERIA UR CULT: ABNORMAL
OTHER ANTIBIOTIC SUSC ISLT: ABNORMAL

## 2025-07-01 NOTE — TELEPHONE ENCOUNTER
LMTCB      **HUB/FO** MAY RELAY MESSAGE         Urine culture was positive for E. coli. Continue current antibiotics

## 2025-07-14 ENCOUNTER — TELEPHONE (OUTPATIENT)
Dept: FAMILY MEDICINE CLINIC | Facility: CLINIC | Age: 45
End: 2025-07-14
Payer: MEDICARE

## 2025-07-14 NOTE — TELEPHONE ENCOUNTER
OKAY FOR KELLY TO RELAY    I tried calling and speaking with Haydee, who is on the patients verbal release, but did not get an answer. She had clear identification so I left a detailed message letting her know Cinthia recommend to schedule an appointment to be seen. I let her know she said she saw were she was seen in the ER after her last office visit with Katie for the UTI and that she needs a follow-up on some abnormal labs. I also let her know she said she would also recommend she have a repeat urinalysis due to recurrent symptoms. I advised her to call back and schedule and also let us know if there were any further questions or concerns.

## 2025-07-14 NOTE — TELEPHONE ENCOUNTER
Caller: KATY BRANNON     Relationship: [unfilled]     Best call back number: [phone]     What is your medical concern?   POSSIBLE PAINFUL URINATION    How long has this issue been going on?   3 DAYS    Is your provider already aware of this issue? SHE TREATED THE LAST OCCURANCE    Have you been treated for this issue? YES

## 2025-07-21 ENCOUNTER — OFFICE VISIT (OUTPATIENT)
Dept: FAMILY MEDICINE CLINIC | Facility: CLINIC | Age: 45
End: 2025-07-21
Payer: MEDICARE

## 2025-07-21 VITALS
HEART RATE: 90 BPM | DIASTOLIC BLOOD PRESSURE: 74 MMHG | BODY MASS INDEX: 24.11 KG/M2 | SYSTOLIC BLOOD PRESSURE: 110 MMHG | HEIGHT: 69 IN | OXYGEN SATURATION: 97 % | WEIGHT: 162.8 LBS | TEMPERATURE: 98.2 F

## 2025-07-21 DIAGNOSIS — M62.89 MUSCLE HYPERTONICITY: ICD-10-CM

## 2025-07-21 DIAGNOSIS — Z00.00 ENCOUNTER FOR MEDICARE ANNUAL WELLNESS EXAM: Primary | ICD-10-CM

## 2025-07-21 DIAGNOSIS — R30.9 PAINFUL URINATION: ICD-10-CM

## 2025-07-21 DIAGNOSIS — R61 EXCESSIVE SWEATING: ICD-10-CM

## 2025-07-21 DIAGNOSIS — R56.9 SEIZURE: ICD-10-CM

## 2025-07-21 DIAGNOSIS — F41.9 ANXIETY DISORDER, UNSPECIFIED TYPE: ICD-10-CM

## 2025-07-21 DIAGNOSIS — R19.7 DIARRHEA, UNSPECIFIED TYPE: ICD-10-CM

## 2025-07-21 DIAGNOSIS — E78.5 DYSLIPIDEMIA: ICD-10-CM

## 2025-07-21 DIAGNOSIS — G81.10 SPASTIC HEMIPLEGIA AFFECTING DOMINANT SIDE: ICD-10-CM

## 2025-07-21 DIAGNOSIS — D72.829 LEUKOCYTOSIS, UNSPECIFIED TYPE: ICD-10-CM

## 2025-07-21 DIAGNOSIS — Z12.31 SCREENING MAMMOGRAM FOR BREAST CANCER: ICD-10-CM

## 2025-07-21 DIAGNOSIS — F33.1 MODERATE EPISODE OF RECURRENT MAJOR DEPRESSIVE DISORDER: ICD-10-CM

## 2025-07-21 DIAGNOSIS — N30.01 ACUTE CYSTITIS WITH HEMATURIA: ICD-10-CM

## 2025-07-21 DIAGNOSIS — Z87.440 HISTORY OF UTI: ICD-10-CM

## 2025-07-21 DIAGNOSIS — Z98.890 HISTORY OF REPAIR OF MITRAL VALVE: ICD-10-CM

## 2025-07-21 DIAGNOSIS — M79.641 PAIN IN RIGHT HAND: ICD-10-CM

## 2025-07-21 DIAGNOSIS — R74.8 ELEVATED LIVER ENZYMES: ICD-10-CM

## 2025-07-21 LAB
BILIRUB BLD-MCNC: NEGATIVE MG/DL
CLARITY, POC: ABNORMAL
COLOR UR: ABNORMAL
EXPIRATION DATE: ABNORMAL
GLUCOSE UR STRIP-MCNC: NEGATIVE MG/DL
KETONES UR QL: NEGATIVE
LEUKOCYTE EST, POC: ABNORMAL
Lab: ABNORMAL
NITRITE UR-MCNC: NEGATIVE MG/ML
PH UR: 6 [PH] (ref 5–8)
PROT UR STRIP-MCNC: ABNORMAL MG/DL
RBC # UR STRIP: ABNORMAL /UL
SP GR UR: 1.01 (ref 1–1.03)
UROBILINOGEN UR QL: ABNORMAL

## 2025-07-21 NOTE — PATIENT INSTRUCTIONS
Increase intake of clear liquids.  Try ice/heat to lower back, whichever feels better.  Continue to work on healthy diet and exercise.  Follow up pending lab results.  Follow up in 4 months, or sooner if symptoms persist or worsen.       Medicare Wellness  Personal Prevention Plan of Service     Date of Office Visit:    Encounter Provider:  YONG Jefferson  Place of Service:  [facility]  Patient Name: Ania Reyes  :  1980    As part of the Medicare Wellness portion of your visit today, we are providing you with this personalized preventive plan of services (PPPS). This plan is based upon recommendations of the [place] Preventive Services Task Force (USPSTF) and the Advisory Committee on Immunization Practices (ACIP).    This lists the preventive care services that should be considered, and provides dates of when you are due. Items listed as completed are up-to-date and do not require any further intervention.    Health Maintenance   Topic Date Due    MAMMOGRAM  2025    ANNUAL WELLNESS VISIT  07/15/2025    COVID-19 Vaccine (2024- season) 2026 (Originally 2024)    INFLUENZA VACCINE  10/01/2025    PAP SMEAR  2027    TDAP/TD VACCINES (3 - Td or Tdap) 2030    HEPATITIS C SCREENING  Completed    Pneumococcal Vaccine 0-49  Aged Out       Orders Placed This Encounter   Procedures    Urine Culture - Urine, Urine, Clean Catch     Release to patient:   Routine Release [[id number]]    Mammo Screening Digital Tomosynthesis Bilateral With CAD     Standing Status:   Future     Expected Date:   2025     Expiration Date:   2026     Reason for Exam::   screening mammo     Patient Pregnant:   No     Does this patient have a diabetic monitoring/medication delivering device on?:   No     Release to patient:   Routine Release [[id number]]    Comprehensive Metabolic Panel     Release to patient:   Routine Release [[id number]]    Lipid Panel  With LDL / HDL Ratio     Release to patient:   Routine Release [[id number]]    TSH Rfx On Abnormal To Free T4     Release to patient:   Routine Release [[id number]]    POCT urinalysis dipstick, automated     Release to patient:   Routine Release [[id number]]    CBC & Differential     Manual Differential:   No     Release to patient:   Routine Release [[id number]]       Return in about 4 months (around 11/21/2025) for Recheck.

## 2025-07-21 NOTE — PROGRESS NOTES
Subjective   The ABCs of the Annual Wellness Visit  Medicare Wellness Visit      Ania Reyes is a 44 y.o. patient who presents for a Medicare Wellness Visit.    The following portions of the patient's history were reviewed and   updated as appropriate: allergies, current medications, past family history, past medical history, past social history, past surgical history, and problem list.    Compared to one year ago, the patient's physical   health is the same.  Compared to one year ago, the patient's mental   health is the same.    Recent Hospitalizations:  She was not admitted to the hospital during the last year.     Current Medical Providers:  Patient Care Team:  Cinthia Mark APRN as PCP - General (Family Medicine)  Betty Mccurdy MD as Consulting Physician (Cardiology)  Amanda Escobar APRN (Nurse Practitioner)  Todd Davis MD as Consulting Physician (Urology)  Kellie Peterson APRN as Nurse Practitioner (Nurse Practitioner)  Renetta Ortega MD as Consulting Physician (Neurology)  Markie Trammell MD as Consulting Physician (Physical Medicine and Rehabilitation)  Kalyn Martin APRN as Nurse Practitioner (Behavioral Health)  Rand Candelario APRN as Nurse Practitioner (Obstetrics and Gynecology)    Outpatient Medications Prior to Visit   Medication Sig Dispense Refill    aspirin 81 MG tablet Take 1 tablet by mouth Daily.      atorvastatin (LIPITOR) 20 MG tablet Take 1 tablet by mouth Daily.      baclofen (LIORESAL) 10 MG tablet TAKE 1 TABLET BY MOUTH TWICE DAILY AS NEEDED FOR MUSCLE SPASMS 180 tablet 0    Cyanocobalamin (Vitamin B-12) 5000 MCG tablet dispersible Place 1 tablet on the tongue Every 30 (Thirty) Days.      diazePAM (DIASTAT ACUDIAL) 20 MG rectal kit INSERT 12.5MG INTO THE RECTUM ONCE FOR 1 DOSE FOR GENERALIZED SEIZURE LASTING OVER 3 MINUTES.      diclofenac (VOLTAREN) 50 MG EC tablet TAKE 1 TABLET BY MOUTH THREE TIMES DAILY AS NEEDED FOR HAND PAIN (TAKE WITH FOOD) 270 tablet 0     DULoxetine (CYMBALTA) 30 MG capsule Take 1 capsule by mouth Every Night. in addition to 60 mg in morning 90 capsule 1    DULoxetine (CYMBALTA) 60 MG capsule Take 1 capsule by mouth Daily. In addition to 30 mg nightly 90 capsule 1    famotidine (PEPCID) 20 MG tablet Take 1 tablet by mouth Daily.      gabapentin (NEURONTIN) 600 MG tablet Take 1 tablet by mouth 3 (Three) Times a Day.  5    levETIRAcetam (KEPPRA) 500 MG tablet Take 1 tablet by mouth 2 (Two) Times a Day.      metoprolol succinate XL (TOPROL-XL) 25 MG 24 hr tablet Take 1 tablet by mouth Daily.  3    Multiple Vitamins-Minerals (MULTIVITAMIN ADULTS PO) Take 1 tablet by mouth Daily.      nitrofurantoin, macrocrystal-monohydrate, (Macrobid) 100 MG capsule Take 1 capsule by mouth 2 (Two) Times a Day. 20 capsule 0    potassium chloride (K-DUR) 10 MEQ CR tablet Take 1 tablet by mouth Daily.      Vitamin D, Cholecalciferol, 25 MCG (1000 UT) capsule Take 2 capsules by mouth Daily.       No facility-administered medications prior to visit.     No opioid medication identified on active medication list. I have reviewed chart for other potential  high risk medication/s and harmful drug interactions in the elderly.      Aspirin is on active medication list. Aspirin use is indicated based on review of current medical condition/s. Pros and cons of this therapy have been discussed today. Benefits of this medication outweigh potential harm.  Patient has been encouraged to continue taking this medication.  .      Patient Active Problem List   Diagnosis    Spastic hemiplegia affecting dominant side    Pain in right hand    Muscle hypertonicity    History of stroke    History of repair of mitral valve    High risk medication use    Dyslipidemia    Allergic rhinitis    Right hemiplegia    Osteopenia    Vitamin D deficiency    History of parathyroid surgery    Abnormality of gait following cerebrovascular accident (CVA)    Dizziness    Heart failure    Hypercalcemia     "Methicillin resistant Staphylococcus aureus infection    Seizure    Anxiety disorder    Moderate episode of recurrent major depressive disorder    Pain disorder associated with psychological factors    Cold intolerance    Vitamin B12 deficiency    Adnexal tenderness, left    Uterine fibroid    Chronic pain    Recurrent major depressive disorder, in full remission    Uterine leiomyoma    Diarrhea    Excessive sweating    Acute cystitis with hematuria    Leukocytosis    Elevated liver enzymes     Advance Care Planning Advance Directive is not on file.  ACP discussion was held with the patient during this visit. Patient does not have an advance directive, information provided.        Objective   Vitals:    07/21/25 1440   BP: 110/74   BP Location: Left arm   Patient Position: Sitting   Cuff Size: Adult   Pulse: 90   Temp: 98.2 °F (36.8 °C)   TempSrc: Temporal   SpO2: 97%   Weight: 73.8 kg (162 lb 12.8 oz)   Height: 175.3 cm (69.02\")   PainSc: 0-No pain       Estimated body mass index is 24.03 kg/m² as calculated from the following:    Height as of this encounter: 175.3 cm (69.02\").    Weight as of this encounter: 73.8 kg (162 lb 12.8 oz).    BMI is within normal parameters. No other follow-up for BMI required.           Does the patient have evidence of cognitive impairment? No                                                                                               Health  Risk Assessment    Smoking Status:  Social History     Tobacco Use   Smoking Status Never    Passive exposure: Never   Smokeless Tobacco Never     Alcohol Consumption:  Social History     Substance and Sexual Activity   Alcohol Use Yes    Alcohol/week: 1.0 standard drink of alcohol    Types: 1 Drinks containing 0.5 oz of alcohol per week    Comment: social drinker       Fall Risk Screen  STEADI Fall Risk Assessment was completed, and patient is at HIGH risk for falls. Assessment completed on:7/21/2025    Depression Screening   Little interest or " pleasure in doing things? Not at all   Feeling down, depressed, or hopeless? Not at all   PHQ-2 Total Score 0      Health Habits and Functional and Cognitive Screenin/14/2025     8:24 AM   Functional & Cognitive Status   Do you have difficulty preparing food and eating? No   Do you have difficulty bathing yourself, getting dressed or grooming yourself? No   Do you have difficulty using the toilet? No   Do you have difficulty moving around from place to place? No   Do you have trouble with steps or getting out of a bed or a chair? No   Current Diet Limited Junk Food   Dental Exam Up to date   Eye Exam Up to date   Exercise (times per week) 0 times per week   Current Exercises Include Home Exercise Program (TV, Computer, Etc.);Other;Yard Work   Do you need help using the phone?  No   Are you deaf or do you have serious difficulty hearing?  No   Do you need help to go to places out of walking distance? No   Do you need help shopping? No   Do you need help preparing meals?  No   Do you need help with housework?  No   Do you need help with laundry? No   Do you need help taking your medications? No   Do you need help managing money? No   Do you ever drive or ride in a car without wearing a seat belt? No   Have you felt unusual fatigue (could be tiredness), stress, anger or loneliness in the last month? Yes   Who do you live with? Other   If you need help, do you have trouble finding someone available to you? No   Have you been bothered in the last four weeks by sexual problems? No   Do you have difficulty concentrating, remembering or making decisions? No           Age-appropriate Screening Schedule:  Refer to the list below for future screening recommendations based on patient's age, sex and/or medical conditions. Orders for these recommended tests are listed in the plan section. The patient has been provided with a written plan.    Health Maintenance List  Health Maintenance   Topic Date Due    MAMMOGRAM   04/08/2025    COVID-19 Vaccine (4 - 2024-25 season) 01/21/2026 (Originally 9/1/2024)    INFLUENZA VACCINE  10/01/2025    ANNUAL WELLNESS VISIT  07/21/2026    PAP SMEAR  03/11/2027    TDAP/TD VACCINES (3 - Td or Tdap) 12/07/2030    HEPATITIS C SCREENING  Completed    Pneumococcal Vaccine 0-49  Aged Out     Has had some fatigue recently.  Will order mammo.                                                                                                                                             CMS Preventative Services Quick Reference  Risk Factors Identified During Encounter  Fall Risk-High or Moderate: Discussed Fall Prevention in the home  Immunizations Discussed/Encouraged: COVID19    The above risks/problems have been discussed with the patient.    Discussed high risk for falls and recommended avoiding throw rugs, installing grab bars in bathroom, making sure have handrails on steps, etc.    Pertinent information has been shared with the patient in the After Visit Summary.  An After Visit Summary and PPPS were made available to the patient.    Follow Up:   Next Medicare Wellness visit to be scheduled in 1 year.         Additional E&M Note during same encounter follows:  Patient has additional, significant, and separately identifiable condition(s)/problem(s) that require work above and beyond the Medicare Wellness Visit     Chief Complaint  Medicare Wellness-subsequent (She is still having burning with urination)    Subjective   HPI  Ania is also being seen today for additional medical problem/s.  Patient presents with c/o burning with urination; was seen for UTI on 6/26/25 and given Rx for Nitrofurantoin, then symptoms worse next day and labs noted elevated liver enzymes so sent to ER; pt was seen at [facility] ER on 6/27/25; had CT abd/pelvis and noted nonobstructing kidney stones; diagnosed with UTI and was given IV antibiotic (Rocephin); discharged home with Rx for Keflex.    Finished Keflex and  symptoms improved, but did not resolve; has had pain with urination and urinary frequency; no recent fever; no nausea or vomiting; some discomfort in left lower back; no radiation of pain down legs.    F/U elevated liver enzymes: liver enzymes noted elevation when seen for UTI; social EtOH; no recent EtOH or Tylenol; needs repeat labs to recheck liver enzymes today.    Also, c/o abdominal distension; has upcoming appointment with GI next month; has had diarrhea right after eating; does not seem to matter what eats.    F/U S/P MVR: takes Metoprolol daily; does not monitor BP; no headaches; mild orthostasis at times; no swelling; sees Dr. Mccurdy cardiology annually; had follow up in December and no changes.     F/U dyslipidemia: takes Atorvastatin daily; no myalgias; watches saturated fats in diet; non-fasting today.     F/U right hand pain/muscle hypertonicity: takes Baclofen twice daily and Diclofenac three times daily and help;  takes Baclofen in early morning and again around 1000 to prevent hand drawing up around 0930 and helps; also takes Gabapentin TID and helps; sees pain management; gets Botox injections in right hand and foot per Dr. Trammell at [facility] and helps foot more than right hand; Botox has been helping longer, gets relief for about 2 months; has not been wearing brace to stretch hand as recommended per PT like should, but does some stretches of right hand; wears brace at night when sleeps; has been trying to do more things with right hand; able to push mow grass.     F/U seizures/spastic hemiplegia/history of stroke: takes Keppra twice daily; sees neurology, had follow up in February and no changes; goes to Epilepsy clinic; no seizures since has been on medication.     F/U depression/anxiety: takes Duloxetine twice daily; takes 60 mg at 10:00 a.m. and 30 mg at bedtime and works well; no longer seeing psychiatry or counselor; things have been good since has been living with mother; no SI/HI.    "  Saw GYN for uterine fibroid; discussed Mirena IUD for heavy menses; has not had IUD placed yet; will consider hysterectomy if no improvement with IUD; has not had IUD placed due to timing with menses; LMP 1 week ago.    Needs updated paper work for disability     Mother was present during the history-taking and subsequent discussion with this patient.  Patient agrees to the presence of the individual during this visit.      Review of Systems   Constitutional:  Positive for diaphoresis and fatigue. Negative for appetite change, chills and fever.   HENT:  Negative for ear pain, sinus pressure, sore throat and trouble swallowing.    Eyes:  Negative for discharge and visual disturbance.   Respiratory:  Negative for cough, chest tightness and shortness of breath.    Cardiovascular:  Negative for chest pain and palpitations.   Gastrointestinal:  Positive for abdominal distention and diarrhea (see HPI). Negative for blood in stool and constipation. Abdominal pain: some at times.  Endocrine: Negative for cold intolerance and polydipsia.   Genitourinary:  Positive for dysuria and frequency. Negative for hematuria.   Musculoskeletal:  Back pain: see HPI.   Skin:  Negative for rash.   Neurological:  Negative for syncope and weakness.   Hematological:  Does not bruise/bleed easily.            Objective   Vital Signs:  /74 (BP Location: Left arm, Patient Position: Sitting, Cuff Size: Adult)   Pulse 90   Temp 98.2 °F (36.8 °C) (Temporal)   Ht 175.3 cm (69.02\")   Wt 73.8 kg (162 lb 12.8 oz)   SpO2 97%   BMI 24.03 kg/m²     Physical Exam  Vitals and nursing note reviewed.   Constitutional:       General: She is not in acute distress.     Appearance: She is well-developed and well-groomed. She is not diaphoretic.   HENT:      Head: Normocephalic and atraumatic.      Jaw: No tenderness or pain on movement.      Right Ear: Tympanic membrane and external ear normal. No decreased hearing noted.      Left Ear: Tympanic " membrane and external ear normal. No decreased hearing noted.      Nose: Nose normal.      Right Sinus: No maxillary sinus tenderness or frontal sinus tenderness.      Left Sinus: No maxillary sinus tenderness or frontal sinus tenderness.      Mouth/Throat:      Mouth: Mucous membranes are moist.      Pharynx: No oropharyngeal exudate or posterior oropharyngeal erythema.   Eyes:      Extraocular Movements: Extraocular movements intact.      Conjunctiva/sclera: Conjunctivae normal.      Pupils: Pupils are equal, round, and reactive to light.   Neck:      Thyroid: No thyromegaly.      Vascular: No carotid bruit.      Trachea: No tracheal deviation.   Cardiovascular:      Rate and Rhythm: Normal rate and regular rhythm.      Pulses: Normal pulses.      Heart sounds: Normal heart sounds.   Pulmonary:      Effort: Pulmonary effort is normal. No respiratory distress.      Breath sounds: Normal breath sounds.   Abdominal:      General: Bowel sounds are normal.      Palpations: Abdomen is soft. There is no hepatomegaly or splenomegaly.      Tenderness: There is no abdominal tenderness. There is left CVA tenderness. There is no guarding or rebound.   Musculoskeletal:         General: Normal range of motion.      Cervical back: Normal range of motion and neck supple. No bony tenderness.      Thoracic back: No bony tenderness.      Lumbar back: No bony tenderness.        Back:       Right lower leg: No edema.      Left lower leg: No edema.   Lymphadenopathy:      Cervical: No cervical adenopathy.   Skin:     General: Skin is warm and dry.      Findings: No rash.   Neurological:      Mental Status: She is alert and oriented to person, place, and time.      Cranial Nerves: No cranial nerve deficit.      Motor: Weakness (no change in mild weakness of RUE compared to LUE) present.      Coordination: Coordination normal.      Gait: Abnormal gait: guarded gait; mild limping on right.      Deep Tendon Reflexes: Reflexes are normal  and symmetric.      Comments: decreased ROM of right hand due to hypertonicity   Psychiatric:         Mood and Affect: Mood normal.         Behavior: Behavior normal.         Thought Content: Thought content normal.         Cognition and Memory: Cognition normal.         Judgment: Judgment normal.         Lab Results   Component Value Date    WBC 7.0 07/21/2025    RBC 4.02 07/21/2025    HGB 11.9 07/21/2025    HCT 38.5 07/21/2025    MCV 96 07/21/2025    MCH 29.6 07/21/2025    MCHC 30.9 (L) 07/21/2025    RDW 13.0 07/21/2025    RDWSD 43.4 06/27/2025    MPV 10.2 06/27/2025     07/21/2025    NEUTRORELPCT 71 07/21/2025    LYMPHORELPCT 20 07/21/2025    MONORELPCT 5 07/21/2025    EOSRELPCT 3 07/21/2025    BASORELPCT 1 07/21/2025    AUTOIGPER 0.5 06/27/2025    NEUTROABS 4.9 07/21/2025    LYMPHSABS 1.4 07/21/2025    MONOSABS 0.4 07/21/2025    EOSABS 0.2 07/21/2025    BASOSABS 0.1 07/21/2025    AUTOIGNUM 0.06 (H) 06/27/2025    NRBC 0.0 06/27/2025     Lab Results   Component Value Date    GLUCOSE 79 07/21/2025    BUN 14 07/21/2025    CREATININE 1.09 (H) 07/21/2025    EGFRIFNONA 106 11/05/2021    EGFRIFAFRI 122 11/05/2021    BCR 13 07/21/2025    K 4.0 07/21/2025    CO2 20 07/21/2025    CALCIUM 9.1 07/21/2025    ALBUMIN 4.1 07/21/2025    AST 16 07/21/2025    ALT 22 07/21/2025      Lab Results   Component Value Date    CHLPL 226 (H) 07/21/2025    TRIG 439 (H) 07/21/2025    HDL 31 (L) 07/21/2025    VLDL 77 (H) 07/21/2025     (H) 07/21/2025     Lab Results   Component Value Date    TSH 3.090 07/21/2025     Lab Results   Component Value Date    RBCUA 6-10 (A) 06/27/2025    BACTERIA Trace (A) 06/27/2025    COLORU Dark Yellow 07/21/2025    CLARITYU Turbid (A) 07/21/2025    LEUKOCYTESUR Moderate (2+) (A) 07/21/2025    GLUCOSEU Negative 06/27/2025    BLOODU Moderate (2+) (A) 06/27/2025    BILIRUBINUR Negative 07/21/2025    NITRITEU Negative 06/27/2025      Records reviewed from [facility] ER on 6/27/25.  presents to the emergency department for evaluation of abdominal and left flank pain ongoing for 6 days.  Patient reports she was diagnosed with UTI yesterday at primary care provider office and also was told she had high liver enzymes on lab work yesterday. Patient reports she believes she had a fever at home prior to arrival. Reports chills and diaphoresis. One episode of vomiting. CBC independently reviewed and interpreted by me and shows no critical abnormalities.  CMP independently reviewed and interpreted by me and shows no critical abnormalities except mild transaminitis.  CT scan of the pelvis reviewed by me does reveal bilateral nephrolithiasis without ureterolithiasis, specifically no obstructive uropathy.  Hepatitis panel pending.  Very strict return to ER and follow-up instructions have been provided to the patient. Given the patient has flank pain now her antibiotic was changed from Macrobid to cephalexin.     6/27/25 CT abd/pelvis: 1.There is a 10 mm calculus within the right renal pelvis without evidence of obstruction. There is right renal pelvis urothelial thickening which may be related to chronic irritation/inflammation secondary to the calculus although ascending urinary tract infection not excluded. Correlate with symptoms and urinalysis. 2.Nonobstructive 4 mm left renal calculus.     Assessment and Plan      History of UTI    Orders:    POCT urinalysis dipstick, automated    Painful urination    Orders:    POCT urinalysis dipstick, automated    Encounter for Medicare annual wellness exam    Orders:    CBC & Differential    Comprehensive Metabolic Panel    Urine Culture - Urine, Urine, Clean Catch    Mammo Screening Digital Tomosynthesis Bilateral With CAD; Future    Lipid Panel With LDL / HDL Ratio    TSH Rfx On Abnormal To Free T4    Dyslipidemia  Continue Atorvastatin daily pending lab results.  Continue to work on healthy diet and exercise.    Orders:    Comprehensive Metabolic Panel     Lipid Panel With LDL / HDL Ratio    Elevated liver enzymes    Orders:    Comprehensive Metabolic Panel    Leukocytosis, unspecified type    Orders:    CBC & Differential    Acute cystitis with hematuria  Increase intake of clear liquids.  Try ice/heat to lower back, whichever feels better.    Orders:    Urine Culture - Urine, Urine, Clean Catch    Screening mammogram for breast cancer    Orders:    Mammo Screening Digital Tomosynthesis Bilateral With CAD; Future    Excessive sweating    Orders:    CBC & Differential    Comprehensive Metabolic Panel    TSH Rfx On Abnormal To Free T4    Diarrhea, unspecified type  Increase intake of clear liquids.  Follow up as scheduled with GI.         History of repair of mitral valve  Stable.  Continue Metoprolol daily.  Follow up as scheduled with cardiology.         Anxiety disorder, unspecified type  Psychological condition is stable.  Continue current treatment regimen.  Psychological condition  will be reassessed 4 months.  Continue Duloxetine twice daily.         Moderate episode of recurrent major depressive disorder  Patient's depression is a recurrent episode that is moderate without psychosis. Depression is active and stable.    Plan:   Continue current medication therapy   Continue Duloxetine twice daily.    Followup 4 months.          Muscle hypertonicity  Stable.  Continue Baclofen twice daily and Diclofenac TID.         Pain in right hand  Stable.  Continue Diclofenac TID.  Continue Gabapentin TID.  Follow up as scheduled with pain management.         Spastic hemiplegia affecting dominant side  Continue stretching as per PT.  Continue Baclofen twice daily.         Seizure  Stable.  Continue Keppra twice daily.  Follow up as scheduled with neurology.                 Follow Up   Return in about 4 months (around 11/21/2025) for Recheck.or sooner if symptoms persist or worsen.  Paper work completed for long term disability as requested.  Will consider stopping  Atorvastatin if liver enzymes still elevated on repeat labs.      Patient was given instructions and counseling regarding her condition or for health maintenance advice. Please see specific information pulled into the AVS if appropriate.

## 2025-07-21 NOTE — ASSESSMENT & PLAN NOTE
Continue Atorvastatin daily pending lab results.  Continue to work on healthy diet and exercise.    Orders:    Comprehensive Metabolic Panel    Lipid Panel With LDL / HDL Ratio

## 2025-07-23 PROBLEM — R61 EXCESSIVE SWEATING: Status: ACTIVE | Noted: 2025-07-23

## 2025-07-23 PROBLEM — N30.01 ACUTE CYSTITIS WITH HEMATURIA: Status: ACTIVE | Noted: 2025-07-23

## 2025-07-23 PROBLEM — D72.829 LEUKOCYTOSIS: Status: ACTIVE | Noted: 2025-07-23

## 2025-07-23 PROBLEM — R74.8 ELEVATED LIVER ENZYMES: Status: ACTIVE | Noted: 2025-07-23

## 2025-07-23 NOTE — ASSESSMENT & PLAN NOTE
Increase intake of clear liquids.  Try ice/heat to lower back, whichever feels better.    Orders:    Urine Culture - Urine, Urine, Clean Catch

## 2025-07-23 NOTE — ASSESSMENT & PLAN NOTE
Orders:    CBC & Differential    Comprehensive Metabolic Panel    TSH Rfx On Abnormal To Free T4

## 2025-07-24 DIAGNOSIS — N30.01 ACUTE CYSTITIS WITH HEMATURIA: Primary | ICD-10-CM

## 2025-07-24 LAB
ALBUMIN SERPL-MCNC: 4.1 G/DL (ref 3.9–4.9)
ALP SERPL-CCNC: 185 IU/L (ref 44–121)
ALT SERPL-CCNC: 22 IU/L (ref 0–32)
AST SERPL-CCNC: 16 IU/L (ref 0–40)
BACTERIA UR CULT: ABNORMAL
BACTERIA UR CULT: ABNORMAL
BASOPHILS # BLD AUTO: 0.1 X10E3/UL (ref 0–0.2)
BASOPHILS NFR BLD AUTO: 1 %
BILIRUB SERPL-MCNC: <0.2 MG/DL (ref 0–1.2)
BUN SERPL-MCNC: 14 MG/DL (ref 6–24)
BUN/CREAT SERPL: 13 (ref 9–23)
CALCIUM SERPL-MCNC: 9.1 MG/DL (ref 8.7–10.2)
CHLORIDE SERPL-SCNC: 105 MMOL/L (ref 96–106)
CHOLEST SERPL-MCNC: 226 MG/DL (ref 100–199)
CO2 SERPL-SCNC: 20 MMOL/L (ref 20–29)
CREAT SERPL-MCNC: 1.09 MG/DL (ref 0.57–1)
EGFRCR SERPLBLD CKD-EPI 2021: 65 ML/MIN/1.73
EOSINOPHIL # BLD AUTO: 0.2 X10E3/UL (ref 0–0.4)
EOSINOPHIL NFR BLD AUTO: 3 %
ERYTHROCYTE [DISTWIDTH] IN BLOOD BY AUTOMATED COUNT: 13 % (ref 11.7–15.4)
GLOBULIN SER CALC-MCNC: 2.7 G/DL (ref 1.5–4.5)
GLUCOSE SERPL-MCNC: 79 MG/DL (ref 70–99)
HCT VFR BLD AUTO: 38.5 % (ref 34–46.6)
HDLC SERPL-MCNC: 31 MG/DL
HGB BLD-MCNC: 11.9 G/DL (ref 11.1–15.9)
IMM GRANULOCYTES # BLD AUTO: 0 X10E3/UL (ref 0–0.1)
IMM GRANULOCYTES NFR BLD AUTO: 0 %
LDLC SERPL CALC-MCNC: 118 MG/DL (ref 0–99)
LDLC/HDLC SERPL: 3.8 RATIO (ref 0–3.2)
LYMPHOCYTES # BLD AUTO: 1.4 X10E3/UL (ref 0.7–3.1)
LYMPHOCYTES NFR BLD AUTO: 20 %
MCH RBC QN AUTO: 29.6 PG (ref 26.6–33)
MCHC RBC AUTO-ENTMCNC: 30.9 G/DL (ref 31.5–35.7)
MCV RBC AUTO: 96 FL (ref 79–97)
MONOCYTES # BLD AUTO: 0.4 X10E3/UL (ref 0.1–0.9)
MONOCYTES NFR BLD AUTO: 5 %
NEUTROPHILS # BLD AUTO: 4.9 X10E3/UL (ref 1.4–7)
NEUTROPHILS NFR BLD AUTO: 71 %
OTHER ANTIBIOTIC SUSC ISLT: ABNORMAL
PLATELET # BLD AUTO: 281 X10E3/UL (ref 150–450)
POTASSIUM SERPL-SCNC: 4 MMOL/L (ref 3.5–5.2)
PROT SERPL-MCNC: 6.8 G/DL (ref 6–8.5)
RBC # BLD AUTO: 4.02 X10E6/UL (ref 3.77–5.28)
SODIUM SERPL-SCNC: 141 MMOL/L (ref 134–144)
TRIGL SERPL-MCNC: 439 MG/DL (ref 0–149)
TSH SERPL DL<=0.005 MIU/L-ACNC: 3.09 UIU/ML (ref 0.45–4.5)
VLDLC SERPL CALC-MCNC: 77 MG/DL (ref 5–40)
WBC # BLD AUTO: 7 X10E3/UL (ref 3.4–10.8)

## 2025-07-24 RX ORDER — CEPHALEXIN 500 MG/1
500 CAPSULE ORAL 3 TIMES DAILY
Qty: 21 CAPSULE | Refills: 0 | Status: SHIPPED | OUTPATIENT
Start: 2025-07-24 | End: 2025-07-31

## 2025-07-24 NOTE — ASSESSMENT & PLAN NOTE
Psychological condition is stable.  Continue current treatment regimen.  Psychological condition  will be reassessed 4 months.  Continue Duloxetine twice daily.

## 2025-07-24 NOTE — ASSESSMENT & PLAN NOTE
Stable.  Continue Diclofenac TID.  Continue Gabapentin TID.  Follow up as scheduled with pain management.

## 2025-07-24 NOTE — ASSESSMENT & PLAN NOTE
Patient's depression is a recurrent episode that is moderate without psychosis. Depression is active and stable.    Plan:   Continue current medication therapy   Continue Duloxetine twice daily.    Followup 4 months.

## 2025-08-22 DIAGNOSIS — G81.10 SPASTIC HEMIPLEGIA AFFECTING DOMINANT SIDE: ICD-10-CM

## 2025-08-22 DIAGNOSIS — M62.89 MUSCLE HYPERTONICITY: ICD-10-CM

## 2025-08-22 DIAGNOSIS — M79.641 PAIN IN RIGHT HAND: ICD-10-CM

## 2025-08-25 ENCOUNTER — HOSPITAL ENCOUNTER (OUTPATIENT)
Dept: MAMMOGRAPHY | Facility: HOSPITAL | Age: 45
Discharge: HOME | End: 2025-08-25
Admitting: NURSE PRACTITIONER
Payer: MEDICARE

## 2025-08-25 DIAGNOSIS — Z00.00 ENCOUNTER FOR MEDICARE ANNUAL WELLNESS EXAM: ICD-10-CM

## 2025-08-25 DIAGNOSIS — Z12.31 SCREENING MAMMOGRAM FOR BREAST CANCER: ICD-10-CM

## 2025-08-25 PROCEDURE — 77063 BREAST TOMOSYNTHESIS BI: CPT

## 2025-08-25 PROCEDURE — 77067 SCR MAMMO BI INCL CAD: CPT

## 2025-08-28 ENCOUNTER — OFFICE VISIT (OUTPATIENT)
Dept: GASTROENTEROLOGY | Facility: CLINIC | Age: 45
End: 2025-08-28
Payer: MEDICARE

## 2025-08-28 VITALS
WEIGHT: 164 LBS | OXYGEN SATURATION: 100 % | BODY MASS INDEX: 24.29 KG/M2 | DIASTOLIC BLOOD PRESSURE: 70 MMHG | HEIGHT: 69 IN | HEART RATE: 87 BPM | SYSTOLIC BLOOD PRESSURE: 107 MMHG

## 2025-08-28 DIAGNOSIS — Z80.0 FAMILY HISTORY OF COLON CANCER: ICD-10-CM

## 2025-08-28 DIAGNOSIS — K52.9 POSTPRANDIAL DIARRHEA: Primary | ICD-10-CM

## 2025-08-28 DIAGNOSIS — R74.8 ELEVATED LIVER ENZYMES: ICD-10-CM

## 2025-08-28 RX ORDER — DICYCLOMINE HCL 20 MG
20 TABLET ORAL EVERY 6 HOURS PRN
Qty: 90 TABLET | Refills: 1 | Status: SHIPPED | OUTPATIENT
Start: 2025-08-28

## 2025-08-28 RX ORDER — POLYETHYLENE GLYCOL 3350, SODIUM SULFATE, SODIUM CHLORIDE, POTASSIUM CHLORIDE, ASCORBIC ACID, SODIUM ASCORBATE 140-9-5.2G
1 KIT ORAL TAKE AS DIRECTED
Qty: 1 EACH | Refills: 0 | Status: SHIPPED | OUTPATIENT
Start: 2025-08-28 | End: 2025-08-29

## 2025-08-29 ENCOUNTER — PATIENT ROUNDING (BHMG ONLY) (OUTPATIENT)
Dept: GASTROENTEROLOGY | Facility: CLINIC | Age: 45
End: 2025-08-29
Payer: MEDICARE